# Patient Record
Sex: MALE | Race: WHITE | Employment: FULL TIME | URBAN - METROPOLITAN AREA
[De-identification: names, ages, dates, MRNs, and addresses within clinical notes are randomized per-mention and may not be internally consistent; named-entity substitution may affect disease eponyms.]

---

## 2021-01-01 ENCOUNTER — HOSPITAL ENCOUNTER (EMERGENCY)
Age: 66
Discharge: HOME | End: 2021-10-10
Payer: COMMERCIAL

## 2021-01-01 ENCOUNTER — APPOINTMENT (OUTPATIENT)
Dept: GENERAL RADIOLOGY | Age: 66
DRG: 207 | End: 2021-01-01
Payer: COMMERCIAL

## 2021-01-01 ENCOUNTER — OFFICE VISIT (OUTPATIENT)
Dept: FAMILY MEDICINE CLINIC | Age: 66
End: 2021-01-01
Payer: COMMERCIAL

## 2021-01-01 ENCOUNTER — APPOINTMENT (OUTPATIENT)
Dept: CT IMAGING | Age: 66
DRG: 207 | End: 2021-01-01
Payer: COMMERCIAL

## 2021-01-01 ENCOUNTER — CARE COORDINATION (OUTPATIENT)
Dept: CARE COORDINATION | Age: 66
End: 2021-01-01

## 2021-01-01 ENCOUNTER — APPOINTMENT (OUTPATIENT)
Dept: INTERVENTIONAL RADIOLOGY/VASCULAR | Age: 66
DRG: 207 | End: 2021-01-01
Payer: COMMERCIAL

## 2021-01-01 ENCOUNTER — HOSPITAL ENCOUNTER (INPATIENT)
Age: 66
LOS: 13 days | DRG: 207 | End: 2021-10-27
Attending: EMERGENCY MEDICINE
Payer: COMMERCIAL

## 2021-01-01 ENCOUNTER — APPOINTMENT (OUTPATIENT)
Dept: ULTRASOUND IMAGING | Age: 66
DRG: 207 | End: 2021-01-01
Payer: COMMERCIAL

## 2021-01-01 ENCOUNTER — APPOINTMENT (OUTPATIENT)
Dept: GENERAL RADIOLOGY | Age: 66
End: 2021-01-01
Payer: COMMERCIAL

## 2021-01-01 VITALS
BODY MASS INDEX: 33.5 KG/M2 | HEART RATE: 91 BPM | TEMPERATURE: 96.9 F | DIASTOLIC BLOOD PRESSURE: 80 MMHG | WEIGHT: 252.8 LBS | SYSTOLIC BLOOD PRESSURE: 126 MMHG | OXYGEN SATURATION: 94 % | HEIGHT: 73 IN

## 2021-01-01 VITALS
RESPIRATION RATE: 18 BRPM | TEMPERATURE: 99.3 F | HEIGHT: 75 IN | SYSTOLIC BLOOD PRESSURE: 145 MMHG | DIASTOLIC BLOOD PRESSURE: 77 MMHG | HEART RATE: 82 BPM | BODY MASS INDEX: 32.33 KG/M2 | OXYGEN SATURATION: 97 % | WEIGHT: 260 LBS

## 2021-01-01 VITALS
HEIGHT: 75 IN | OXYGEN SATURATION: 92 % | BODY MASS INDEX: 28.72 KG/M2 | WEIGHT: 231 LBS | TEMPERATURE: 97.7 F | SYSTOLIC BLOOD PRESSURE: 42 MMHG | DIASTOLIC BLOOD PRESSURE: 30 MMHG

## 2021-01-01 DIAGNOSIS — U07.1 COVID-19: Primary | ICD-10-CM

## 2021-01-01 DIAGNOSIS — J12.82 PNEUMONIA DUE TO COVID-19 VIRUS: Primary | ICD-10-CM

## 2021-01-01 DIAGNOSIS — M10.9 GOUT OF RIGHT FOOT, UNSPECIFIED CAUSE, UNSPECIFIED CHRONICITY: Primary | ICD-10-CM

## 2021-01-01 DIAGNOSIS — U07.1 PNEUMONIA DUE TO COVID-19 VIRUS: Primary | ICD-10-CM

## 2021-01-01 DIAGNOSIS — J18.9 MULTIFOCAL PNEUMONIA: ICD-10-CM

## 2021-01-01 DIAGNOSIS — J96.01 ACUTE RESPIRATORY FAILURE WITH HYPOXEMIA (HCC): ICD-10-CM

## 2021-01-01 LAB
ALBUMIN SERPL-MCNC: 2.9 GM/DL (ref 3.4–5)
ALBUMIN SERPL-MCNC: 2.9 GM/DL (ref 3.4–5)
ALBUMIN SERPL-MCNC: 3 GM/DL (ref 3.4–5)
ALBUMIN SERPL-MCNC: 3 GM/DL (ref 3.4–5)
ALBUMIN SERPL-MCNC: 3.1 GM/DL (ref 3.4–5)
ALBUMIN SERPL-MCNC: 3.2 GM/DL (ref 3.4–5)
ALBUMIN SERPL-MCNC: 3.4 GM/DL (ref 3.4–5)
ALBUMIN SERPL-MCNC: 3.7 GM/DL (ref 3.4–5)
ALBUMIN SERPL-MCNC: 3.8 GM/DL (ref 3.4–5)
ALBUMIN SERPL-MCNC: 3.9 GM/DL (ref 3.4–5)
ALBUMIN SERPL-MCNC: 4 GM/DL (ref 3.4–5)
ALBUMIN SERPL-MCNC: 4.2 GM/DL (ref 3.4–5)
ALP BLD-CCNC: 103 IU/L (ref 40–129)
ALP BLD-CCNC: 105 IU/L (ref 40–129)
ALP BLD-CCNC: 50 IU/L (ref 40–129)
ALP BLD-CCNC: 58 IU/L (ref 40–129)
ALP BLD-CCNC: 61 IU/L (ref 40–129)
ALP BLD-CCNC: 64 IU/L (ref 40–129)
ALP BLD-CCNC: 65 IU/L (ref 40–129)
ALP BLD-CCNC: 67 IU/L (ref 40–128)
ALP BLD-CCNC: 67 IU/L (ref 40–129)
ALP BLD-CCNC: 76 IU/L (ref 40–128)
ALP BLD-CCNC: 80 IU/L (ref 40–128)
ALP BLD-CCNC: 88 IU/L (ref 40–129)
ALP BLD-CCNC: 89 IU/L (ref 40–129)
ALP BLD-CCNC: 94 IU/L (ref 40–129)
ALP BLD-CCNC: 98 IU/L (ref 40–128)
ALP BLD-CCNC: 99 IU/L (ref 40–129)
ALT SERPL-CCNC: 102 U/L (ref 10–40)
ALT SERPL-CCNC: 119 U/L (ref 10–40)
ALT SERPL-CCNC: 131 U/L (ref 10–40)
ALT SERPL-CCNC: 132 U/L (ref 10–40)
ALT SERPL-CCNC: 143 U/L (ref 10–40)
ALT SERPL-CCNC: 342 U/L (ref 10–40)
ALT SERPL-CCNC: 53 U/L (ref 10–40)
ALT SERPL-CCNC: 59 U/L (ref 10–40)
ALT SERPL-CCNC: 59 U/L (ref 10–40)
ALT SERPL-CCNC: 61 U/L (ref 10–40)
ALT SERPL-CCNC: 62 U/L (ref 10–40)
ALT SERPL-CCNC: 63 U/L (ref 10–40)
ALT SERPL-CCNC: 67 U/L (ref 10–40)
ALT SERPL-CCNC: 76 U/L (ref 10–40)
ALT SERPL-CCNC: 85 U/L (ref 10–40)
ALT SERPL-CCNC: 95 U/L (ref 10–40)
AMMONIA: 10 UMOL/L (ref 16–60)
ANION GAP SERPL CALCULATED.3IONS-SCNC: 12 MMOL/L (ref 4–16)
ANION GAP SERPL CALCULATED.3IONS-SCNC: 13 MMOL/L (ref 4–16)
ANION GAP SERPL CALCULATED.3IONS-SCNC: 13 MMOL/L (ref 4–16)
ANION GAP SERPL CALCULATED.3IONS-SCNC: 14 MMOL/L (ref 4–16)
ANION GAP SERPL CALCULATED.3IONS-SCNC: 15 MMOL/L (ref 4–16)
ANION GAP SERPL CALCULATED.3IONS-SCNC: 16 MMOL/L (ref 4–16)
ANION GAP SERPL CALCULATED.3IONS-SCNC: 16 MMOL/L (ref 4–16)
ANION GAP SERPL CALCULATED.3IONS-SCNC: 17 MMOL/L (ref 4–16)
ANION GAP SERPL CALCULATED.3IONS-SCNC: 19 MMOL/L (ref 4–16)
ANION GAP SERPL CALCULATED.3IONS-SCNC: 21 MMOL/L (ref 4–16)
ANION GAP SERPL CALCULATED.3IONS-SCNC: 21 MMOL/L (ref 4–16)
ANION GAP SERPL CALCULATED.3IONS-SCNC: 22 MMOL/L (ref 4–16)
AST SERPL-CCNC: 335 IU/L (ref 15–37)
AST SERPL-CCNC: 40 IU/L (ref 15–37)
AST SERPL-CCNC: 43 IU/L (ref 15–37)
AST SERPL-CCNC: 44 IU/L (ref 15–37)
AST SERPL-CCNC: 45 IU/L (ref 15–37)
AST SERPL-CCNC: 51 IU/L (ref 15–37)
AST SERPL-CCNC: 52 IU/L (ref 15–37)
AST SERPL-CCNC: 54 IU/L (ref 15–37)
AST SERPL-CCNC: 55 IU/L (ref 15–37)
AST SERPL-CCNC: 56 IU/L (ref 15–37)
AST SERPL-CCNC: 60 IU/L (ref 15–37)
AST SERPL-CCNC: 61 IU/L (ref 15–37)
AST SERPL-CCNC: 72 IU/L (ref 15–37)
AST SERPL-CCNC: 72 IU/L (ref 15–37)
AST SERPL-CCNC: 79 IU/L (ref 15–37)
AST SERPL-CCNC: 85 IU/L (ref 15–37)
BACTERIA: ABNORMAL /HPF
BACTERIA: NEGATIVE /HPF
BANDED NEUTROPHILS ABSOLUTE COUNT: 1.48 K/CU MM
BANDED NEUTROPHILS RELATIVE PERCENT: 4 % (ref 5–11)
BASE EXCESS MIXED: 0 (ref 0–1.2)
BASE EXCESS: 0 (ref 0–3.3)
BASE EXCESS: 10 (ref 0–3.3)
BASE EXCESS: 2 (ref 0–3.3)
BASE EXCESS: 2 (ref 0–3.3)
BASE EXCESS: 4 (ref 0–3.3)
BASE EXCESS: 4 (ref 0–3.3)
BASE EXCESS: 6 (ref 0–3.3)
BASE EXCESS: 7 (ref 0–3.3)
BASE EXCESS: 8 (ref 0–3.3)
BASOPHILS ABSOLUTE: 0 K/CU MM
BASOPHILS ABSOLUTE: 0.1 K/CU MM
BASOPHILS ABSOLUTE: 0.1 K/CU MM
BASOPHILS RELATIVE PERCENT: 0.1 % (ref 0–1)
BASOPHILS RELATIVE PERCENT: 0.2 % (ref 0–1)
BASOPHILS RELATIVE PERCENT: 0.3 % (ref 0–1)
BILIRUB SERPL-MCNC: 0.8 MG/DL (ref 0–1)
BILIRUB SERPL-MCNC: 0.9 MG/DL (ref 0–1)
BILIRUB SERPL-MCNC: 0.9 MG/DL (ref 0–1)
BILIRUB SERPL-MCNC: 1 MG/DL (ref 0–1)
BILIRUB SERPL-MCNC: 1 MG/DL (ref 0–1)
BILIRUB SERPL-MCNC: 1.1 MG/DL (ref 0–1)
BILIRUB SERPL-MCNC: 1.2 MG/DL (ref 0–1)
BILIRUB SERPL-MCNC: 1.9 MG/DL (ref 0–1)
BILIRUB SERPL-MCNC: 3.7 MG/DL (ref 0–1)
BILIRUB SERPL-MCNC: 4 MG/DL (ref 0–1)
BILIRUBIN DIRECT: 0.3 MG/DL (ref 0–0.3)
BILIRUBIN DIRECT: 0.4 MG/DL (ref 0–0.3)
BILIRUBIN DIRECT: 0.4 MG/DL (ref 0–0.3)
BILIRUBIN DIRECT: 0.5 MG/DL (ref 0–0.3)
BILIRUBIN DIRECT: 0.5 MG/DL (ref 0–0.3)
BILIRUBIN DIRECT: 0.6 MG/DL (ref 0–0.3)
BILIRUBIN DIRECT: 1.6 MG/DL (ref 0–0.3)
BILIRUBIN DIRECT: 3.6 MG/DL (ref 0–0.3)
BILIRUBIN DIRECT: 3.6 MG/DL (ref 0–0.3)
BILIRUBIN URINE: NEGATIVE MG/DL
BILIRUBIN URINE: NEGATIVE MG/DL
BILIRUBIN, INDIRECT: 0.1 MG/DL (ref 0–0.7)
BILIRUBIN, INDIRECT: 0.3 MG/DL (ref 0–0.7)
BILIRUBIN, INDIRECT: 0.4 MG/DL (ref 0–0.7)
BILIRUBIN, INDIRECT: 0.5 MG/DL (ref 0–0.7)
BILIRUBIN, INDIRECT: 0.5 MG/DL (ref 0–0.7)
BILIRUBIN, INDIRECT: 0.6 MG/DL (ref 0–0.7)
BILIRUBIN, INDIRECT: 0.7 MG/DL (ref 0–0.7)
BLOOD, URINE: ABNORMAL
BLOOD, URINE: ABNORMAL
BUN BLDV-MCNC: 101 MG/DL (ref 6–23)
BUN BLDV-MCNC: 112 MG/DL (ref 6–23)
BUN BLDV-MCNC: 115 MG/DL (ref 6–23)
BUN BLDV-MCNC: 16 MG/DL (ref 6–23)
BUN BLDV-MCNC: 17 MG/DL (ref 6–23)
BUN BLDV-MCNC: 18 MG/DL (ref 6–23)
BUN BLDV-MCNC: 18 MG/DL (ref 6–23)
BUN BLDV-MCNC: 23 MG/DL (ref 6–23)
BUN BLDV-MCNC: 25 MG/DL (ref 6–23)
BUN BLDV-MCNC: 30 MG/DL (ref 6–23)
BUN BLDV-MCNC: 44 MG/DL (ref 6–23)
BUN BLDV-MCNC: 56 MG/DL (ref 6–23)
BUN BLDV-MCNC: 64 MG/DL (ref 6–23)
BUN BLDV-MCNC: 64 MG/DL (ref 6–23)
BUN BLDV-MCNC: 65 MG/DL (ref 6–23)
BUN BLDV-MCNC: 71 MG/DL (ref 6–23)
BUN BLDV-MCNC: 72 MG/DL (ref 6–23)
BUN BLDV-MCNC: 78 MG/DL (ref 6–23)
CALCIUM IONIZED: 3.24 MG/DL (ref 4.48–5.28)
CALCIUM IONIZED: 3.6 MG/DL (ref 4.48–5.28)
CALCIUM IONIZED: 3.88 MG/DL (ref 4.48–5.28)
CALCIUM IONIZED: 4 MG/DL (ref 4.48–5.28)
CALCIUM IONIZED: 4.04 MG/DL (ref 4.48–5.28)
CALCIUM IONIZED: 4.2 MG/DL (ref 4.48–5.28)
CALCIUM SERPL-MCNC: 6.1 MG/DL (ref 8.3–10.6)
CALCIUM SERPL-MCNC: 6.1 MG/DL (ref 8.3–10.6)
CALCIUM SERPL-MCNC: 6.3 MG/DL (ref 8.3–10.6)
CALCIUM SERPL-MCNC: 7.1 MG/DL (ref 8.3–10.6)
CALCIUM SERPL-MCNC: 7.2 MG/DL (ref 8.3–10.6)
CALCIUM SERPL-MCNC: 7.2 MG/DL (ref 8.3–10.6)
CALCIUM SERPL-MCNC: 7.3 MG/DL (ref 8.3–10.6)
CALCIUM SERPL-MCNC: 7.7 MG/DL (ref 8.3–10.6)
CALCIUM SERPL-MCNC: 8 MG/DL (ref 8.3–10.6)
CALCIUM SERPL-MCNC: 8 MG/DL (ref 8.3–10.6)
CALCIUM SERPL-MCNC: 8.6 MG/DL (ref 8.3–10.6)
CALCIUM SERPL-MCNC: 8.6 MG/DL (ref 8.3–10.6)
CALCIUM SERPL-MCNC: 8.8 MG/DL (ref 8.3–10.6)
CALCIUM SERPL-MCNC: 8.9 MG/DL (ref 8.3–10.6)
CALCIUM SERPL-MCNC: 8.9 MG/DL (ref 8.3–10.6)
CALCIUM SERPL-MCNC: 9 MG/DL (ref 8.3–10.6)
CALCIUM SERPL-MCNC: 9 MG/DL (ref 8.3–10.6)
CALCIUM SERPL-MCNC: 9.1 MG/DL (ref 8.3–10.6)
CARBON MONOXIDE, BLOOD: 1.6 % (ref 0–5)
CARBON MONOXIDE, BLOOD: 1.6 % (ref 0–5)
CARBON MONOXIDE, BLOOD: 1.7 % (ref 0–5)
CARBON MONOXIDE, BLOOD: 1.7 % (ref 0–5)
CARBON MONOXIDE, BLOOD: 1.8 % (ref 0–5)
CARBON MONOXIDE, BLOOD: 2 % (ref 0–5)
CARBON MONOXIDE, BLOOD: 2.1 % (ref 0–5)
CARBON MONOXIDE, BLOOD: 2.1 % (ref 0–5)
CARBON MONOXIDE, BLOOD: 2.3 % (ref 0–5)
CHLORIDE BLD-SCNC: 100 MMOL/L (ref 99–110)
CHLORIDE BLD-SCNC: 102 MMOL/L (ref 99–110)
CHLORIDE BLD-SCNC: 104 MMOL/L (ref 99–110)
CHLORIDE BLD-SCNC: 106 MMOL/L (ref 99–110)
CHLORIDE BLD-SCNC: 107 MMOL/L (ref 99–110)
CHLORIDE BLD-SCNC: 110 MMOL/L (ref 99–110)
CHLORIDE BLD-SCNC: 111 MMOL/L (ref 99–110)
CHLORIDE BLD-SCNC: 115 MMOL/L (ref 99–110)
CHLORIDE BLD-SCNC: 116 MMOL/L (ref 99–110)
CHLORIDE BLD-SCNC: 94 MMOL/L (ref 99–110)
CHLORIDE BLD-SCNC: 95 MMOL/L (ref 99–110)
CHLORIDE BLD-SCNC: 95 MMOL/L (ref 99–110)
CHLORIDE BLD-SCNC: 98 MMOL/L (ref 99–110)
CHLORIDE BLD-SCNC: 98 MMOL/L (ref 99–110)
CHLORIDE BLD-SCNC: 99 MMOL/L (ref 99–110)
CHLORIDE BLD-SCNC: 99 MMOL/L (ref 99–110)
CHLORIDE URINE RANDOM: 39 MMOL/L (ref 43–210)
CLARITY: ABNORMAL
CLARITY: CLEAR
CO2 CONTENT: 17 MMOL/L (ref 19–24)
CO2 CONTENT: 18.6 MMOL/L (ref 19–24)
CO2 CONTENT: 19.6 MMOL/L (ref 19–24)
CO2 CONTENT: 21.1 MMOL/L (ref 19–24)
CO2 CONTENT: 24.5 MMOL/L (ref 19–24)
CO2 CONTENT: 26.2 MMOL/L (ref 19–24)
CO2 CONTENT: 28 MMOL/L (ref 19–24)
CO2 CONTENT: 28.9 MMOL/L (ref 19–24)
CO2 CONTENT: 29.7 MMOL/L (ref 19–24)
CO2: 16 MMOL/L (ref 21–32)
CO2: 18 MMOL/L (ref 21–32)
CO2: 18 MMOL/L (ref 21–32)
CO2: 19 MMOL/L (ref 21–32)
CO2: 19 MMOL/L (ref 21–32)
CO2: 20 MMOL/L (ref 21–32)
CO2: 20 MMOL/L (ref 21–32)
CO2: 22 MMOL/L (ref 21–32)
CO2: 23 MMOL/L (ref 21–32)
CO2: 23 MMOL/L (ref 21–32)
CO2: 24 MMOL/L (ref 21–32)
CO2: 25 MMOL/L (ref 21–32)
CO2: 25 MMOL/L (ref 21–32)
CO2: 26 MMOL/L (ref 21–32)
CO2: 29 MMOL/L (ref 21–32)
COLOR: ABNORMAL
COLOR: YELLOW
COMMENT: ABNORMAL
COMMENT: NORMAL
CREAT SERPL-MCNC: 0.6 MG/DL (ref 0.9–1.3)
CREAT SERPL-MCNC: 0.6 MG/DL (ref 0.9–1.3)
CREAT SERPL-MCNC: 0.7 MG/DL (ref 0.9–1.3)
CREAT SERPL-MCNC: 0.8 MG/DL (ref 0.9–1.3)
CREAT SERPL-MCNC: 0.9 MG/DL (ref 0.9–1.3)
CREAT SERPL-MCNC: 2.9 MG/DL (ref 0.9–1.3)
CREAT SERPL-MCNC: 2.9 MG/DL (ref 0.9–1.3)
CREAT SERPL-MCNC: 3.2 MG/DL (ref 0.9–1.3)
CREAT SERPL-MCNC: 3.3 MG/DL (ref 0.9–1.3)
CREAT SERPL-MCNC: 3.4 MG/DL (ref 0.9–1.3)
CREAT SERPL-MCNC: 3.5 MG/DL (ref 0.9–1.3)
CREAT SERPL-MCNC: 4.1 MG/DL (ref 0.9–1.3)
CREAT SERPL-MCNC: 4.1 MG/DL (ref 0.9–1.3)
CREAT SERPL-MCNC: 5.2 MG/DL (ref 0.9–1.3)
CREAT SERPL-MCNC: 5.4 MG/DL (ref 0.9–1.3)
CREAT SERPL-MCNC: 5.7 MG/DL (ref 0.9–1.3)
CREATININE URINE: 102.4 MG/DL (ref 39–259)
CULTURE: NORMAL
D DIMER: 570 NG/ML(DDU)
DIFFERENTIAL TYPE: ABNORMAL
DOSE AMOUNT: NORMAL
DOSE TIME: NORMAL
EKG ATRIAL RATE: 108 BPM
EKG ATRIAL RATE: 89 BPM
EKG DIAGNOSIS: NORMAL
EKG DIAGNOSIS: NORMAL
EKG P AXIS: 28 DEGREES
EKG P AXIS: 67 DEGREES
EKG P-R INTERVAL: 162 MS
EKG P-R INTERVAL: 162 MS
EKG Q-T INTERVAL: 354 MS
EKG Q-T INTERVAL: 378 MS
EKG QRS DURATION: 94 MS
EKG QRS DURATION: 94 MS
EKG QTC CALCULATION (BAZETT): 459 MS
EKG QTC CALCULATION (BAZETT): 474 MS
EKG R AXIS: -19 DEGREES
EKG R AXIS: -22 DEGREES
EKG T AXIS: 52 DEGREES
EKG T AXIS: 94 DEGREES
EKG VENTRICULAR RATE: 108 BPM
EKG VENTRICULAR RATE: 89 BPM
EOSINOPHILS ABSOLUTE: 0 K/CU MM
EOSINOPHILS RELATIVE PERCENT: 0 % (ref 0–3)
EOSINOPHILS RELATIVE PERCENT: 0.1 % (ref 0–3)
EOSINOPHILS RELATIVE PERCENT: 0.1 % (ref 0–3)
ESTIMATED AVERAGE GLUCOSE: 157 MG/DL
FERRITIN: 1746 NG/ML (ref 30–400)
GFR AFRICAN AMERICAN: 12 ML/MIN/1.73M2
GFR AFRICAN AMERICAN: 13 ML/MIN/1.73M2
GFR AFRICAN AMERICAN: 13 ML/MIN/1.73M2
GFR AFRICAN AMERICAN: 18 ML/MIN/1.73M2
GFR AFRICAN AMERICAN: 18 ML/MIN/1.73M2
GFR AFRICAN AMERICAN: 21 ML/MIN/1.73M2
GFR AFRICAN AMERICAN: 22 ML/MIN/1.73M2
GFR AFRICAN AMERICAN: 23 ML/MIN/1.73M2
GFR AFRICAN AMERICAN: 24 ML/MIN/1.73M2
GFR AFRICAN AMERICAN: 26 ML/MIN/1.73M2
GFR AFRICAN AMERICAN: 26 ML/MIN/1.73M2
GFR AFRICAN AMERICAN: >60 ML/MIN/1.73M2
GFR NON-AFRICAN AMERICAN: 10 ML/MIN/1.73M2
GFR NON-AFRICAN AMERICAN: 11 ML/MIN/1.73M2
GFR NON-AFRICAN AMERICAN: 11 ML/MIN/1.73M2
GFR NON-AFRICAN AMERICAN: 15 ML/MIN/1.73M2
GFR NON-AFRICAN AMERICAN: 15 ML/MIN/1.73M2
GFR NON-AFRICAN AMERICAN: 18 ML/MIN/1.73M2
GFR NON-AFRICAN AMERICAN: 18 ML/MIN/1.73M2
GFR NON-AFRICAN AMERICAN: 19 ML/MIN/1.73M2
GFR NON-AFRICAN AMERICAN: 20 ML/MIN/1.73M2
GFR NON-AFRICAN AMERICAN: 22 ML/MIN/1.73M2
GFR NON-AFRICAN AMERICAN: 22 ML/MIN/1.73M2
GFR NON-AFRICAN AMERICAN: >60 ML/MIN/1.73M2
GLUCOSE BLD-MCNC: 124 MG/DL (ref 70–99)
GLUCOSE BLD-MCNC: 129 MG/DL (ref 70–99)
GLUCOSE BLD-MCNC: 141 MG/DL (ref 70–99)
GLUCOSE BLD-MCNC: 145 MG/DL (ref 70–99)
GLUCOSE BLD-MCNC: 148 MG/DL (ref 70–99)
GLUCOSE BLD-MCNC: 148 MG/DL (ref 70–99)
GLUCOSE BLD-MCNC: 151 MG/DL (ref 70–99)
GLUCOSE BLD-MCNC: 155 MG/DL (ref 70–99)
GLUCOSE BLD-MCNC: 161 MG/DL (ref 70–99)
GLUCOSE BLD-MCNC: 162 MG/DL (ref 70–99)
GLUCOSE BLD-MCNC: 164 MG/DL (ref 70–99)
GLUCOSE BLD-MCNC: 167 MG/DL (ref 70–99)
GLUCOSE BLD-MCNC: 172 MG/DL (ref 70–99)
GLUCOSE BLD-MCNC: 174 MG/DL (ref 70–99)
GLUCOSE BLD-MCNC: 174 MG/DL (ref 70–99)
GLUCOSE BLD-MCNC: 175 MG/DL (ref 70–99)
GLUCOSE BLD-MCNC: 176 MG/DL (ref 70–99)
GLUCOSE BLD-MCNC: 176 MG/DL (ref 70–99)
GLUCOSE BLD-MCNC: 178 MG/DL (ref 70–99)
GLUCOSE BLD-MCNC: 179 MG/DL (ref 70–99)
GLUCOSE BLD-MCNC: 182 MG/DL (ref 70–99)
GLUCOSE BLD-MCNC: 184 MG/DL (ref 70–99)
GLUCOSE BLD-MCNC: 185 MG/DL (ref 70–99)
GLUCOSE BLD-MCNC: 187 MG/DL (ref 70–99)
GLUCOSE BLD-MCNC: 189 MG/DL (ref 70–99)
GLUCOSE BLD-MCNC: 190 MG/DL (ref 70–99)
GLUCOSE BLD-MCNC: 191 MG/DL (ref 70–99)
GLUCOSE BLD-MCNC: 192 MG/DL (ref 70–99)
GLUCOSE BLD-MCNC: 198 MG/DL (ref 70–99)
GLUCOSE BLD-MCNC: 199 MG/DL (ref 70–99)
GLUCOSE BLD-MCNC: 203 MG/DL (ref 70–99)
GLUCOSE BLD-MCNC: 203 MG/DL (ref 70–99)
GLUCOSE BLD-MCNC: 204 MG/DL (ref 70–99)
GLUCOSE BLD-MCNC: 204 MG/DL (ref 70–99)
GLUCOSE BLD-MCNC: 206 MG/DL (ref 70–99)
GLUCOSE BLD-MCNC: 209 MG/DL (ref 70–99)
GLUCOSE BLD-MCNC: 210 MG/DL (ref 70–99)
GLUCOSE BLD-MCNC: 211 MG/DL (ref 70–99)
GLUCOSE BLD-MCNC: 213 MG/DL (ref 70–99)
GLUCOSE BLD-MCNC: 218 MG/DL (ref 70–99)
GLUCOSE BLD-MCNC: 224 MG/DL (ref 70–99)
GLUCOSE BLD-MCNC: 233 MG/DL (ref 70–99)
GLUCOSE BLD-MCNC: 237 MG/DL (ref 70–99)
GLUCOSE BLD-MCNC: 242 MG/DL (ref 70–99)
GLUCOSE BLD-MCNC: 248 MG/DL (ref 70–99)
GLUCOSE BLD-MCNC: 257 MG/DL (ref 70–99)
GLUCOSE BLD-MCNC: 263 MG/DL (ref 70–99)
GLUCOSE BLD-MCNC: 264 MG/DL (ref 70–99)
GLUCOSE BLD-MCNC: 264 MG/DL (ref 70–99)
GLUCOSE BLD-MCNC: 268 MG/DL (ref 70–99)
GLUCOSE BLD-MCNC: 281 MG/DL (ref 70–99)
GLUCOSE BLD-MCNC: 286 MG/DL (ref 70–99)
GLUCOSE BLD-MCNC: 289 MG/DL (ref 70–99)
GLUCOSE BLD-MCNC: 302 MG/DL (ref 70–99)
GLUCOSE BLD-MCNC: 327 MG/DL (ref 70–99)
GLUCOSE BLD-MCNC: 360 MG/DL (ref 70–99)
GLUCOSE BLD-MCNC: 364 MG/DL (ref 70–99)
GLUCOSE BLD-MCNC: 371 MG/DL (ref 70–99)
GLUCOSE BLD-MCNC: 371 MG/DL (ref 70–99)
GLUCOSE BLD-MCNC: 373 MG/DL (ref 70–99)
GLUCOSE BLD-MCNC: 376 MG/DL (ref 70–99)
GLUCOSE BLD-MCNC: 387 MG/DL (ref 70–99)
GLUCOSE BLD-MCNC: 388 MG/DL (ref 70–99)
GLUCOSE BLD-MCNC: 396 MG/DL (ref 70–99)
GLUCOSE BLD-MCNC: 443 MG/DL (ref 70–99)
GLUCOSE, URINE: 50 MG/DL
GLUCOSE, URINE: 50 MG/DL
GRAM SMEAR: NORMAL
HBA1C MFR BLD: 7.1 % (ref 4.2–6.3)
HBV SURFACE AB TITR SER: <3.5 {TITER}
HCO3 ARTERIAL: 16.3 MMOL/L (ref 18–23)
HCO3 ARTERIAL: 17.8 MMOL/L (ref 18–23)
HCO3 ARTERIAL: 18.8 MMOL/L (ref 18–23)
HCO3 ARTERIAL: 19.4 MMOL/L (ref 18–23)
HCO3 ARTERIAL: 23.2 MMOL/L (ref 18–23)
HCO3 ARTERIAL: 23.7 MMOL/L (ref 18–23)
HCO3 ARTERIAL: 25.2 MMOL/L (ref 18–23)
HCO3 ARTERIAL: 27.2 MMOL/L (ref 18–23)
HCO3 ARTERIAL: 27.6 MMOL/L (ref 18–23)
HCO3 VENOUS: 24.8 MMOL/L (ref 19–25)
HCT VFR BLD CALC: 32 % (ref 42–52)
HCT VFR BLD CALC: 33.3 % (ref 42–52)
HCT VFR BLD CALC: 34.6 % (ref 42–52)
HCT VFR BLD CALC: 44.7 % (ref 42–52)
HCT VFR BLD CALC: 48.1 % (ref 42–52)
HCT VFR BLD CALC: 49.3 % (ref 42–52)
HCT VFR BLD CALC: 49.8 % (ref 42–52)
HCT VFR BLD CALC: 49.9 % (ref 42–52)
HCT VFR BLD CALC: 50.9 % (ref 42–52)
HCT VFR BLD CALC: 54 % (ref 42–52)
HCT VFR BLD CALC: 56.2 % (ref 42–52)
HCT VFR BLD CALC: 56.4 % (ref 42–52)
HCT VFR BLD CALC: 62.6 % (ref 42–52)
HEMOGLOBIN: 10.6 GM/DL (ref 13.5–18)
HEMOGLOBIN: 10.8 GM/DL (ref 13.5–18)
HEMOGLOBIN: 11.4 GM/DL (ref 13.5–18)
HEMOGLOBIN: 13.9 GM/DL (ref 13.5–18)
HEMOGLOBIN: 16.7 GM/DL (ref 13.5–18)
HEMOGLOBIN: 16.7 GM/DL (ref 13.5–18)
HEMOGLOBIN: 16.9 GM/DL (ref 13.5–18)
HEMOGLOBIN: 17 GM/DL (ref 13.5–18)
HEMOGLOBIN: 17.6 GM/DL (ref 13.5–18)
HEMOGLOBIN: 17.6 GM/DL (ref 13.5–18)
HEMOGLOBIN: 18 GM/DL (ref 13.5–18)
HEMOGLOBIN: 19.1 GM/DL (ref 13.5–18)
HEMOGLOBIN: 19.5 GM/DL (ref 13.5–18)
HEPATITIS B SURFACE ANTIGEN: NON REACTIVE
HIGH SENSITIVE C-REACTIVE PROTEIN: 282.7 MG/L
HIGH SENSITIVE C-REACTIVE PROTEIN: 37.7 MG/L
HIGH SENSITIVE C-REACTIVE PROTEIN: 43.1 MG/L
IMMATURE NEUTROPHIL %: 0.7 % (ref 0–0.43)
IMMATURE NEUTROPHIL %: 1 % (ref 0–0.43)
IMMATURE NEUTROPHIL %: 1.4 % (ref 0–0.43)
IMMATURE NEUTROPHIL %: 1.7 % (ref 0–0.43)
IMMATURE NEUTROPHIL %: 1.8 % (ref 0–0.43)
IMMATURE NEUTROPHIL %: 2 % (ref 0–0.43)
IMMATURE NEUTROPHIL %: 2.6 % (ref 0–0.43)
IONIZED CA: 0.81 MMOL/L (ref 1.12–1.32)
IONIZED CA: 0.9 MMOL/L (ref 1.12–1.32)
IONIZED CA: 0.97 MMOL/L (ref 1.12–1.32)
IONIZED CA: 1 MMOL/L (ref 1.12–1.32)
IONIZED CA: 1.01 MMOL/L (ref 1.12–1.32)
IONIZED CA: 1.05 MMOL/L (ref 1.12–1.32)
KETONES, URINE: ABNORMAL MG/DL
KETONES, URINE: NEGATIVE MG/DL
LACTATE DEHYDROGENASE: 435 IU/L (ref 120–246)
LACTATE: 1.6 MMOL/L (ref 0.4–2)
LACTATE: 3.1 MMOL/L (ref 0.4–2)
LEGIONELLA URINARY AG: NEGATIVE
LEUKOCYTE ESTERASE, URINE: ABNORMAL
LEUKOCYTE ESTERASE, URINE: NEGATIVE
LYMPHOCYTES ABSOLUTE: 0.4 K/CU MM
LYMPHOCYTES ABSOLUTE: 0.4 K/CU MM
LYMPHOCYTES ABSOLUTE: 0.5 K/CU MM
LYMPHOCYTES ABSOLUTE: 0.5 K/CU MM
LYMPHOCYTES ABSOLUTE: 0.6 K/CU MM
LYMPHOCYTES ABSOLUTE: 0.7 K/CU MM
LYMPHOCYTES ABSOLUTE: 0.7 K/CU MM
LYMPHOCYTES ABSOLUTE: 0.8 K/CU MM
LYMPHOCYTES ABSOLUTE: 0.9 K/CU MM
LYMPHOCYTES ABSOLUTE: 0.9 K/CU MM
LYMPHOCYTES ABSOLUTE: 2.9 K/CU MM
LYMPHOCYTES RELATIVE PERCENT: 1 % (ref 24–44)
LYMPHOCYTES RELATIVE PERCENT: 2 % (ref 24–44)
LYMPHOCYTES RELATIVE PERCENT: 3.4 % (ref 24–44)
LYMPHOCYTES RELATIVE PERCENT: 3.4 % (ref 24–44)
LYMPHOCYTES RELATIVE PERCENT: 3.5 % (ref 24–44)
LYMPHOCYTES RELATIVE PERCENT: 4 % (ref 24–44)
LYMPHOCYTES RELATIVE PERCENT: 4.3 % (ref 24–44)
LYMPHOCYTES RELATIVE PERCENT: 6.6 % (ref 24–44)
LYMPHOCYTES RELATIVE PERCENT: 7.9 % (ref 24–44)
LYMPHOCYTES RELATIVE PERCENT: 8.5 % (ref 24–44)
LYMPHOCYTES RELATIVE PERCENT: 9 % (ref 24–44)
Lab: NORMAL
MAGNESIUM: 2.5 MG/DL (ref 1.8–2.4)
MAGNESIUM: 2.5 MG/DL (ref 1.8–2.4)
MAGNESIUM: 2.6 MG/DL (ref 1.8–2.4)
MAGNESIUM: 2.6 MG/DL (ref 1.8–2.4)
MAGNESIUM: 2.7 MG/DL (ref 1.8–2.4)
MCH RBC QN AUTO: 30.9 PG (ref 27–31)
MCH RBC QN AUTO: 31 PG (ref 27–31)
MCH RBC QN AUTO: 31.1 PG (ref 27–31)
MCH RBC QN AUTO: 31.2 PG (ref 27–31)
MCH RBC QN AUTO: 31.3 PG (ref 27–31)
MCH RBC QN AUTO: 31.4 PG (ref 27–31)
MCH RBC QN AUTO: 31.6 PG (ref 27–31)
MCH RBC QN AUTO: 31.8 PG (ref 27–31)
MCHC RBC AUTO-ENTMCNC: 31.1 % (ref 32–36)
MCHC RBC AUTO-ENTMCNC: 31.2 % (ref 32–36)
MCHC RBC AUTO-ENTMCNC: 31.2 % (ref 32–36)
MCHC RBC AUTO-ENTMCNC: 32.4 % (ref 32–36)
MCHC RBC AUTO-ENTMCNC: 33.1 % (ref 32–36)
MCHC RBC AUTO-ENTMCNC: 33.3 % (ref 32–36)
MCHC RBC AUTO-ENTMCNC: 33.9 % (ref 32–36)
MCHC RBC AUTO-ENTMCNC: 33.9 % (ref 32–36)
MCHC RBC AUTO-ENTMCNC: 34 % (ref 32–36)
MCHC RBC AUTO-ENTMCNC: 34.1 % (ref 32–36)
MCHC RBC AUTO-ENTMCNC: 34.6 % (ref 32–36)
MCHC RBC AUTO-ENTMCNC: 34.7 % (ref 32–36)
MCV RBC AUTO: 100 FL (ref 78–100)
MCV RBC AUTO: 100 FL (ref 78–100)
MCV RBC AUTO: 100.4 FL (ref 78–100)
MCV RBC AUTO: 90.7 FL (ref 78–100)
MCV RBC AUTO: 90.9 FL (ref 78–100)
MCV RBC AUTO: 91.7 FL (ref 78–100)
MCV RBC AUTO: 92.1 FL (ref 78–100)
MCV RBC AUTO: 92.3 FL (ref 78–100)
MCV RBC AUTO: 92.3 FL (ref 78–100)
MCV RBC AUTO: 92.6 FL (ref 78–100)
MCV RBC AUTO: 95.7 FL (ref 78–100)
MCV RBC AUTO: 96.1 FL (ref 78–100)
METAMYELOCYTES ABSOLUTE COUNT: 0.37 K/CU MM
METAMYELOCYTES PERCENT: 1 %
METHEMOGLOBIN ARTERIAL: 1.1 %
METHEMOGLOBIN ARTERIAL: 1.1 %
METHEMOGLOBIN ARTERIAL: 1.2 %
METHEMOGLOBIN ARTERIAL: 1.2 %
METHEMOGLOBIN ARTERIAL: 1.3 %
METHEMOGLOBIN ARTERIAL: 1.4 %
METHEMOGLOBIN ARTERIAL: 1.5 %
METHEMOGLOBIN ARTERIAL: 1.5 %
METHEMOGLOBIN ARTERIAL: 1.6 %
MONOCYTES ABSOLUTE: 0.4 K/CU MM
MONOCYTES ABSOLUTE: 0.6 K/CU MM
MONOCYTES ABSOLUTE: 0.6 K/CU MM
MONOCYTES ABSOLUTE: 0.7 K/CU MM
MONOCYTES ABSOLUTE: 0.8 K/CU MM
MONOCYTES ABSOLUTE: 0.9 K/CU MM
MONOCYTES ABSOLUTE: 1.1 K/CU MM
MONOCYTES ABSOLUTE: 1.1 K/CU MM
MONOCYTES ABSOLUTE: 1.2 K/CU MM
MONOCYTES ABSOLUTE: 1.3 K/CU MM
MONOCYTES ABSOLUTE: 1.3 K/CU MM
MONOCYTES RELATIVE PERCENT: 3 % (ref 0–4)
MONOCYTES RELATIVE PERCENT: 4 % (ref 0–4)
MONOCYTES RELATIVE PERCENT: 4.4 % (ref 0–4)
MONOCYTES RELATIVE PERCENT: 4.5 % (ref 0–4)
MONOCYTES RELATIVE PERCENT: 5.1 % (ref 0–4)
MONOCYTES RELATIVE PERCENT: 5.4 % (ref 0–4)
MONOCYTES RELATIVE PERCENT: 5.4 % (ref 0–4)
MONOCYTES RELATIVE PERCENT: 5.6 % (ref 0–4)
MONOCYTES RELATIVE PERCENT: 5.8 % (ref 0–4)
MONOCYTES RELATIVE PERCENT: 6.2 % (ref 0–4)
MONOCYTES RELATIVE PERCENT: 6.3 % (ref 0–4)
MUCUS: ABNORMAL HPF
MUCUS: ABNORMAL HPF
NITRITE URINE, QUANTITATIVE: NEGATIVE
NITRITE URINE, QUANTITATIVE: NEGATIVE
NUCLEATED RBC %: 0 %
NUCLEATED RBC %: 0.1 %
O2 SAT, VEN: 57.5 % (ref 50–70)
O2 SATURATION: 87 % (ref 96–97)
O2 SATURATION: 88.2 % (ref 96–97)
O2 SATURATION: 88.7 % (ref 96–97)
O2 SATURATION: 88.7 % (ref 96–97)
O2 SATURATION: 90.5 % (ref 96–97)
O2 SATURATION: 93 % (ref 96–97)
O2 SATURATION: 93.1 % (ref 96–97)
O2 SATURATION: 94.6 % (ref 96–97)
O2 SATURATION: 97.1 % (ref 96–97)
PCO2 ARTERIAL: 24 MMHG (ref 32–45)
PCO2 ARTERIAL: 25 MMHG (ref 32–45)
PCO2 ARTERIAL: 27 MMHG (ref 32–45)
PCO2 ARTERIAL: 42 MMHG (ref 32–45)
PCO2 ARTERIAL: 54 MMHG (ref 32–45)
PCO2 ARTERIAL: 57 MMHG (ref 32–45)
PCO2 ARTERIAL: 69 MMHG (ref 32–45)
PCO2 ARTERIAL: 80 MMHG (ref 32–45)
PCO2 ARTERIAL: 91 MMHG (ref 32–45)
PCO2, VEN: 41 MMHG (ref 38–52)
PDW BLD-RTO: 11.9 % (ref 11.7–14.9)
PDW BLD-RTO: 12.1 % (ref 11.7–14.9)
PDW BLD-RTO: 12.1 % (ref 11.7–14.9)
PDW BLD-RTO: 12.3 % (ref 11.7–14.9)
PH BLOOD: 7.05 (ref 7.34–7.45)
PH BLOOD: 7.08 (ref 7.34–7.45)
PH BLOOD: 7.14 (ref 7.34–7.45)
PH BLOOD: 7.21 (ref 7.34–7.45)
PH BLOOD: 7.31 (ref 7.34–7.45)
PH BLOOD: 7.35 (ref 7.34–7.45)
PH BLOOD: 7.44 (ref 7.34–7.45)
PH BLOOD: 7.45 (ref 7.34–7.45)
PH BLOOD: 7.46 (ref 7.34–7.45)
PH VENOUS: 7.39 (ref 7.32–7.42)
PH, URINE: 5 (ref 5–8)
PH, URINE: 6 (ref 5–8)
PHOSPHORUS: 11.2 MG/DL (ref 2.5–4.9)
PHOSPHORUS: 13.3 MG/DL (ref 2.5–4.9)
PHOSPHORUS: 3 MG/DL (ref 2.5–4.9)
PHOSPHORUS: 3 MG/DL (ref 2.5–4.9)
PHOSPHORUS: 3.5 MG/DL (ref 2.5–4.9)
PHOSPHORUS: 3.7 MG/DL (ref 2.5–4.9)
PHOSPHORUS: 3.8 MG/DL (ref 2.5–4.9)
PHOSPHORUS: 4.9 MG/DL (ref 2.5–4.9)
PHOSPHORUS: 5.2 MG/DL (ref 2.5–4.9)
PHOSPHORUS: 6.4 MG/DL (ref 2.5–4.9)
PHOSPHORUS: 6.5 MG/DL (ref 2.5–4.9)
PHOSPHORUS: 8.1 MG/DL (ref 2.5–4.9)
PHOSPHORUS: 8.2 MG/DL (ref 2.5–4.9)
PHOSPHORUS: 9.3 MG/DL (ref 2.5–4.9)
PLATELET # BLD: 106 K/CU MM (ref 140–440)
PLATELET # BLD: 147 K/CU MM (ref 140–440)
PLATELET # BLD: 147 K/CU MM (ref 140–440)
PLATELET # BLD: 184 K/CU MM (ref 140–440)
PLATELET # BLD: 206 K/CU MM (ref 140–440)
PLATELET # BLD: 243 K/CU MM (ref 140–440)
PLATELET # BLD: 250 K/CU MM (ref 140–440)
PLATELET # BLD: 251 K/CU MM (ref 140–440)
PLATELET # BLD: 306 K/CU MM (ref 140–440)
PLATELET # BLD: 323 K/CU MM (ref 140–440)
PLATELET # BLD: 346 K/CU MM (ref 140–440)
PLATELET # BLD: 353 K/CU MM (ref 140–440)
PLT MORPHOLOGY: ABNORMAL
PMV BLD AUTO: 10.1 FL (ref 7.5–11.1)
PMV BLD AUTO: 10.2 FL (ref 7.5–11.1)
PMV BLD AUTO: 10.5 FL (ref 7.5–11.1)
PMV BLD AUTO: 10.8 FL (ref 7.5–11.1)
PMV BLD AUTO: 11.1 FL (ref 7.5–11.1)
PMV BLD AUTO: 11.3 FL (ref 7.5–11.1)
PMV BLD AUTO: 11.5 FL (ref 7.5–11.1)
PMV BLD AUTO: 11.9 FL (ref 7.5–11.1)
PMV BLD AUTO: 9.6 FL (ref 7.5–11.1)
PMV BLD AUTO: 9.7 FL (ref 7.5–11.1)
PMV BLD AUTO: 9.7 FL (ref 7.5–11.1)
PMV BLD AUTO: 9.9 FL (ref 7.5–11.1)
PO2 ARTERIAL: 169 MMHG (ref 75–100)
PO2 ARTERIAL: 53 MMHG (ref 75–100)
PO2 ARTERIAL: 64 MMHG (ref 75–100)
PO2 ARTERIAL: 68 MMHG (ref 75–100)
PO2 ARTERIAL: 71 MMHG (ref 75–100)
PO2 ARTERIAL: 76 MMHG (ref 75–100)
PO2 ARTERIAL: 83 MMHG (ref 75–100)
PO2, VEN: 32 MMHG (ref 28–48)
POTASSIUM SERPL-SCNC: 3.4 MMOL/L (ref 3.5–5.1)
POTASSIUM SERPL-SCNC: 3.5 MMOL/L (ref 3.5–5.1)
POTASSIUM SERPL-SCNC: 3.6 MMOL/L (ref 3.5–5.1)
POTASSIUM SERPL-SCNC: 3.7 MMOL/L (ref 3.5–5.1)
POTASSIUM SERPL-SCNC: 3.7 MMOL/L (ref 3.5–5.1)
POTASSIUM SERPL-SCNC: 4.1 MMOL/L (ref 3.5–5.1)
POTASSIUM SERPL-SCNC: 4.6 MMOL/L (ref 3.5–5.1)
POTASSIUM SERPL-SCNC: 4.6 MMOL/L (ref 3.5–5.1)
POTASSIUM SERPL-SCNC: 4.7 MMOL/L (ref 3.5–5.1)
POTASSIUM SERPL-SCNC: 4.8 MMOL/L (ref 3.5–5.1)
POTASSIUM SERPL-SCNC: 4.9 MMOL/L (ref 3.5–5.1)
POTASSIUM SERPL-SCNC: 5 MMOL/L (ref 3.5–5.1)
POTASSIUM SERPL-SCNC: 5.2 MMOL/L (ref 3.5–5.1)
POTASSIUM SERPL-SCNC: 5.6 MMOL/L (ref 3.5–5.1)
POTASSIUM SERPL-SCNC: 5.7 MMOL/L (ref 3.5–5.1)
POTASSIUM SERPL-SCNC: 6.1 MMOL/L (ref 3.5–5.1)
POTASSIUM SERPL-SCNC: 6.6 MMOL/L (ref 3.5–5.1)
POTASSIUM SERPL-SCNC: 7 MMOL/L (ref 3.5–5.1)
POTASSIUM, UR: 54 MMOL/L (ref 22–119)
PRO-BNP: 58.25 PG/ML
PROCALCITONIN: 0.1
PROCALCITONIN: 0.1
PROCALCITONIN: 0.16
PROCALCITONIN: 0.19
PROCALCITONIN: 0.2
PROT/CREAT RATIO, UR: 1.9
PROTEIN UA: 100 MG/DL
PROTEIN UA: 30 MG/DL
RBC # BLD: 3.33 M/CU MM (ref 4.6–6.2)
RBC # BLD: 3.48 M/CU MM (ref 4.6–6.2)
RBC # BLD: 4.45 M/CU MM (ref 4.6–6.2)
RBC # BLD: 5.29 M/CU MM (ref 4.6–6.2)
RBC # BLD: 5.34 M/CU MM (ref 4.6–6.2)
RBC # BLD: 5.42 M/CU MM (ref 4.6–6.2)
RBC # BLD: 5.43 M/CU MM (ref 4.6–6.2)
RBC # BLD: 5.61 M/CU MM (ref 4.6–6.2)
RBC # BLD: 5.64 M/CU MM (ref 4.6–6.2)
RBC # BLD: 5.83 M/CU MM (ref 4.6–6.2)
RBC # BLD: 6.09 M/CU MM (ref 4.6–6.2)
RBC # BLD: 6.26 M/CU MM (ref 4.6–6.2)
RBC URINE: 251 /HPF (ref 0–3)
RBC URINE: 427 /HPF (ref 0–3)
SARS-COV-2: DETECTED
SEGMENTED NEUTROPHILS ABSOLUTE COUNT: 10.4 K/CU MM
SEGMENTED NEUTROPHILS ABSOLUTE COUNT: 13.8 K/CU MM
SEGMENTED NEUTROPHILS ABSOLUTE COUNT: 16.3 K/CU MM
SEGMENTED NEUTROPHILS ABSOLUTE COUNT: 18.6 K/CU MM
SEGMENTED NEUTROPHILS ABSOLUTE COUNT: 18.7 K/CU MM
SEGMENTED NEUTROPHILS ABSOLUTE COUNT: 22.2 K/CU MM
SEGMENTED NEUTROPHILS ABSOLUTE COUNT: 27.8 K/CU MM
SEGMENTED NEUTROPHILS ABSOLUTE COUNT: 33.6 K/CU MM
SEGMENTED NEUTROPHILS ABSOLUTE COUNT: 8.4 K/CU MM
SEGMENTED NEUTROPHILS ABSOLUTE COUNT: 8.7 K/CU MM
SEGMENTED NEUTROPHILS ABSOLUTE COUNT: 9.1 K/CU MM
SEGMENTED NEUTROPHILS RELATIVE PERCENT: 83.2 % (ref 36–66)
SEGMENTED NEUTROPHILS RELATIVE PERCENT: 84.7 % (ref 36–66)
SEGMENTED NEUTROPHILS RELATIVE PERCENT: 87 % (ref 36–66)
SEGMENTED NEUTROPHILS RELATIVE PERCENT: 87.4 % (ref 36–66)
SEGMENTED NEUTROPHILS RELATIVE PERCENT: 87.9 % (ref 36–66)
SEGMENTED NEUTROPHILS RELATIVE PERCENT: 88.6 % (ref 36–66)
SEGMENTED NEUTROPHILS RELATIVE PERCENT: 89.1 % (ref 36–66)
SEGMENTED NEUTROPHILS RELATIVE PERCENT: 89.3 % (ref 36–66)
SEGMENTED NEUTROPHILS RELATIVE PERCENT: 89.4 % (ref 36–66)
SEGMENTED NEUTROPHILS RELATIVE PERCENT: 91 % (ref 36–66)
SEGMENTED NEUTROPHILS RELATIVE PERCENT: 91.9 % (ref 36–66)
SODIUM BLD-SCNC: 133 MMOL/L (ref 135–145)
SODIUM BLD-SCNC: 134 MMOL/L (ref 135–145)
SODIUM BLD-SCNC: 134 MMOL/L (ref 135–145)
SODIUM BLD-SCNC: 137 MMOL/L (ref 135–145)
SODIUM BLD-SCNC: 139 MMOL/L (ref 135–145)
SODIUM BLD-SCNC: 141 MMOL/L (ref 135–145)
SODIUM BLD-SCNC: 141 MMOL/L (ref 135–145)
SODIUM BLD-SCNC: 144 MMOL/L (ref 135–145)
SODIUM BLD-SCNC: 144 MMOL/L (ref 135–145)
SODIUM BLD-SCNC: 145 MMOL/L (ref 135–145)
SODIUM BLD-SCNC: 146 MMOL/L (ref 135–145)
SODIUM BLD-SCNC: 147 MMOL/L (ref 135–145)
SODIUM BLD-SCNC: 148 MMOL/L (ref 135–145)
SODIUM BLD-SCNC: 148 MMOL/L (ref 135–145)
SODIUM BLD-SCNC: 153 MMOL/L (ref 135–145)
SODIUM URINE: 67 MMOL/L (ref 35–167)
SPECIFIC GRAVITY UA: 1.02 (ref 1–1.03)
SPECIFIC GRAVITY UA: 1.03 (ref 1–1.03)
SPECIMEN: NORMAL
SQUAMOUS EPITHELIAL: <1 /HPF
STREP PNEUMONIAE ANTIGEN: NORMAL
TOTAL IMMATURE NEUTOROPHIL: 0.07 K/CU MM
TOTAL IMMATURE NEUTOROPHIL: 0.1 K/CU MM
TOTAL IMMATURE NEUTOROPHIL: 0.17 K/CU MM
TOTAL IMMATURE NEUTOROPHIL: 0.18 K/CU MM
TOTAL IMMATURE NEUTOROPHIL: 0.21 K/CU MM
TOTAL IMMATURE NEUTOROPHIL: 0.29 K/CU MM
TOTAL IMMATURE NEUTOROPHIL: 0.38 K/CU MM
TOTAL IMMATURE NEUTOROPHIL: 0.48 K/CU MM
TOTAL IMMATURE NEUTOROPHIL: 0.5 K/CU MM
TOTAL NUCLEATED RBC: 0 K/CU MM
TOTAL PROTEIN: 4.5 GM/DL (ref 6.4–8.2)
TOTAL PROTEIN: 4.9 GM/DL (ref 6.4–8.2)
TOTAL PROTEIN: 5 GM/DL (ref 6.4–8.2)
TOTAL PROTEIN: 5.1 GM/DL (ref 6.4–8.2)
TOTAL PROTEIN: 5.3 GM/DL (ref 6.4–8.2)
TOTAL PROTEIN: 5.4 GM/DL (ref 6.4–8.2)
TOTAL PROTEIN: 5.4 GM/DL (ref 6.4–8.2)
TOTAL PROTEIN: 6.2 GM/DL (ref 6.4–8.2)
TOTAL PROTEIN: 6.5 GM/DL (ref 6.4–8.2)
TOTAL PROTEIN: 6.7 GM/DL (ref 6.4–8.2)
TOTAL PROTEIN: 6.8 GM/DL (ref 6.4–8.2)
TOTAL PROTEIN: 6.9 GM/DL (ref 6.4–8.2)
TOTAL PROTEIN: 6.9 GM/DL (ref 6.4–8.2)
TOTAL PROTEIN: 7 GM/DL (ref 6.4–8.2)
TOXIC GRANULATION: PRESENT
TRICHOMONAS: ABNORMAL /HPF
TRICHOMONAS: ABNORMAL /HPF
TROPONIN T: <0.01 NG/ML
URINE TOTAL PROTEIN: 198 MG/DL
UROBILINOGEN, URINE: NEGATIVE MG/DL (ref 0.2–1)
UROBILINOGEN, URINE: NEGATIVE MG/DL (ref 0.2–1)
VANCOMYCIN RANDOM: 13.5 UG/ML
VANCOMYCIN RANDOM: 18.3 UG/ML
VANCOMYCIN RANDOM: 22.1 UG/ML
WBC # BLD: 10.3 K/CU MM (ref 4–10.5)
WBC # BLD: 10.9 K/CU MM (ref 4–10.5)
WBC # BLD: 11.8 K/CU MM (ref 4–10.5)
WBC # BLD: 13.7 K/CU MM (ref 4–10.5)
WBC # BLD: 15.4 K/CU MM (ref 4–10.5)
WBC # BLD: 18.6 K/CU MM (ref 4–10.5)
WBC # BLD: 20.4 K/CU MM (ref 4–10.5)
WBC # BLD: 20.9 K/CU MM (ref 4–10.5)
WBC # BLD: 24.9 K/CU MM (ref 4–10.5)
WBC # BLD: 32 K/CU MM (ref 4–10.5)
WBC # BLD: 36.9 K/CU MM (ref 4–10.5)
WBC # BLD: 9.5 K/CU MM (ref 4–10.5)
WBC UA: 6 /HPF (ref 0–2)
WBC UA: 6 /HPF (ref 0–2)

## 2021-01-01 PROCEDURE — 6360000002 HC RX W HCPCS: Performed by: INTERNAL MEDICINE

## 2021-01-01 PROCEDURE — 82962 GLUCOSE BLOOD TEST: CPT

## 2021-01-01 PROCEDURE — 94640 AIRWAY INHALATION TREATMENT: CPT

## 2021-01-01 PROCEDURE — 6360000002 HC RX W HCPCS: Performed by: NURSE PRACTITIONER

## 2021-01-01 PROCEDURE — 85014 HEMATOCRIT: CPT

## 2021-01-01 PROCEDURE — 93010 ELECTROCARDIOGRAM REPORT: CPT | Performed by: INTERNAL MEDICINE

## 2021-01-01 PROCEDURE — 6370000000 HC RX 637 (ALT 250 FOR IP): Performed by: NURSE PRACTITIONER

## 2021-01-01 PROCEDURE — 85025 COMPLETE CBC W/AUTO DIFF WBC: CPT

## 2021-01-01 PROCEDURE — 71045 X-RAY EXAM CHEST 1 VIEW: CPT

## 2021-01-01 PROCEDURE — 94761 N-INVAS EAR/PLS OXIMETRY MLT: CPT

## 2021-01-01 PROCEDURE — 36592 COLLECT BLOOD FROM PICC: CPT

## 2021-01-01 PROCEDURE — 70450 CT HEAD/BRAIN W/O DYE: CPT

## 2021-01-01 PROCEDURE — 2500000003 HC RX 250 WO HCPCS

## 2021-01-01 PROCEDURE — 2580000003 HC RX 258: Performed by: NURSE PRACTITIONER

## 2021-01-01 PROCEDURE — 2060000000 HC ICU INTERMEDIATE R&B

## 2021-01-01 PROCEDURE — 0W9B30Z DRAINAGE OF LEFT PLEURAL CAVITY WITH DRAINAGE DEVICE, PERCUTANEOUS APPROACH: ICD-10-PCS | Performed by: RADIOLOGY

## 2021-01-01 PROCEDURE — 80048 BASIC METABOLIC PNL TOTAL CA: CPT

## 2021-01-01 PROCEDURE — 93005 ELECTROCARDIOGRAM TRACING: CPT | Performed by: PHYSICIAN ASSISTANT

## 2021-01-01 PROCEDURE — 85379 FIBRIN DEGRADATION QUANT: CPT

## 2021-01-01 PROCEDURE — C1729 CATH, DRAINAGE: HCPCS

## 2021-01-01 PROCEDURE — 6370000000 HC RX 637 (ALT 250 FOR IP): Performed by: INTERNAL MEDICINE

## 2021-01-01 PROCEDURE — 36415 COLL VENOUS BLD VENIPUNCTURE: CPT

## 2021-01-01 PROCEDURE — 36600 WITHDRAWAL OF ARTERIAL BLOOD: CPT

## 2021-01-01 PROCEDURE — 6360000002 HC RX W HCPCS: Performed by: PHYSICIAN ASSISTANT

## 2021-01-01 PROCEDURE — 87449 NOS EACH ORGANISM AG IA: CPT

## 2021-01-01 PROCEDURE — 2500000003 HC RX 250 WO HCPCS: Performed by: NURSE PRACTITIONER

## 2021-01-01 PROCEDURE — 0BH17EZ INSERTION OF ENDOTRACHEAL AIRWAY INTO TRACHEA, VIA NATURAL OR ARTIFICIAL OPENING: ICD-10-PCS | Performed by: NURSE PRACTITIONER

## 2021-01-01 PROCEDURE — 2580000003 HC RX 258: Performed by: INTERNAL MEDICINE

## 2021-01-01 PROCEDURE — 82803 BLOOD GASES ANY COMBINATION: CPT

## 2021-01-01 PROCEDURE — P9045 ALBUMIN (HUMAN), 5%, 250 ML: HCPCS

## 2021-01-01 PROCEDURE — 82248 BILIRUBIN DIRECT: CPT

## 2021-01-01 PROCEDURE — 99255 IP/OBS CONSLTJ NEW/EST HI 80: CPT | Performed by: STUDENT IN AN ORGANIZED HEALTH CARE EDUCATION/TRAINING PROGRAM

## 2021-01-01 PROCEDURE — P9047 ALBUMIN (HUMAN), 25%, 50ML: HCPCS | Performed by: INTERNAL MEDICINE

## 2021-01-01 PROCEDURE — 94003 VENT MGMT INPAT SUBQ DAY: CPT

## 2021-01-01 PROCEDURE — C9113 INJ PANTOPRAZOLE SODIUM, VIA: HCPCS | Performed by: NURSE PRACTITIONER

## 2021-01-01 PROCEDURE — 02HV33Z INSERTION OF INFUSION DEVICE INTO SUPERIOR VENA CAVA, PERCUTANEOUS APPROACH: ICD-10-PCS | Performed by: RADIOLOGY

## 2021-01-01 PROCEDURE — 6360000002 HC RX W HCPCS: Performed by: EMERGENCY MEDICINE

## 2021-01-01 PROCEDURE — 2500000003 HC RX 250 WO HCPCS: Performed by: INTERNAL MEDICINE

## 2021-01-01 PROCEDURE — 82728 ASSAY OF FERRITIN: CPT

## 2021-01-01 PROCEDURE — 80053 COMPREHEN METABOLIC PANEL: CPT

## 2021-01-01 PROCEDURE — 83735 ASSAY OF MAGNESIUM: CPT

## 2021-01-01 PROCEDURE — 84100 ASSAY OF PHOSPHORUS: CPT

## 2021-01-01 PROCEDURE — 6370000000 HC RX 637 (ALT 250 FOR IP): Performed by: EMERGENCY MEDICINE

## 2021-01-01 PROCEDURE — 94660 CPAP INITIATION&MGMT: CPT

## 2021-01-01 PROCEDURE — 82330 ASSAY OF CALCIUM: CPT

## 2021-01-01 PROCEDURE — 89220 SPUTUM SPECIMEN COLLECTION: CPT

## 2021-01-01 PROCEDURE — 2000000000 HC ICU R&B

## 2021-01-01 PROCEDURE — 87081 CULTURE SCREEN ONLY: CPT

## 2021-01-01 PROCEDURE — 84133 ASSAY OF URINE POTASSIUM: CPT

## 2021-01-01 PROCEDURE — 81001 URINALYSIS AUTO W/SCOPE: CPT

## 2021-01-01 PROCEDURE — 2700000000 HC OXYGEN THERAPY PER DAY

## 2021-01-01 PROCEDURE — 85018 HEMOGLOBIN: CPT

## 2021-01-01 PROCEDURE — 1200000000 HC SEMI PRIVATE

## 2021-01-01 PROCEDURE — 93306 TTE W/DOPPLER COMPLETE: CPT

## 2021-01-01 PROCEDURE — 90945 DIALYSIS ONE EVALUATION: CPT

## 2021-01-01 PROCEDURE — C1894 INTRO/SHEATH, NON-LASER: HCPCS

## 2021-01-01 PROCEDURE — 96372 THER/PROPH/DIAG INJ SC/IM: CPT | Performed by: PHYSICIAN ASSISTANT

## 2021-01-01 PROCEDURE — 84145 PROCALCITONIN (PCT): CPT

## 2021-01-01 PROCEDURE — 94644 CONT INHLJ TX 1ST HOUR: CPT

## 2021-01-01 PROCEDURE — 82140 ASSAY OF AMMONIA: CPT

## 2021-01-01 PROCEDURE — 96365 THER/PROPH/DIAG IV INF INIT: CPT

## 2021-01-01 PROCEDURE — 83880 ASSAY OF NATRIURETIC PEPTIDE: CPT

## 2021-01-01 PROCEDURE — C1752 CATH,HEMODIALYSIS,SHORT-TERM: HCPCS

## 2021-01-01 PROCEDURE — 2580000003 HC RX 258: Performed by: EMERGENCY MEDICINE

## 2021-01-01 PROCEDURE — 86141 C-REACTIVE PROTEIN HS: CPT

## 2021-01-01 PROCEDURE — 87040 BLOOD CULTURE FOR BACTERIA: CPT

## 2021-01-01 PROCEDURE — 6360000002 HC RX W HCPCS

## 2021-01-01 PROCEDURE — 80202 ASSAY OF VANCOMYCIN: CPT

## 2021-01-01 PROCEDURE — 85007 BL SMEAR W/DIFF WBC COUNT: CPT

## 2021-01-01 PROCEDURE — 84156 ASSAY OF PROTEIN URINE: CPT

## 2021-01-01 PROCEDURE — 87899 AGENT NOS ASSAY W/OPTIC: CPT

## 2021-01-01 PROCEDURE — 84300 ASSAY OF URINE SODIUM: CPT

## 2021-01-01 PROCEDURE — 5A1D70Z PERFORMANCE OF URINARY FILTRATION, INTERMITTENT, LESS THAN 6 HOURS PER DAY: ICD-10-PCS | Performed by: INTERNAL MEDICINE

## 2021-01-01 PROCEDURE — C1769 GUIDE WIRE: HCPCS

## 2021-01-01 PROCEDURE — 76937 US GUIDE VASCULAR ACCESS: CPT

## 2021-01-01 PROCEDURE — 87205 SMEAR GRAM STAIN: CPT

## 2021-01-01 PROCEDURE — 83605 ASSAY OF LACTIC ACID: CPT

## 2021-01-01 PROCEDURE — 5A1955Z RESPIRATORY VENTILATION, GREATER THAN 96 CONSECUTIVE HOURS: ICD-10-PCS | Performed by: NURSE PRACTITIONER

## 2021-01-01 PROCEDURE — 31500 INSERT EMERGENCY AIRWAY: CPT

## 2021-01-01 PROCEDURE — 94002 VENT MGMT INPAT INIT DAY: CPT

## 2021-01-01 PROCEDURE — 85027 COMPLETE CBC AUTOMATED: CPT

## 2021-01-01 PROCEDURE — 94664 DEMO&/EVAL PT USE INHALER: CPT

## 2021-01-01 PROCEDURE — 90935 HEMODIALYSIS ONE EVALUATION: CPT

## 2021-01-01 PROCEDURE — 2709999900 HC NON-CHARGEABLE SUPPLY

## 2021-01-01 PROCEDURE — C1751 CATH, INF, PER/CENT/MIDLINE: HCPCS

## 2021-01-01 PROCEDURE — 82570 ASSAY OF URINE CREATININE: CPT

## 2021-01-01 PROCEDURE — 96375 TX/PRO/DX INJ NEW DRUG ADDON: CPT

## 2021-01-01 PROCEDURE — 93005 ELECTROCARDIOGRAM TRACING: CPT | Performed by: INTERNAL MEDICINE

## 2021-01-01 PROCEDURE — 06HY33Z INSERTION OF INFUSION DEVICE INTO LOWER VEIN, PERCUTANEOUS APPROACH: ICD-10-PCS | Performed by: RADIOLOGY

## 2021-01-01 PROCEDURE — 87340 HEPATITIS B SURFACE AG IA: CPT

## 2021-01-01 PROCEDURE — 84484 ASSAY OF TROPONIN QUANT: CPT

## 2021-01-01 PROCEDURE — 74018 RADEX ABDOMEN 1 VIEW: CPT

## 2021-01-01 PROCEDURE — 83615 LACTATE (LD) (LDH) ENZYME: CPT

## 2021-01-01 PROCEDURE — 82436 ASSAY OF URINE CHLORIDE: CPT

## 2021-01-01 PROCEDURE — 36569 INSJ PICC 5 YR+ W/O IMAGING: CPT

## 2021-01-01 PROCEDURE — 93880 EXTRACRANIAL BILAT STUDY: CPT

## 2021-01-01 PROCEDURE — 99284 EMERGENCY DEPT VISIT MOD MDM: CPT

## 2021-01-01 PROCEDURE — 99285 EMERGENCY DEPT VISIT HI MDM: CPT

## 2021-01-01 PROCEDURE — 86706 HEP B SURFACE ANTIBODY: CPT

## 2021-01-01 PROCEDURE — 36556 INSERT NON-TUNNEL CV CATH: CPT

## 2021-01-01 PROCEDURE — 82805 BLOOD GASES W/O2 SATURATION: CPT

## 2021-01-01 PROCEDURE — 32557 INSERT CATH PLEURA W/ IMAGE: CPT

## 2021-01-01 PROCEDURE — 80069 RENAL FUNCTION PANEL: CPT

## 2021-01-01 PROCEDURE — 99213 OFFICE O/P EST LOW 20 MIN: CPT | Performed by: PHYSICIAN ASSISTANT

## 2021-01-01 PROCEDURE — 83036 HEMOGLOBIN GLYCOSYLATED A1C: CPT

## 2021-01-01 PROCEDURE — 87070 CULTURE OTHR SPECIMN AEROBIC: CPT

## 2021-01-01 RX ORDER — VANCOMYCIN 2 G/400ML
2000 INJECTION, SOLUTION INTRAVENOUS ONCE
Status: COMPLETED | OUTPATIENT
Start: 2021-01-01 | End: 2021-01-01

## 2021-01-01 RX ORDER — ASPIRIN 81 MG/1
81 TABLET ORAL DAILY
Status: DISCONTINUED | OUTPATIENT
Start: 2021-01-01 | End: 2021-01-01 | Stop reason: SDUPTHER

## 2021-01-01 RX ORDER — LORAZEPAM 2 MG/ML
INJECTION INTRAMUSCULAR EVERY 6 HOURS PRN
Status: CANCELLED | OUTPATIENT
Start: 2021-01-01

## 2021-01-01 RX ORDER — LORAZEPAM 2 MG/ML
3 INJECTION INTRAMUSCULAR
Status: DISCONTINUED | OUTPATIENT
Start: 2021-01-01 | End: 2021-01-01

## 2021-01-01 RX ORDER — METOPROLOL TARTRATE 5 MG/5ML
5 INJECTION INTRAVENOUS ONCE
Status: COMPLETED | OUTPATIENT
Start: 2021-01-01 | End: 2021-01-01

## 2021-01-01 RX ORDER — ZINC SULFATE 50(220)MG
50 CAPSULE ORAL DAILY
Status: DISCONTINUED | OUTPATIENT
Start: 2021-01-01 | End: 2021-01-01 | Stop reason: HOSPADM

## 2021-01-01 RX ORDER — IBUPROFEN 600 MG/1
600 TABLET ORAL EVERY 8 HOURS PRN
Status: DISPENSED | OUTPATIENT
Start: 2021-01-01 | End: 2021-01-01

## 2021-01-01 RX ORDER — ALBUTEROL SULFATE 90 UG/1
2 AEROSOL, METERED RESPIRATORY (INHALATION) 4 TIMES DAILY
Status: DISCONTINUED | OUTPATIENT
Start: 2021-01-01 | End: 2021-01-01

## 2021-01-01 RX ORDER — INSULIN GLARGINE 100 [IU]/ML
10 INJECTION, SOLUTION SUBCUTANEOUS NIGHTLY
Status: DISCONTINUED | OUTPATIENT
Start: 2021-01-01 | End: 2021-01-01 | Stop reason: HOSPADM

## 2021-01-01 RX ORDER — MIDAZOLAM HYDROCHLORIDE 2 MG/2ML
5 INJECTION, SOLUTION INTRAMUSCULAR; INTRAVENOUS ONCE
Status: COMPLETED | OUTPATIENT
Start: 2021-01-01 | End: 2021-01-01

## 2021-01-01 RX ORDER — ACETAMINOPHEN 650 MG/1
650 SUPPOSITORY RECTAL EVERY 6 HOURS PRN
Status: DISCONTINUED | OUTPATIENT
Start: 2021-01-01 | End: 2021-01-01 | Stop reason: HOSPADM

## 2021-01-01 RX ORDER — DEXAMETHASONE SODIUM PHOSPHATE 10 MG/ML
10 INJECTION INTRAMUSCULAR; INTRAVENOUS EVERY 24 HOURS
Status: COMPLETED | OUTPATIENT
Start: 2021-01-01 | End: 2021-01-01

## 2021-01-01 RX ORDER — SODIUM CHLORIDE 0.9 % (FLUSH) 0.9 %
5-40 SYRINGE (ML) INJECTION PRN
Status: DISCONTINUED | OUTPATIENT
Start: 2021-01-01 | End: 2021-01-01 | Stop reason: HOSPADM

## 2021-01-01 RX ORDER — LANOLIN ALCOHOL/MO/W.PET/CERES
3 CREAM (GRAM) TOPICAL NIGHTLY
Status: DISCONTINUED | OUTPATIENT
Start: 2021-01-01 | End: 2021-01-01

## 2021-01-01 RX ORDER — ONDANSETRON 2 MG/ML
4 INJECTION INTRAMUSCULAR; INTRAVENOUS EVERY 6 HOURS PRN
Status: DISCONTINUED | OUTPATIENT
Start: 2021-01-01 | End: 2021-01-01 | Stop reason: HOSPADM

## 2021-01-01 RX ORDER — CALCIUM GLUCONATE 20 MG/ML
1000 INJECTION, SOLUTION INTRAVENOUS PRN
Status: DISCONTINUED | OUTPATIENT
Start: 2021-01-01 | End: 2021-01-01 | Stop reason: HOSPADM

## 2021-01-01 RX ORDER — POTASSIUM CHLORIDE 20 MEQ/1
40 TABLET, EXTENDED RELEASE ORAL PRN
Status: DISCONTINUED | OUTPATIENT
Start: 2021-01-01 | End: 2021-01-01

## 2021-01-01 RX ORDER — BENZONATATE 100 MG/1
100 CAPSULE ORAL 3 TIMES DAILY PRN
Qty: 20 CAPSULE | Refills: 0 | Status: SHIPPED | OUTPATIENT
Start: 2021-01-01

## 2021-01-01 RX ORDER — ALBUMIN, HUMAN INJ 5% 5 %
SOLUTION INTRAVENOUS
Status: COMPLETED
Start: 2021-01-01 | End: 2021-01-01

## 2021-01-01 RX ORDER — ASPIRIN 300 MG/1
300 SUPPOSITORY RECTAL DAILY
Status: DISCONTINUED | OUTPATIENT
Start: 2021-01-01 | End: 2021-01-01 | Stop reason: SDUPTHER

## 2021-01-01 RX ORDER — SODIUM CHLORIDE 9 MG/ML
25 INJECTION, SOLUTION INTRAVENOUS PRN
Status: DISCONTINUED | OUTPATIENT
Start: 2021-01-01 | End: 2021-01-01 | Stop reason: HOSPADM

## 2021-01-01 RX ORDER — DILTIAZEM HYDROCHLORIDE 5 MG/ML
10 INJECTION INTRAVENOUS ONCE
Status: COMPLETED | OUTPATIENT
Start: 2021-01-01 | End: 2021-01-01

## 2021-01-01 RX ORDER — LORAZEPAM 2 MG/ML
1 INJECTION INTRAMUSCULAR
Status: DISCONTINUED | OUTPATIENT
Start: 2021-01-01 | End: 2021-01-01

## 2021-01-01 RX ORDER — CALCIUM GLUCONATE 20 MG/ML
2000 INJECTION, SOLUTION INTRAVENOUS ONCE
Status: COMPLETED | OUTPATIENT
Start: 2021-01-01 | End: 2021-01-01

## 2021-01-01 RX ORDER — ALBUTEROL SULFATE 2.5 MG/3ML
SOLUTION RESPIRATORY (INHALATION)
Status: DISPENSED
Start: 2021-01-01 | End: 2021-01-01

## 2021-01-01 RX ORDER — ASPIRIN 300 MG/1
300 SUPPOSITORY RECTAL ONCE
Status: DISCONTINUED | OUTPATIENT
Start: 2021-01-01 | End: 2021-01-01

## 2021-01-01 RX ORDER — LIDOCAINE HYDROCHLORIDE 10 MG/ML
5 INJECTION, SOLUTION EPIDURAL; INFILTRATION; INTRACAUDAL; PERINEURAL ONCE
Status: DISCONTINUED | OUTPATIENT
Start: 2021-01-01 | End: 2021-01-01 | Stop reason: HOSPADM

## 2021-01-01 RX ORDER — MULTIVITAMIN WITH IRON
1 TABLET ORAL DAILY
Status: DISCONTINUED | OUTPATIENT
Start: 2021-01-01 | End: 2021-01-01 | Stop reason: HOSPADM

## 2021-01-01 RX ORDER — MINERAL OIL AND WHITE PETROLATUM 150; 830 MG/G; MG/G
OINTMENT OPHTHALMIC
Status: DISCONTINUED | OUTPATIENT
Start: 2021-01-01 | End: 2021-01-01 | Stop reason: HOSPADM

## 2021-01-01 RX ORDER — DEXTROSE MONOHYDRATE 50 MG/ML
100 INJECTION, SOLUTION INTRAVENOUS PRN
Status: DISCONTINUED | OUTPATIENT
Start: 2021-01-01 | End: 2021-01-01 | Stop reason: HOSPADM

## 2021-01-01 RX ORDER — KETOROLAC TROMETHAMINE 30 MG/ML
15 INJECTION, SOLUTION INTRAMUSCULAR; INTRAVENOUS ONCE
Status: COMPLETED | OUTPATIENT
Start: 2021-01-01 | End: 2021-01-01

## 2021-01-01 RX ORDER — ONDANSETRON 4 MG/1
4 TABLET, ORALLY DISINTEGRATING ORAL EVERY 8 HOURS PRN
Status: DISCONTINUED | OUTPATIENT
Start: 2021-01-01 | End: 2021-01-01 | Stop reason: HOSPADM

## 2021-01-01 RX ORDER — ALBUTEROL SULFATE 90 UG/1
2 AEROSOL, METERED RESPIRATORY (INHALATION) 2 TIMES DAILY
Status: DISCONTINUED | OUTPATIENT
Start: 2021-01-01 | End: 2021-01-01

## 2021-01-01 RX ORDER — NICOTINE POLACRILEX 4 MG
15 LOZENGE BUCCAL PRN
Status: DISCONTINUED | OUTPATIENT
Start: 2021-01-01 | End: 2021-01-01 | Stop reason: HOSPADM

## 2021-01-01 RX ORDER — ONDANSETRON 4 MG/1
4 TABLET, ORALLY DISINTEGRATING ORAL EVERY 8 HOURS PRN
Status: DISCONTINUED | OUTPATIENT
Start: 2021-01-01 | End: 2021-01-01 | Stop reason: SDUPTHER

## 2021-01-01 RX ORDER — COLCHICINE 0.6 MG/1
TABLET ORAL
Qty: 3 TABLET | Refills: 0 | Status: ON HOLD | OUTPATIENT
Start: 2021-01-01 | End: 2021-01-01

## 2021-01-01 RX ORDER — METHYLPREDNISOLONE ACETATE 40 MG/ML
80 INJECTION, SUSPENSION INTRA-ARTICULAR; INTRALESIONAL; INTRAMUSCULAR; SOFT TISSUE ONCE
Status: COMPLETED | OUTPATIENT
Start: 2021-01-01 | End: 2021-01-01

## 2021-01-01 RX ORDER — LORAZEPAM 2 MG/ML
2 INJECTION INTRAMUSCULAR ONCE
Status: COMPLETED | OUTPATIENT
Start: 2021-01-01 | End: 2021-01-01

## 2021-01-01 RX ORDER — FENTANYL CITRATE 50 UG/ML
100 INJECTION, SOLUTION INTRAMUSCULAR; INTRAVENOUS ONCE
Status: COMPLETED | OUTPATIENT
Start: 2021-01-01 | End: 2021-01-01

## 2021-01-01 RX ORDER — DIPHENHYDRAMINE HCL 25 MG
25 TABLET ORAL ONCE
Status: COMPLETED | OUTPATIENT
Start: 2021-01-01 | End: 2021-01-01

## 2021-01-01 RX ORDER — HEPARIN SODIUM 1000 [USP'U]/ML
2800 INJECTION, SOLUTION INTRAVENOUS; SUBCUTANEOUS PRN
Status: DISCONTINUED | OUTPATIENT
Start: 2021-01-01 | End: 2021-01-01 | Stop reason: HOSPADM

## 2021-01-01 RX ORDER — LORAZEPAM 1 MG/1
3 TABLET ORAL
Status: DISCONTINUED | OUTPATIENT
Start: 2021-01-01 | End: 2021-01-01

## 2021-01-01 RX ORDER — ATORVASTATIN CALCIUM 80 MG/1
80 TABLET, FILM COATED ORAL NIGHTLY
Status: DISCONTINUED | OUTPATIENT
Start: 2021-01-01 | End: 2021-01-01 | Stop reason: HOSPADM

## 2021-01-01 RX ORDER — LORAZEPAM 1 MG/1
2 TABLET ORAL
Status: DISCONTINUED | OUTPATIENT
Start: 2021-01-01 | End: 2021-01-01

## 2021-01-01 RX ORDER — DEXAMETHASONE SODIUM PHOSPHATE 10 MG/ML
8 INJECTION, SOLUTION INTRAMUSCULAR; INTRAVENOUS EVERY 6 HOURS
Status: DISCONTINUED | OUTPATIENT
Start: 2021-01-01 | End: 2021-01-01

## 2021-01-01 RX ORDER — DEXAMETHASONE SODIUM PHOSPHATE 10 MG/ML
10 INJECTION, SOLUTION INTRAMUSCULAR; INTRAVENOUS EVERY 24 HOURS
Status: DISCONTINUED | OUTPATIENT
Start: 2021-01-01 | End: 2021-01-01

## 2021-01-01 RX ORDER — VANCOMYCIN 1.75 G/350ML
1250 INJECTION, SOLUTION INTRAVENOUS ONCE
Status: COMPLETED | OUTPATIENT
Start: 2021-01-01 | End: 2021-01-01

## 2021-01-01 RX ORDER — ALBUMIN, HUMAN INJ 5% 5 %
25 SOLUTION INTRAVENOUS ONCE
Status: COMPLETED | OUTPATIENT
Start: 2021-01-01 | End: 2021-01-01

## 2021-01-01 RX ORDER — LANOLIN ALCOHOL/MO/W.PET/CERES
100 CREAM (GRAM) TOPICAL DAILY
Status: DISCONTINUED | OUTPATIENT
Start: 2021-01-01 | End: 2021-01-01 | Stop reason: HOSPADM

## 2021-01-01 RX ORDER — ALBUMIN (HUMAN) 12.5 G/50ML
25 SOLUTION INTRAVENOUS ONCE
Status: COMPLETED | OUTPATIENT
Start: 2021-01-01 | End: 2021-01-01

## 2021-01-01 RX ORDER — ASCORBIC ACID 500 MG
1000 TABLET ORAL DAILY
Status: DISCONTINUED | OUTPATIENT
Start: 2021-01-01 | End: 2021-01-01 | Stop reason: HOSPADM

## 2021-01-01 RX ORDER — MIDAZOLAM HYDROCHLORIDE 2 MG/2ML
2 INJECTION, SOLUTION INTRAMUSCULAR; INTRAVENOUS ONCE
Status: COMPLETED | OUTPATIENT
Start: 2021-01-01 | End: 2021-01-01

## 2021-01-01 RX ORDER — FAMOTIDINE 20 MG/1
20 TABLET, FILM COATED ORAL 2 TIMES DAILY
Status: DISCONTINUED | OUTPATIENT
Start: 2021-01-01 | End: 2021-01-01

## 2021-01-01 RX ORDER — ALBUMIN, HUMAN INJ 5% 5 %
SOLUTION INTRAVENOUS
Status: DISCONTINUED
Start: 2021-01-01 | End: 2021-01-01 | Stop reason: HOSPADM

## 2021-01-01 RX ORDER — POTASSIUM CHLORIDE 7.45 MG/ML
10 INJECTION INTRAVENOUS PRN
Status: DISCONTINUED | OUTPATIENT
Start: 2021-01-01 | End: 2021-01-01 | Stop reason: HOSPADM

## 2021-01-01 RX ORDER — MAGNESIUM SULFATE 1 G/100ML
1000 INJECTION INTRAVENOUS PRN
Status: DISCONTINUED | OUTPATIENT
Start: 2021-01-01 | End: 2021-01-01 | Stop reason: HOSPADM

## 2021-01-01 RX ORDER — ACETAMINOPHEN 325 MG/1
650 TABLET ORAL EVERY 6 HOURS PRN
Status: DISCONTINUED | OUTPATIENT
Start: 2021-01-01 | End: 2021-01-01 | Stop reason: HOSPADM

## 2021-01-01 RX ORDER — DOXYCYCLINE HYCLATE 100 MG
100 TABLET ORAL EVERY 12 HOURS SCHEDULED
Status: DISCONTINUED | OUTPATIENT
Start: 2021-01-01 | End: 2021-01-01

## 2021-01-01 RX ORDER — GUAIFENESIN/DEXTROMETHORPHAN 100-10MG/5
5 SYRUP ORAL EVERY 4 HOURS PRN
Status: DISCONTINUED | OUTPATIENT
Start: 2021-01-01 | End: 2021-01-01 | Stop reason: HOSPADM

## 2021-01-01 RX ORDER — LORAZEPAM 2 MG/ML
1 INJECTION INTRAMUSCULAR ONCE
Status: DISCONTINUED | OUTPATIENT
Start: 2021-01-01 | End: 2021-01-01

## 2021-01-01 RX ORDER — LORAZEPAM 2 MG/ML
INJECTION INTRAMUSCULAR
Status: COMPLETED
Start: 2021-01-01 | End: 2021-01-01

## 2021-01-01 RX ORDER — LORAZEPAM 2 MG/ML
2 INJECTION INTRAMUSCULAR
Status: DISCONTINUED | OUTPATIENT
Start: 2021-01-01 | End: 2021-01-01

## 2021-01-01 RX ORDER — DOXYCYCLINE HYCLATE 100 MG
100 TABLET ORAL 2 TIMES DAILY
Qty: 14 TABLET | Refills: 0 | Status: SHIPPED | OUTPATIENT
Start: 2021-01-01 | End: 2021-01-01

## 2021-01-01 RX ORDER — HEPARIN SODIUM 1000 [USP'U]/ML
2800 INJECTION, SOLUTION INTRAVENOUS; SUBCUTANEOUS ONCE
Status: COMPLETED | OUTPATIENT
Start: 2021-01-01 | End: 2021-01-01

## 2021-01-01 RX ORDER — LORAZEPAM 1 MG/1
4 TABLET ORAL
Status: DISCONTINUED | OUTPATIENT
Start: 2021-01-01 | End: 2021-01-01

## 2021-01-01 RX ORDER — VANCOMYCIN 1.5 G/300ML
1500 INJECTION, SOLUTION INTRAVENOUS ONCE
Status: COMPLETED | OUTPATIENT
Start: 2021-01-01 | End: 2021-01-01

## 2021-01-01 RX ORDER — 0.9 % SODIUM CHLORIDE 0.9 %
1000 INTRAVENOUS SOLUTION INTRAVENOUS ONCE
Status: COMPLETED | OUTPATIENT
Start: 2021-01-01 | End: 2021-01-01

## 2021-01-01 RX ORDER — ASPIRIN 81 MG/1
81 TABLET, CHEWABLE ORAL DAILY
Status: DISCONTINUED | OUTPATIENT
Start: 2021-01-01 | End: 2021-01-01

## 2021-01-01 RX ORDER — SODIUM CHLORIDE 0.9 % (FLUSH) 0.9 %
5-40 SYRINGE (ML) INJECTION EVERY 12 HOURS SCHEDULED
Status: DISCONTINUED | OUTPATIENT
Start: 2021-01-01 | End: 2021-01-01 | Stop reason: HOSPADM

## 2021-01-01 RX ORDER — DEXTROSE MONOHYDRATE 25 G/50ML
12.5 INJECTION, SOLUTION INTRAVENOUS PRN
Status: DISCONTINUED | OUTPATIENT
Start: 2021-01-01 | End: 2021-01-01

## 2021-01-01 RX ORDER — CALCIUM GLUCONATE 20 MG/ML
1000 INJECTION, SOLUTION INTRAVENOUS ONCE
Status: COMPLETED | OUTPATIENT
Start: 2021-01-01 | End: 2021-01-01

## 2021-01-01 RX ORDER — ALBUTEROL SULFATE 90 UG/1
2 AEROSOL, METERED RESPIRATORY (INHALATION) ONCE
Status: COMPLETED | OUTPATIENT
Start: 2021-01-01 | End: 2021-01-01

## 2021-01-01 RX ORDER — ASPIRIN 300 MG/1
300 SUPPOSITORY RECTAL DAILY
Status: DISCONTINUED | OUTPATIENT
Start: 2021-01-01 | End: 2021-01-01 | Stop reason: HOSPADM

## 2021-01-01 RX ORDER — DEXTROSE MONOHYDRATE 25 G/50ML
25 INJECTION, SOLUTION INTRAVENOUS ONCE
Status: COMPLETED | OUTPATIENT
Start: 2021-01-01 | End: 2021-01-01

## 2021-01-01 RX ORDER — FENTANYL CITRATE 50 UG/ML
50 INJECTION, SOLUTION INTRAMUSCULAR; INTRAVENOUS ONCE
Status: COMPLETED | OUTPATIENT
Start: 2021-01-01 | End: 2021-01-01

## 2021-01-01 RX ORDER — NICOTINE POLACRILEX 4 MG
15 LOZENGE BUCCAL PRN
Status: DISCONTINUED | OUTPATIENT
Start: 2021-01-01 | End: 2021-01-01

## 2021-01-01 RX ORDER — PANTOPRAZOLE SODIUM 40 MG/10ML
40 INJECTION, POWDER, LYOPHILIZED, FOR SOLUTION INTRAVENOUS 2 TIMES DAILY
Status: DISCONTINUED | OUTPATIENT
Start: 2021-01-01 | End: 2021-01-01 | Stop reason: HOSPADM

## 2021-01-01 RX ORDER — ALBUMIN (HUMAN) 12.5 G/50ML
25 SOLUTION INTRAVENOUS ONCE
Status: DISCONTINUED | OUTPATIENT
Start: 2021-01-01 | End: 2021-01-01

## 2021-01-01 RX ORDER — ALBUTEROL SULFATE 90 UG/1
2 AEROSOL, METERED RESPIRATORY (INHALATION) EVERY 4 HOURS PRN
Status: DISCONTINUED | OUTPATIENT
Start: 2021-01-01 | End: 2021-01-01

## 2021-01-01 RX ORDER — PROPOFOL 10 MG/ML
5-50 INJECTION, EMULSION INTRAVENOUS
Status: DISCONTINUED | OUTPATIENT
Start: 2021-01-01 | End: 2021-01-01 | Stop reason: HOSPADM

## 2021-01-01 RX ORDER — CALCIUM GLUCONATE 20 MG/ML
2000 INJECTION, SOLUTION INTRAVENOUS PRN
Status: DISCONTINUED | OUTPATIENT
Start: 2021-01-01 | End: 2021-01-01 | Stop reason: HOSPADM

## 2021-01-01 RX ORDER — DEXAMETHASONE SODIUM PHOSPHATE 4 MG/ML
6 INJECTION, SOLUTION INTRA-ARTICULAR; INTRALESIONAL; INTRAMUSCULAR; INTRAVENOUS; SOFT TISSUE EVERY 24 HOURS
Status: DISCONTINUED | OUTPATIENT
Start: 2021-01-01 | End: 2021-01-01

## 2021-01-01 RX ORDER — LORAZEPAM 1 MG/1
1 TABLET ORAL
Status: DISCONTINUED | OUTPATIENT
Start: 2021-01-01 | End: 2021-01-01

## 2021-01-01 RX ORDER — VANCOMYCIN 1.75 G/350ML
1250 INJECTION, SOLUTION INTRAVENOUS EVERY 12 HOURS
Status: DISCONTINUED | OUTPATIENT
Start: 2021-01-01 | End: 2021-01-01

## 2021-01-01 RX ORDER — POTASSIUM CHLORIDE 29.8 MG/ML
20 INJECTION INTRAVENOUS PRN
Status: DISCONTINUED | OUTPATIENT
Start: 2021-01-01 | End: 2021-01-01 | Stop reason: HOSPADM

## 2021-01-01 RX ORDER — ETOMIDATE 2 MG/ML
20 INJECTION INTRAVENOUS ONCE
Status: COMPLETED | OUTPATIENT
Start: 2021-01-01 | End: 2021-01-01

## 2021-01-01 RX ORDER — CALCIUM GLUCONATE 94 MG/ML
2000 INJECTION, SOLUTION INTRAVENOUS ONCE
Status: DISCONTINUED | OUTPATIENT
Start: 2021-01-01 | End: 2021-01-01 | Stop reason: CLARIF

## 2021-01-01 RX ORDER — POLYETHYLENE GLYCOL 3350 17 G/17G
17 POWDER, FOR SOLUTION ORAL DAILY PRN
Status: DISCONTINUED | OUTPATIENT
Start: 2021-01-01 | End: 2021-01-01 | Stop reason: HOSPADM

## 2021-01-01 RX ORDER — GUAIFENESIN AND CODEINE PHOSPHATE 100; 10 MG/5ML; MG/5ML
10 SOLUTION ORAL ONCE
Status: COMPLETED | OUTPATIENT
Start: 2021-01-01 | End: 2021-01-01

## 2021-01-01 RX ORDER — VANCOMYCIN 750 MG/150ML
750 INJECTION, SOLUTION INTRAVENOUS EVERY 12 HOURS
Status: DISCONTINUED | OUTPATIENT
Start: 2021-01-01 | End: 2021-01-01

## 2021-01-01 RX ORDER — SODIUM CHLORIDE 9 MG/ML
INJECTION, SOLUTION INTRAVENOUS CONTINUOUS
Status: DISCONTINUED | OUTPATIENT
Start: 2021-01-01 | End: 2021-01-01

## 2021-01-01 RX ORDER — DEXTROSE MONOHYDRATE 25 G/50ML
12.5 INJECTION, SOLUTION INTRAVENOUS PRN
Status: DISCONTINUED | OUTPATIENT
Start: 2021-01-01 | End: 2021-01-01 | Stop reason: HOSPADM

## 2021-01-01 RX ORDER — ONDANSETRON 2 MG/ML
4 INJECTION INTRAMUSCULAR; INTRAVENOUS EVERY 6 HOURS PRN
Status: DISCONTINUED | OUTPATIENT
Start: 2021-01-01 | End: 2021-01-01

## 2021-01-01 RX ORDER — METHYLPREDNISOLONE ACETATE 80 MG/ML
80 INJECTION, SUSPENSION INTRA-ARTICULAR; INTRALESIONAL; INTRAMUSCULAR; SOFT TISSUE ONCE
Status: DISCONTINUED | OUTPATIENT
Start: 2021-01-01 | End: 2021-01-01

## 2021-01-01 RX ORDER — ALBUMIN (HUMAN) 12.5 G/50ML
50 SOLUTION INTRAVENOUS ONCE
Status: DISCONTINUED | OUTPATIENT
Start: 2021-01-01 | End: 2021-01-01

## 2021-01-01 RX ORDER — ALBUTEROL SULFATE 90 UG/1
4 AEROSOL, METERED RESPIRATORY (INHALATION) EVERY 4 HOURS
Status: DISCONTINUED | OUTPATIENT
Start: 2021-01-01 | End: 2021-01-01

## 2021-01-01 RX ORDER — ALBUTEROL SULFATE 90 UG/1
4 AEROSOL, METERED RESPIRATORY (INHALATION) EVERY 4 HOURS
Status: DISCONTINUED | OUTPATIENT
Start: 2021-01-01 | End: 2021-01-01 | Stop reason: HOSPADM

## 2021-01-01 RX ORDER — LORAZEPAM 2 MG/ML
4 INJECTION INTRAMUSCULAR
Status: DISCONTINUED | OUTPATIENT
Start: 2021-01-01 | End: 2021-01-01

## 2021-01-01 RX ORDER — DEXTROSE MONOHYDRATE 50 MG/ML
100 INJECTION, SOLUTION INTRAVENOUS PRN
Status: DISCONTINUED | OUTPATIENT
Start: 2021-01-01 | End: 2021-01-01

## 2021-01-01 RX ORDER — POLYETHYLENE GLYCOL 3350 17 G/17G
17 POWDER, FOR SOLUTION ORAL DAILY PRN
Status: DISCONTINUED | OUTPATIENT
Start: 2021-01-01 | End: 2021-01-01 | Stop reason: SDUPTHER

## 2021-01-01 RX ORDER — ONDANSETRON 2 MG/ML
4 INJECTION INTRAMUSCULAR; INTRAVENOUS EVERY 6 HOURS PRN
Status: DISCONTINUED | OUTPATIENT
Start: 2021-01-01 | End: 2021-01-01 | Stop reason: SDUPTHER

## 2021-01-01 RX ORDER — ASPIRIN 300 MG/1
300 SUPPOSITORY RECTAL DAILY
Status: DISCONTINUED | OUTPATIENT
Start: 2021-01-01 | End: 2021-01-01

## 2021-01-01 RX ORDER — IPRATROPIUM BROMIDE AND ALBUTEROL SULFATE 2.5; .5 MG/3ML; MG/3ML
SOLUTION RESPIRATORY (INHALATION)
Status: DISPENSED
Start: 2021-01-01 | End: 2021-01-01

## 2021-01-01 RX ORDER — HEPARIN SODIUM 5000 [USP'U]/ML
5000 INJECTION, SOLUTION INTRAVENOUS; SUBCUTANEOUS 2 TIMES DAILY
Status: DISCONTINUED | OUTPATIENT
Start: 2021-01-01 | End: 2021-01-01 | Stop reason: HOSPADM

## 2021-01-01 RX ORDER — LORAZEPAM 2 MG/ML
1 INJECTION INTRAMUSCULAR EVERY 4 HOURS PRN
Status: DISCONTINUED | OUTPATIENT
Start: 2021-01-01 | End: 2021-01-01

## 2021-01-01 RX ADMIN — SODIUM CHLORIDE, PRESERVATIVE FREE 10 ML: 5 INJECTION INTRAVENOUS at 21:06

## 2021-01-01 RX ADMIN — ALBUTEROL SULFATE 4 PUFF: 90 AEROSOL, METERED RESPIRATORY (INHALATION) at 12:28

## 2021-01-01 RX ADMIN — SODIUM BICARBONATE: 84 INJECTION, SOLUTION INTRAVENOUS at 17:39

## 2021-01-01 RX ADMIN — PROPOFOL 20 MCG/KG/MIN: 10 INJECTION, EMULSION INTRAVENOUS at 17:55

## 2021-01-01 RX ADMIN — Medication: at 22:31

## 2021-01-01 RX ADMIN — ENOXAPARIN SODIUM 30 MG: 30 INJECTION SUBCUTANEOUS at 09:21

## 2021-01-01 RX ADMIN — INSULIN GLARGINE 10 UNITS: 100 INJECTION, SOLUTION SUBCUTANEOUS at 21:06

## 2021-01-01 RX ADMIN — SODIUM CHLORIDE: 9 INJECTION, SOLUTION INTRAVENOUS at 08:03

## 2021-01-01 RX ADMIN — PIPERACILLIN AND TAZOBACTAM 3375 MG: 3; .375 INJECTION, POWDER, LYOPHILIZED, FOR SOLUTION INTRAVENOUS at 01:04

## 2021-01-01 RX ADMIN — CALCIUM GLUCONATE 1000 MG: 20 INJECTION, SOLUTION INTRAVENOUS at 08:57

## 2021-01-01 RX ADMIN — ENOXAPARIN SODIUM 30 MG: 30 INJECTION SUBCUTANEOUS at 20:18

## 2021-01-01 RX ADMIN — OXYCODONE HYDROCHLORIDE AND ACETAMINOPHEN 1000 MG: 500 TABLET ORAL at 09:48

## 2021-01-01 RX ADMIN — PIPERACILLIN AND TAZOBACTAM 3375 MG: 3; .375 INJECTION, POWDER, LYOPHILIZED, FOR SOLUTION INTRAVENOUS at 14:31

## 2021-01-01 RX ADMIN — CALCIUM GLUCONATE 2000 MG: 20 INJECTION, SOLUTION INTRAVENOUS at 08:49

## 2021-01-01 RX ADMIN — SODIUM CHLORIDE, PRESERVATIVE FREE 10 ML: 5 INJECTION INTRAVENOUS at 08:06

## 2021-01-01 RX ADMIN — HEPARIN SODIUM 2800 UNITS: 1000 INJECTION INTRAVENOUS; SUBCUTANEOUS at 03:01

## 2021-01-01 RX ADMIN — PIPERACILLIN AND TAZOBACTAM 3375 MG: 3; .375 INJECTION, POWDER, FOR SOLUTION INTRAVENOUS at 22:09

## 2021-01-01 RX ADMIN — SODIUM CHLORIDE, PRESERVATIVE FREE 10 ML: 5 INJECTION INTRAVENOUS at 08:30

## 2021-01-01 RX ADMIN — PHENYLEPHRINE HYDROCHLORIDE 200 MCG/MIN: 10 INJECTION INTRAVENOUS at 20:30

## 2021-01-01 RX ADMIN — ASPIRIN 300 MG: 300 SUPPOSITORY RECTAL at 09:32

## 2021-01-01 RX ADMIN — INSULIN GLARGINE 10 UNITS: 100 INJECTION, SOLUTION SUBCUTANEOUS at 22:00

## 2021-01-01 RX ADMIN — SODIUM CHLORIDE, PRESERVATIVE FREE 10 ML: 5 INJECTION INTRAVENOUS at 08:45

## 2021-01-01 RX ADMIN — SODIUM CHLORIDE, PRESERVATIVE FREE 10 ML: 5 INJECTION INTRAVENOUS at 21:07

## 2021-01-01 RX ADMIN — ALBUTEROL SULFATE 2 PUFF: 90 AEROSOL, METERED RESPIRATORY (INHALATION) at 19:22

## 2021-01-01 RX ADMIN — DEXTROSE MONOHYDRATE 25 G: 25 INJECTION, SOLUTION INTRAVENOUS at 14:28

## 2021-01-01 RX ADMIN — Medication: at 01:31

## 2021-01-01 RX ADMIN — DEXAMETHASONE SODIUM PHOSPHATE 10 MG: 10 INJECTION INTRAMUSCULAR; INTRAVENOUS at 09:03

## 2021-01-01 RX ADMIN — SODIUM CHLORIDE, PRESERVATIVE FREE 10 ML: 5 INJECTION INTRAVENOUS at 10:10

## 2021-01-01 RX ADMIN — LORAZEPAM 1 MG: 2 INJECTION INTRAMUSCULAR; INTRAVENOUS at 21:54

## 2021-01-01 RX ADMIN — FAMOTIDINE 20 MG: 10 INJECTION, SOLUTION INTRAVENOUS at 11:06

## 2021-01-01 RX ADMIN — PIPERACILLIN AND TAZOBACTAM 3375 MG: 3; .375 INJECTION, POWDER, FOR SOLUTION INTRAVENOUS at 06:15

## 2021-01-01 RX ADMIN — IPRATROPIUM BROMIDE 2 PUFF: 17 AEROSOL, METERED RESPIRATORY (INHALATION) at 15:40

## 2021-01-01 RX ADMIN — LORAZEPAM 1 MG: 2 INJECTION INTRAMUSCULAR; INTRAVENOUS at 23:02

## 2021-01-01 RX ADMIN — IPRATROPIUM BROMIDE 4 PUFF: 17 AEROSOL, METERED RESPIRATORY (INHALATION) at 00:34

## 2021-01-01 RX ADMIN — ALBUTEROL SULFATE 4 PUFF: 90 AEROSOL, METERED RESPIRATORY (INHALATION) at 12:13

## 2021-01-01 RX ADMIN — CALCIUM GLUCONATE 1000 MG: 20 INJECTION, SOLUTION INTRAVENOUS at 15:19

## 2021-01-01 RX ADMIN — SODIUM BICARBONATE: 84 INJECTION, SOLUTION INTRAVENOUS at 15:29

## 2021-01-01 RX ADMIN — PROPOFOL 20 MCG/KG/MIN: 10 INJECTION, EMULSION INTRAVENOUS at 06:40

## 2021-01-01 RX ADMIN — PROPOFOL 25 MCG/KG/MIN: 10 INJECTION, EMULSION INTRAVENOUS at 04:15

## 2021-01-01 RX ADMIN — ZINC SULFATE 220 MG (50 MG) CAPSULE 50 MG: CAPSULE at 08:55

## 2021-01-01 RX ADMIN — PIPERACILLIN AND TAZOBACTAM 3375 MG: 3; .375 INJECTION, POWDER, FOR SOLUTION INTRAVENOUS at 06:52

## 2021-01-01 RX ADMIN — Medication: at 19:22

## 2021-01-01 RX ADMIN — OXYCODONE HYDROCHLORIDE AND ACETAMINOPHEN 1000 MG: 500 TABLET ORAL at 09:21

## 2021-01-01 RX ADMIN — IPRATROPIUM BROMIDE 4 PUFF: 17 AEROSOL, METERED RESPIRATORY (INHALATION) at 15:45

## 2021-01-01 RX ADMIN — FAMOTIDINE 20 MG: 10 INJECTION, SOLUTION INTRAVENOUS at 09:21

## 2021-01-01 RX ADMIN — SODIUM CHLORIDE, PRESERVATIVE FREE 10 ML: 5 INJECTION INTRAVENOUS at 21:41

## 2021-01-01 RX ADMIN — ALBUTEROL SULFATE 2 PUFF: 90 AEROSOL, METERED RESPIRATORY (INHALATION) at 22:22

## 2021-01-01 RX ADMIN — ATORVASTATIN CALCIUM 80 MG: 80 TABLET, FILM COATED ORAL at 21:41

## 2021-01-01 RX ADMIN — PIPERACILLIN AND TAZOBACTAM 3375 MG: 3; .375 INJECTION, POWDER, FOR SOLUTION INTRAVENOUS at 15:29

## 2021-01-01 RX ADMIN — IPRATROPIUM BROMIDE 4 PUFF: 17 AEROSOL, METERED RESPIRATORY (INHALATION) at 23:46

## 2021-01-01 RX ADMIN — Medication: at 19:19

## 2021-01-01 RX ADMIN — INSULIN GLARGINE 10 UNITS: 100 INJECTION, SOLUTION SUBCUTANEOUS at 21:12

## 2021-01-01 RX ADMIN — PIPERACILLIN AND TAZOBACTAM 3375 MG: 3; .375 INJECTION, POWDER, LYOPHILIZED, FOR SOLUTION INTRAVENOUS at 02:48

## 2021-01-01 RX ADMIN — ASPIRIN 300 MG: 300 SUPPOSITORY RECTAL at 02:11

## 2021-01-01 RX ADMIN — ALBUTEROL SULFATE 2 PUFF: 90 AEROSOL, METERED RESPIRATORY (INHALATION) at 19:09

## 2021-01-01 RX ADMIN — SODIUM CHLORIDE, PRESERVATIVE FREE 10 ML: 5 INJECTION INTRAVENOUS at 21:56

## 2021-01-01 RX ADMIN — ZINC SULFATE 220 MG (50 MG) CAPSULE 50 MG: CAPSULE at 09:21

## 2021-01-01 RX ADMIN — FAMOTIDINE 20 MG: 20 TABLET, FILM COATED ORAL at 10:00

## 2021-01-01 RX ADMIN — ENOXAPARIN SODIUM 30 MG: 30 INJECTION SUBCUTANEOUS at 08:40

## 2021-01-01 RX ADMIN — OXYCODONE HYDROCHLORIDE AND ACETAMINOPHEN 1000 MG: 500 TABLET ORAL at 08:29

## 2021-01-01 RX ADMIN — Medication: at 16:28

## 2021-01-01 RX ADMIN — ACETAMINOPHEN 650 MG: 650 SUPPOSITORY RECTAL at 03:56

## 2021-01-01 RX ADMIN — ALBUTEROL SULFATE 4 PUFF: 90 AEROSOL, METERED RESPIRATORY (INHALATION) at 23:45

## 2021-01-01 RX ADMIN — BARICITINIB 4 MG: 2 TABLET, FILM COATED ORAL at 11:05

## 2021-01-01 RX ADMIN — OXYCODONE HYDROCHLORIDE AND ACETAMINOPHEN 1000 MG: 500 TABLET ORAL at 08:44

## 2021-01-01 RX ADMIN — Medication 50 MCG/HR: at 20:11

## 2021-01-01 RX ADMIN — DEXAMETHASONE SODIUM PHOSPHATE 10 MG: 10 INJECTION INTRAMUSCULAR; INTRAVENOUS at 10:00

## 2021-01-01 RX ADMIN — ENOXAPARIN SODIUM 30 MG: 30 INJECTION SUBCUTANEOUS at 21:53

## 2021-01-01 RX ADMIN — ATORVASTATIN CALCIUM 80 MG: 80 TABLET, FILM COATED ORAL at 20:25

## 2021-01-01 RX ADMIN — IPRATROPIUM BROMIDE 4 PUFF: 17 AEROSOL, METERED RESPIRATORY (INHALATION) at 04:13

## 2021-01-01 RX ADMIN — PIPERACILLIN AND TAZOBACTAM 3375 MG: 3; .375 INJECTION, POWDER, LYOPHILIZED, FOR SOLUTION INTRAVENOUS at 01:17

## 2021-01-01 RX ADMIN — PIPERACILLIN AND TAZOBACTAM 3375 MG: 3; .375 INJECTION, POWDER, FOR SOLUTION INTRAVENOUS at 05:56

## 2021-01-01 RX ADMIN — PROPOFOL 20 MCG/KG/MIN: 10 INJECTION, EMULSION INTRAVENOUS at 10:28

## 2021-01-01 RX ADMIN — SODIUM CHLORIDE, PRESERVATIVE FREE 10 ML: 5 INJECTION INTRAVENOUS at 09:22

## 2021-01-01 RX ADMIN — SODIUM CHLORIDE 1000 ML: 9 INJECTION, SOLUTION INTRAVENOUS at 05:51

## 2021-01-01 RX ADMIN — PROPOFOL 25 MCG/KG/MIN: 10 INJECTION, EMULSION INTRAVENOUS at 02:42

## 2021-01-01 RX ADMIN — ENOXAPARIN SODIUM 30 MG: 30 INJECTION SUBCUTANEOUS at 21:45

## 2021-01-01 RX ADMIN — CEFTRIAXONE SODIUM 1000 MG: 1 INJECTION, POWDER, FOR SOLUTION INTRAMUSCULAR; INTRAVENOUS at 08:49

## 2021-01-01 RX ADMIN — ALBUTEROL SULFATE 4 PUFF: 90 AEROSOL, METERED RESPIRATORY (INHALATION) at 15:44

## 2021-01-01 RX ADMIN — IPRATROPIUM BROMIDE 4 PUFF: 17 AEROSOL, METERED RESPIRATORY (INHALATION) at 11:21

## 2021-01-01 RX ADMIN — ENOXAPARIN SODIUM 30 MG: 30 INJECTION SUBCUTANEOUS at 08:46

## 2021-01-01 RX ADMIN — CEFTRIAXONE SODIUM 1000 MG: 1 INJECTION, POWDER, FOR SOLUTION INTRAMUSCULAR; INTRAVENOUS at 09:50

## 2021-01-01 RX ADMIN — DILTIAZEM HYDROCHLORIDE 5 MG/HR: 5 INJECTION INTRAVENOUS at 11:30

## 2021-01-01 RX ADMIN — ENOXAPARIN SODIUM 30 MG: 30 INJECTION SUBCUTANEOUS at 09:48

## 2021-01-01 RX ADMIN — VANCOMYCIN 1250 MG: 1.75 INJECTION, SOLUTION INTRAVENOUS at 21:46

## 2021-01-01 RX ADMIN — SODIUM CHLORIDE, PRESERVATIVE FREE 10 ML: 5 INJECTION INTRAVENOUS at 21:59

## 2021-01-01 RX ADMIN — Medication 3 MG: at 20:17

## 2021-01-01 RX ADMIN — ALBUMIN, HUMAN INJ 5% 25 G: 5 SOLUTION at 20:22

## 2021-01-01 RX ADMIN — ALBUTEROL SULFATE 4 PUFF: 90 AEROSOL, METERED RESPIRATORY (INHALATION) at 15:39

## 2021-01-01 RX ADMIN — LORAZEPAM 1 MG: 2 INJECTION INTRAMUSCULAR; INTRAVENOUS at 16:58

## 2021-01-01 RX ADMIN — ALBUTEROL SULFATE 2 PUFF: 90 AEROSOL, METERED RESPIRATORY (INHALATION) at 08:47

## 2021-01-01 RX ADMIN — ALBUTEROL SULFATE 4 PUFF: 90 AEROSOL, METERED RESPIRATORY (INHALATION) at 07:17

## 2021-01-01 RX ADMIN — LORAZEPAM 2 MG: 2 INJECTION INTRAMUSCULAR; INTRAVENOUS at 10:36

## 2021-01-01 RX ADMIN — PROPOFOL 15 MCG/KG/MIN: 10 INJECTION, EMULSION INTRAVENOUS at 14:54

## 2021-01-01 RX ADMIN — Medication 3 MG: at 21:59

## 2021-01-01 RX ADMIN — CALCIUM GLUCONATE 1000 MG: 20 INJECTION, SOLUTION INTRAVENOUS at 01:26

## 2021-01-01 RX ADMIN — ACETAMINOPHEN 650 MG: 325 TABLET ORAL at 21:25

## 2021-01-01 RX ADMIN — Medication 50 MCG/HR: at 15:03

## 2021-01-01 RX ADMIN — ALBUTEROL SULFATE 4 PUFF: 90 AEROSOL, METERED RESPIRATORY (INHALATION) at 08:00

## 2021-01-01 RX ADMIN — SODIUM CHLORIDE, PRESERVATIVE FREE 10 ML: 5 INJECTION INTRAVENOUS at 09:49

## 2021-01-01 RX ADMIN — LORAZEPAM 1 MG: 2 INJECTION INTRAMUSCULAR; INTRAVENOUS at 09:51

## 2021-01-01 RX ADMIN — LORAZEPAM 2 MG: 2 INJECTION INTRAMUSCULAR; INTRAVENOUS at 01:50

## 2021-01-01 RX ADMIN — BARICITINIB 4 MG: 2 TABLET, FILM COATED ORAL at 08:39

## 2021-01-01 RX ADMIN — DEXAMETHASONE SODIUM PHOSPHATE 10 MG: 10 INJECTION INTRAMUSCULAR; INTRAVENOUS at 09:57

## 2021-01-01 RX ADMIN — PIPERACILLIN AND TAZOBACTAM 3375 MG: 3; .375 INJECTION, POWDER, FOR SOLUTION INTRAVENOUS at 05:28

## 2021-01-01 RX ADMIN — PROPOFOL 25 MCG/KG/MIN: 10 INJECTION, EMULSION INTRAVENOUS at 09:44

## 2021-01-01 RX ADMIN — ALBUTEROL SULFATE 4 PUFF: 90 AEROSOL, METERED RESPIRATORY (INHALATION) at 04:25

## 2021-01-01 RX ADMIN — ALBUTEROL SULFATE 4 PUFF: 90 AEROSOL, METERED RESPIRATORY (INHALATION) at 19:57

## 2021-01-01 RX ADMIN — ALBUTEROL SULFATE 4 PUFF: 90 AEROSOL, METERED RESPIRATORY (INHALATION) at 19:40

## 2021-01-01 RX ADMIN — VANCOMYCIN 1500 MG: 1.5 INJECTION, SOLUTION INTRAVENOUS at 15:23

## 2021-01-01 RX ADMIN — INSULIN GLARGINE 10 UNITS: 100 INJECTION, SOLUTION SUBCUTANEOUS at 21:08

## 2021-01-01 RX ADMIN — LORAZEPAM 2 MG: 2 INJECTION INTRAMUSCULAR; INTRAVENOUS at 14:10

## 2021-01-01 RX ADMIN — SODIUM CHLORIDE, PRESERVATIVE FREE 10 ML: 5 INJECTION INTRAVENOUS at 09:07

## 2021-01-01 RX ADMIN — DEXAMETHASONE SODIUM PHOSPHATE 10 MG: 10 INJECTION INTRAMUSCULAR; INTRAVENOUS at 08:40

## 2021-01-01 RX ADMIN — IPRATROPIUM BROMIDE 2 PUFF: 17 AEROSOL, METERED RESPIRATORY (INHALATION) at 07:46

## 2021-01-01 RX ADMIN — IPRATROPIUM BROMIDE 4 PUFF: 17 AEROSOL, METERED RESPIRATORY (INHALATION) at 12:28

## 2021-01-01 RX ADMIN — FAMOTIDINE 20 MG: 10 INJECTION, SOLUTION INTRAVENOUS at 09:57

## 2021-01-01 RX ADMIN — SODIUM CHLORIDE, PRESERVATIVE FREE 10 ML: 5 INJECTION INTRAVENOUS at 22:13

## 2021-01-01 RX ADMIN — ETOMIDATE 20 MG: 2 INJECTION, SOLUTION INTRAVENOUS at 02:54

## 2021-01-01 RX ADMIN — MIDAZOLAM 5 MG: 1 INJECTION INTRAMUSCULAR; INTRAVENOUS at 02:53

## 2021-01-01 RX ADMIN — ALBUTEROL SULFATE 2 PUFF: 90 AEROSOL, METERED RESPIRATORY (INHALATION) at 08:22

## 2021-01-01 RX ADMIN — ENOXAPARIN SODIUM 30 MG: 30 INJECTION SUBCUTANEOUS at 08:05

## 2021-01-01 RX ADMIN — DEXTROSE MONOHYDRATE 25 G: 25 INJECTION, SOLUTION INTRAVENOUS at 08:35

## 2021-01-01 RX ADMIN — ENOXAPARIN SODIUM 30 MG: 30 INJECTION SUBCUTANEOUS at 21:59

## 2021-01-01 RX ADMIN — ALBUTEROL SULFATE 4 PUFF: 90 AEROSOL, METERED RESPIRATORY (INHALATION) at 00:34

## 2021-01-01 RX ADMIN — ALBUTEROL SULFATE 10 MG: 2.5 SOLUTION RESPIRATORY (INHALATION) at 07:19

## 2021-01-01 RX ADMIN — FAMOTIDINE 20 MG: 20 TABLET, FILM COATED ORAL at 20:18

## 2021-01-01 RX ADMIN — DEXAMETHASONE SODIUM PHOSPHATE 10 MG: 10 INJECTION INTRAMUSCULAR; INTRAVENOUS at 10:12

## 2021-01-01 RX ADMIN — SODIUM ZIRCONIUM CYCLOSILICATE 10 G: 10 POWDER, FOR SUSPENSION ORAL at 16:59

## 2021-01-01 RX ADMIN — ENOXAPARIN SODIUM 30 MG: 30 INJECTION SUBCUTANEOUS at 21:09

## 2021-01-01 RX ADMIN — ALBUTEROL SULFATE 4 PUFF: 90 AEROSOL, METERED RESPIRATORY (INHALATION) at 20:15

## 2021-01-01 RX ADMIN — ALBUTEROL SULFATE 2 PUFF: 90 AEROSOL, METERED RESPIRATORY (INHALATION) at 20:05

## 2021-01-01 RX ADMIN — CALCIUM GLUCONATE 2000 MG: 20 INJECTION, SOLUTION INTRAVENOUS at 09:33

## 2021-01-01 RX ADMIN — ENOXAPARIN SODIUM 30 MG: 30 INJECTION SUBCUTANEOUS at 11:42

## 2021-01-01 RX ADMIN — VASOPRESSIN 0.04 UNITS/MIN: 20 INJECTION INTRAVENOUS at 23:17

## 2021-01-01 RX ADMIN — ENOXAPARIN SODIUM 30 MG: 30 INJECTION SUBCUTANEOUS at 09:32

## 2021-01-01 RX ADMIN — DOXYCYCLINE HYCLATE 100 MG: 100 TABLET, COATED ORAL at 21:45

## 2021-01-01 RX ADMIN — Medication 7 MCG/MIN: at 02:44

## 2021-01-01 RX ADMIN — ACETAMINOPHEN 650 MG: 650 SUPPOSITORY RECTAL at 21:56

## 2021-01-01 RX ADMIN — SODIUM CHLORIDE, PRESERVATIVE FREE 10 ML: 5 INJECTION INTRAVENOUS at 10:12

## 2021-01-01 RX ADMIN — IPRATROPIUM BROMIDE 2 PUFF: 17 AEROSOL, METERED RESPIRATORY (INHALATION) at 11:38

## 2021-01-01 RX ADMIN — SODIUM CHLORIDE, PRESERVATIVE FREE 10 ML: 5 INJECTION INTRAVENOUS at 08:29

## 2021-01-01 RX ADMIN — DEXAMETHASONE SODIUM PHOSPHATE 10 MG: 10 INJECTION INTRAMUSCULAR; INTRAVENOUS at 09:32

## 2021-01-01 RX ADMIN — FENTANYL CITRATE 50 MCG: 50 INJECTION, SOLUTION INTRAMUSCULAR; INTRAVENOUS at 03:41

## 2021-01-01 RX ADMIN — Medication: at 07:55

## 2021-01-01 RX ADMIN — SODIUM CHLORIDE 25 ML: 9 INJECTION, SOLUTION INTRAVENOUS at 09:57

## 2021-01-01 RX ADMIN — ALBUTEROL SULFATE 2 PUFF: 90 AEROSOL, METERED RESPIRATORY (INHALATION) at 08:08

## 2021-01-01 RX ADMIN — IPRATROPIUM BROMIDE 4 PUFF: 17 AEROSOL, METERED RESPIRATORY (INHALATION) at 04:10

## 2021-01-01 RX ADMIN — IPRATROPIUM BROMIDE 4 PUFF: 17 AEROSOL, METERED RESPIRATORY (INHALATION) at 15:39

## 2021-01-01 RX ADMIN — IPRATROPIUM BROMIDE 4 PUFF: 17 AEROSOL, METERED RESPIRATORY (INHALATION) at 11:46

## 2021-01-01 RX ADMIN — FAMOTIDINE 20 MG: 10 INJECTION, SOLUTION INTRAVENOUS at 09:32

## 2021-01-01 RX ADMIN — ALBUTEROL SULFATE 2 PUFF: 90 AEROSOL, METERED RESPIRATORY (INHALATION) at 12:34

## 2021-01-01 RX ADMIN — CALCIUM GLUCONATE 1000 MG: 20 INJECTION, SOLUTION INTRAVENOUS at 14:32

## 2021-01-01 RX ADMIN — PIPERACILLIN AND TAZOBACTAM 3375 MG: 3; .375 INJECTION, POWDER, FOR SOLUTION INTRAVENOUS at 22:27

## 2021-01-01 RX ADMIN — ALBUTEROL SULFATE 4 PUFF: 90 AEROSOL, METERED RESPIRATORY (INHALATION) at 11:45

## 2021-01-01 RX ADMIN — ALBUMIN (HUMAN) 25 G: 0.25 INJECTION, SOLUTION INTRAVENOUS at 15:17

## 2021-01-01 RX ADMIN — SODIUM BICARBONATE: 84 INJECTION, SOLUTION INTRAVENOUS at 16:09

## 2021-01-01 RX ADMIN — IPRATROPIUM BROMIDE 4 PUFF: 17 AEROSOL, METERED RESPIRATORY (INHALATION) at 08:02

## 2021-01-01 RX ADMIN — SODIUM CHLORIDE, PRESERVATIVE FREE 10 ML: 5 INJECTION INTRAVENOUS at 21:18

## 2021-01-01 RX ADMIN — LORAZEPAM 1 MG: 2 INJECTION INTRAMUSCULAR; INTRAVENOUS at 06:08

## 2021-01-01 RX ADMIN — FAMOTIDINE 20 MG: 20 TABLET, FILM COATED ORAL at 21:59

## 2021-01-01 RX ADMIN — INSULIN HUMAN 10 UNITS: 100 INJECTION, SOLUTION PARENTERAL at 14:33

## 2021-01-01 RX ADMIN — FAMOTIDINE 20 MG: 10 INJECTION, SOLUTION INTRAVENOUS at 22:53

## 2021-01-01 RX ADMIN — ALBUTEROL SULFATE 4 PUFF: 90 AEROSOL, METERED RESPIRATORY (INHALATION) at 11:44

## 2021-01-01 RX ADMIN — INSULIN GLARGINE 10 UNITS: 100 INJECTION, SOLUTION SUBCUTANEOUS at 21:54

## 2021-01-01 RX ADMIN — PANTOPRAZOLE SODIUM 40 MG: 40 INJECTION, POWDER, FOR SOLUTION INTRAVENOUS at 21:35

## 2021-01-01 RX ADMIN — Medication: at 16:03

## 2021-01-01 RX ADMIN — ENOXAPARIN SODIUM 30 MG: 30 INJECTION SUBCUTANEOUS at 10:00

## 2021-01-01 RX ADMIN — Medication: at 12:47

## 2021-01-01 RX ADMIN — Medication 50 MCG/HR: at 09:15

## 2021-01-01 RX ADMIN — ALBUTEROL SULFATE 4 PUFF: 90 AEROSOL, METERED RESPIRATORY (INHALATION) at 07:43

## 2021-01-01 RX ADMIN — ATORVASTATIN CALCIUM 80 MG: 80 TABLET, FILM COATED ORAL at 21:09

## 2021-01-01 RX ADMIN — DEXAMETHASONE SODIUM PHOSPHATE 10 MG: 10 INJECTION INTRAMUSCULAR; INTRAVENOUS at 08:45

## 2021-01-01 RX ADMIN — SODIUM BICARBONATE: 84 INJECTION, SOLUTION INTRAVENOUS at 03:04

## 2021-01-01 RX ADMIN — ALBUTEROL SULFATE 2 PUFF: 90 AEROSOL, METERED RESPIRATORY (INHALATION) at 11:39

## 2021-01-01 RX ADMIN — ALBUTEROL SULFATE 2 PUFF: 90 AEROSOL, METERED RESPIRATORY (INHALATION) at 07:47

## 2021-01-01 RX ADMIN — THERA TABS 1 TABLET: TAB at 09:30

## 2021-01-01 RX ADMIN — PIPERACILLIN AND TAZOBACTAM 3375 MG: 3; .375 INJECTION, POWDER, FOR SOLUTION INTRAVENOUS at 12:58

## 2021-01-01 RX ADMIN — BARICITINIB 1 MG: 2 TABLET, FILM COATED ORAL at 09:32

## 2021-01-01 RX ADMIN — HEPARIN SODIUM 2800 UNITS: 1000 INJECTION INTRAVENOUS; SUBCUTANEOUS at 21:59

## 2021-01-01 RX ADMIN — SODIUM CHLORIDE, PRESERVATIVE FREE 10 ML: 5 INJECTION INTRAVENOUS at 21:45

## 2021-01-01 RX ADMIN — KETOROLAC TROMETHAMINE 15 MG: 30 INJECTION, SOLUTION INTRAMUSCULAR; INTRAVENOUS at 07:11

## 2021-01-01 RX ADMIN — DEXAMETHASONE SODIUM PHOSPHATE 8 MG: 10 INJECTION, SOLUTION INTRAMUSCULAR; INTRAVENOUS at 07:10

## 2021-01-01 RX ADMIN — PROPOFOL 25 MCG/KG/MIN: 10 INJECTION, EMULSION INTRAVENOUS at 07:57

## 2021-01-01 RX ADMIN — SODIUM BICARBONATE: 84 INJECTION, SOLUTION INTRAVENOUS at 04:57

## 2021-01-01 RX ADMIN — SODIUM CHLORIDE 25 ML: 9 INJECTION, SOLUTION INTRAVENOUS at 22:11

## 2021-01-01 RX ADMIN — GUAIFENESIN AND DEXTROMETHORPHAN 5 ML: 100; 10 SYRUP ORAL at 10:00

## 2021-01-01 RX ADMIN — LORAZEPAM 2 MG: 2 INJECTION INTRAMUSCULAR; INTRAVENOUS at 10:41

## 2021-01-01 RX ADMIN — ALBUTEROL SULFATE 4 PUFF: 90 AEROSOL, METERED RESPIRATORY (INHALATION) at 23:06

## 2021-01-01 RX ADMIN — IPRATROPIUM BROMIDE 4 PUFF: 17 AEROSOL, METERED RESPIRATORY (INHALATION) at 16:17

## 2021-01-01 RX ADMIN — FAMOTIDINE 20 MG: 20 TABLET, FILM COATED ORAL at 21:45

## 2021-01-01 RX ADMIN — GUAIFENESIN AND CODEINE PHOSPHATE 10 ML: 100; 10 SOLUTION ORAL at 07:10

## 2021-01-01 RX ADMIN — VANCOMYCIN 2000 MG: 2 INJECTION, SOLUTION INTRAVENOUS at 11:32

## 2021-01-01 RX ADMIN — ZINC SULFATE 220 MG (50 MG) CAPSULE 50 MG: CAPSULE at 09:48

## 2021-01-01 RX ADMIN — ALBUTEROL SULFATE 4 PUFF: 90 AEROSOL, METERED RESPIRATORY (INHALATION) at 07:27

## 2021-01-01 RX ADMIN — PROPOFOL 15 MCG/KG/MIN: 10 INJECTION, EMULSION INTRAVENOUS at 18:42

## 2021-01-01 RX ADMIN — Medication 2 MCG/MIN: at 11:35

## 2021-01-01 RX ADMIN — IPRATROPIUM BROMIDE 4 PUFF: 17 AEROSOL, METERED RESPIRATORY (INHALATION) at 15:41

## 2021-01-01 RX ADMIN — LORAZEPAM 2 MG: 2 INJECTION INTRAMUSCULAR at 10:36

## 2021-01-01 RX ADMIN — PANTOPRAZOLE SODIUM 40 MG: 40 INJECTION, POWDER, FOR SOLUTION INTRAVENOUS at 22:06

## 2021-01-01 RX ADMIN — LORAZEPAM 2 MG: 2 INJECTION INTRAMUSCULAR; INTRAVENOUS at 04:36

## 2021-01-01 RX ADMIN — ALBUTEROL SULFATE 2 PUFF: 90 AEROSOL, METERED RESPIRATORY (INHALATION) at 07:45

## 2021-01-01 RX ADMIN — ZINC SULFATE 220 MG (50 MG) CAPSULE 50 MG: CAPSULE at 10:00

## 2021-01-01 RX ADMIN — FAMOTIDINE 20 MG: 10 INJECTION, SOLUTION INTRAVENOUS at 21:41

## 2021-01-01 RX ADMIN — ENOXAPARIN SODIUM 30 MG: 30 INJECTION SUBCUTANEOUS at 09:05

## 2021-01-01 RX ADMIN — CEFTRIAXONE SODIUM 1000 MG: 1 INJECTION, POWDER, FOR SOLUTION INTRAMUSCULAR; INTRAVENOUS at 09:59

## 2021-01-01 RX ADMIN — ASPIRIN 300 MG: 300 SUPPOSITORY RECTAL at 09:21

## 2021-01-01 RX ADMIN — IPRATROPIUM BROMIDE 4 PUFF: 17 AEROSOL, METERED RESPIRATORY (INHALATION) at 20:14

## 2021-01-01 RX ADMIN — ENOXAPARIN SODIUM 30 MG: 30 INJECTION SUBCUTANEOUS at 22:12

## 2021-01-01 RX ADMIN — Medication 2 MCG/MIN: at 04:29

## 2021-01-01 RX ADMIN — MINERAL OIL AND WHITE PETROLATUM: 150; 830 OINTMENT OPHTHALMIC at 16:48

## 2021-01-01 RX ADMIN — ENOXAPARIN SODIUM 30 MG: 30 INJECTION SUBCUTANEOUS at 21:06

## 2021-01-01 RX ADMIN — IPRATROPIUM BROMIDE 4 PUFF: 17 AEROSOL, METERED RESPIRATORY (INHALATION) at 12:12

## 2021-01-01 RX ADMIN — CALCIUM GLUCONATE 1000 MG: 20 INJECTION, SOLUTION INTRAVENOUS at 17:43

## 2021-01-01 RX ADMIN — FAMOTIDINE 20 MG: 10 INJECTION, SOLUTION INTRAVENOUS at 10:12

## 2021-01-01 RX ADMIN — IPRATROPIUM BROMIDE 4 PUFF: 17 AEROSOL, METERED RESPIRATORY (INHALATION) at 04:26

## 2021-01-01 RX ADMIN — ALBUTEROL SULFATE 2 PUFF: 90 AEROSOL, METERED RESPIRATORY (INHALATION) at 20:34

## 2021-01-01 RX ADMIN — PIPERACILLIN AND TAZOBACTAM 4500 MG: 4; .5 INJECTION, POWDER, LYOPHILIZED, FOR SOLUTION INTRAVENOUS at 22:12

## 2021-01-01 RX ADMIN — SODIUM CHLORIDE, PRESERVATIVE FREE 10 ML: 5 INJECTION INTRAVENOUS at 11:42

## 2021-01-01 RX ADMIN — SODIUM BICARBONATE: 84 INJECTION, SOLUTION INTRAVENOUS at 16:54

## 2021-01-01 RX ADMIN — GUAIFENESIN AND DEXTROMETHORPHAN 5 ML: 100; 10 SYRUP ORAL at 03:39

## 2021-01-01 RX ADMIN — FAMOTIDINE 20 MG: 10 INJECTION, SOLUTION INTRAVENOUS at 22:12

## 2021-01-01 RX ADMIN — FAMOTIDINE 20 MG: 10 INJECTION, SOLUTION INTRAVENOUS at 21:55

## 2021-01-01 RX ADMIN — Medication: at 16:27

## 2021-01-01 RX ADMIN — FENTANYL CITRATE 100 MCG: 0.05 INJECTION, SOLUTION INTRAMUSCULAR; INTRAVENOUS at 02:54

## 2021-01-01 RX ADMIN — ALBUTEROL SULFATE 4 PUFF: 90 AEROSOL, METERED RESPIRATORY (INHALATION) at 04:09

## 2021-01-01 RX ADMIN — PIPERACILLIN AND TAZOBACTAM 3375 MG: 3; .375 INJECTION, POWDER, FOR SOLUTION INTRAVENOUS at 14:06

## 2021-01-01 RX ADMIN — SODIUM CHLORIDE 1000 ML: 9 INJECTION, SOLUTION INTRAVENOUS at 07:11

## 2021-01-01 RX ADMIN — PROPOFOL 25 MCG/KG/MIN: 10 INJECTION, EMULSION INTRAVENOUS at 20:45

## 2021-01-01 RX ADMIN — THERA TABS 1 TABLET: TAB at 09:32

## 2021-01-01 RX ADMIN — ENOXAPARIN SODIUM 30 MG: 30 INJECTION SUBCUTANEOUS at 11:05

## 2021-01-01 RX ADMIN — SODIUM ZIRCONIUM CYCLOSILICATE 10 G: 10 POWDER, FOR SUSPENSION ORAL at 16:02

## 2021-01-01 RX ADMIN — SODIUM CHLORIDE: 9 INJECTION, SOLUTION INTRAVENOUS at 06:09

## 2021-01-01 RX ADMIN — PIPERACILLIN AND TAZOBACTAM 3375 MG: 3; .375 INJECTION, POWDER, FOR SOLUTION INTRAVENOUS at 22:13

## 2021-01-01 RX ADMIN — PROPOFOL 25 MCG/KG/MIN: 10 INJECTION, EMULSION INTRAVENOUS at 15:12

## 2021-01-01 RX ADMIN — Medication: at 12:48

## 2021-01-01 RX ADMIN — Medication 50 MEQ: at 08:36

## 2021-01-01 RX ADMIN — PIPERACILLIN AND TAZOBACTAM 3375 MG: 3; .375 INJECTION, POWDER, LYOPHILIZED, FOR SOLUTION INTRAVENOUS at 14:19

## 2021-01-01 RX ADMIN — ALBUMIN (HUMAN) 25 G: 12.5 INJECTION, SOLUTION INTRAVENOUS at 20:22

## 2021-01-01 RX ADMIN — IPRATROPIUM BROMIDE 4 PUFF: 17 AEROSOL, METERED RESPIRATORY (INHALATION) at 19:40

## 2021-01-01 RX ADMIN — FAMOTIDINE 20 MG: 10 INJECTION, SOLUTION INTRAVENOUS at 09:03

## 2021-01-01 RX ADMIN — OXYCODONE HYDROCHLORIDE AND ACETAMINOPHEN 1000 MG: 500 TABLET ORAL at 11:05

## 2021-01-01 RX ADMIN — PIPERACILLIN AND TAZOBACTAM 3375 MG: 3; .375 INJECTION, POWDER, FOR SOLUTION INTRAVENOUS at 16:09

## 2021-01-01 RX ADMIN — SODIUM ZIRCONIUM CYCLOSILICATE 10 G: 10 POWDER, FOR SUSPENSION ORAL at 16:37

## 2021-01-01 RX ADMIN — BARICITINIB 4 MG: 2 TABLET, FILM COATED ORAL at 10:00

## 2021-01-01 RX ADMIN — DIPHENHYDRAMINE HYDROCHLORIDE 25 MG: 25 TABLET ORAL at 16:48

## 2021-01-01 RX ADMIN — SODIUM CHLORIDE, PRESERVATIVE FREE 10 ML: 5 INJECTION INTRAVENOUS at 10:00

## 2021-01-01 RX ADMIN — FAMOTIDINE 20 MG: 20 TABLET, FILM COATED ORAL at 08:45

## 2021-01-01 RX ADMIN — ALBUTEROL SULFATE 4 PUFF: 90 AEROSOL, METERED RESPIRATORY (INHALATION) at 16:17

## 2021-01-01 RX ADMIN — FAMOTIDINE 20 MG: 20 TABLET, FILM COATED ORAL at 09:48

## 2021-01-01 RX ADMIN — FAMOTIDINE 20 MG: 10 INJECTION, SOLUTION INTRAVENOUS at 21:18

## 2021-01-01 RX ADMIN — THERA TABS 1 TABLET: TAB at 08:29

## 2021-01-01 RX ADMIN — ALBUTEROL SULFATE 2 PUFF: 90 AEROSOL, METERED RESPIRATORY (INHALATION) at 16:05

## 2021-01-01 RX ADMIN — FAMOTIDINE 20 MG: 20 TABLET, FILM COATED ORAL at 08:39

## 2021-01-01 RX ADMIN — SODIUM CHLORIDE, PRESERVATIVE FREE 10 ML: 5 INJECTION INTRAVENOUS at 20:15

## 2021-01-01 RX ADMIN — PROPOFOL 30 MCG/KG/MIN: 10 INJECTION, EMULSION INTRAVENOUS at 01:13

## 2021-01-01 RX ADMIN — ALBUTEROL SULFATE 4 PUFF: 90 AEROSOL, METERED RESPIRATORY (INHALATION) at 15:40

## 2021-01-01 RX ADMIN — IPRATROPIUM BROMIDE 4 PUFF: 17 AEROSOL, METERED RESPIRATORY (INHALATION) at 07:17

## 2021-01-01 RX ADMIN — CEFTRIAXONE SODIUM 1000 MG: 1 INJECTION, POWDER, FOR SOLUTION INTRAMUSCULAR; INTRAVENOUS at 07:11

## 2021-01-01 RX ADMIN — SODIUM BICARBONATE: 84 INJECTION, SOLUTION INTRAVENOUS at 04:10

## 2021-01-01 RX ADMIN — ZINC SULFATE 220 MG (50 MG) CAPSULE 50 MG: CAPSULE at 08:45

## 2021-01-01 RX ADMIN — BARICITINIB 4 MG: 2 TABLET, FILM COATED ORAL at 09:47

## 2021-01-01 RX ADMIN — ALBUTEROL SULFATE 4 PUFF: 90 AEROSOL, METERED RESPIRATORY (INHALATION) at 22:58

## 2021-01-01 RX ADMIN — INSULIN GLARGINE 10 UNITS: 100 INJECTION, SOLUTION SUBCUTANEOUS at 22:14

## 2021-01-01 RX ADMIN — Medication 8 MCG/MIN: at 11:07

## 2021-01-01 RX ADMIN — PIPERACILLIN AND TAZOBACTAM 3375 MG: 3; .375 INJECTION, POWDER, FOR SOLUTION INTRAVENOUS at 13:15

## 2021-01-01 RX ADMIN — IPRATROPIUM BROMIDE 4 PUFF: 17 AEROSOL, METERED RESPIRATORY (INHALATION) at 11:45

## 2021-01-01 RX ADMIN — METOPROLOL TARTRATE 5 MG: 5 INJECTION INTRAVENOUS at 03:16

## 2021-01-01 RX ADMIN — ENOXAPARIN SODIUM 30 MG: 30 INJECTION SUBCUTANEOUS at 21:55

## 2021-01-01 RX ADMIN — INSULIN GLARGINE 10 UNITS: 100 INJECTION, SOLUTION SUBCUTANEOUS at 20:53

## 2021-01-01 RX ADMIN — ENOXAPARIN SODIUM 30 MG: 30 INJECTION SUBCUTANEOUS at 21:13

## 2021-01-01 RX ADMIN — Medication 50 MCG/HR: at 17:07

## 2021-01-01 RX ADMIN — PROPOFOL 10 MCG/KG/MIN: 10 INJECTION, EMULSION INTRAVENOUS at 02:45

## 2021-01-01 RX ADMIN — IPRATROPIUM BROMIDE 4 PUFF: 17 AEROSOL, METERED RESPIRATORY (INHALATION) at 22:59

## 2021-01-01 RX ADMIN — IPRATROPIUM BROMIDE 4 PUFF: 17 AEROSOL, METERED RESPIRATORY (INHALATION) at 19:58

## 2021-01-01 RX ADMIN — ZINC SULFATE 220 MG (50 MG) CAPSULE 50 MG: CAPSULE at 11:05

## 2021-01-01 RX ADMIN — ENOXAPARIN SODIUM 30 MG: 30 INJECTION SUBCUTANEOUS at 20:14

## 2021-01-01 RX ADMIN — Medication 87 MCG/MIN: at 22:24

## 2021-01-01 RX ADMIN — GUAIFENESIN AND DEXTROMETHORPHAN 5 ML: 100; 10 SYRUP ORAL at 21:54

## 2021-01-01 RX ADMIN — SODIUM CHLORIDE, PRESERVATIVE FREE 10 ML: 5 INJECTION INTRAVENOUS at 22:23

## 2021-01-01 RX ADMIN — DEXAMETHASONE SODIUM PHOSPHATE 10 MG: 10 INJECTION INTRAMUSCULAR; INTRAVENOUS at 08:05

## 2021-01-01 RX ADMIN — SODIUM CHLORIDE: 9 INJECTION, SOLUTION INTRAVENOUS at 18:22

## 2021-01-01 RX ADMIN — FAMOTIDINE 20 MG: 20 TABLET, FILM COATED ORAL at 21:11

## 2021-01-01 RX ADMIN — ALBUMIN (HUMAN) 25 G: 0.25 INJECTION, SOLUTION INTRAVENOUS at 01:59

## 2021-01-01 RX ADMIN — Medication 100 MG: at 11:05

## 2021-01-01 RX ADMIN — DILTIAZEM HYDROCHLORIDE 10 MG: 5 INJECTION INTRAVENOUS at 11:02

## 2021-01-01 RX ADMIN — ACETAMINOPHEN 650 MG: 650 SUPPOSITORY RECTAL at 00:15

## 2021-01-01 RX ADMIN — IPRATROPIUM BROMIDE 4 PUFF: 17 AEROSOL, METERED RESPIRATORY (INHALATION) at 23:07

## 2021-01-01 RX ADMIN — INSULIN HUMAN 10 UNITS: 100 INJECTION, SOLUTION PARENTERAL at 08:35

## 2021-01-01 RX ADMIN — ALBUTEROL SULFATE 2 PUFF: 90 AEROSOL, METERED RESPIRATORY (INHALATION) at 15:41

## 2021-01-01 RX ADMIN — PROPOFOL 25 MCG/KG/MIN: 10 INJECTION, EMULSION INTRAVENOUS at 14:30

## 2021-01-01 RX ADMIN — DOXYCYCLINE HYCLATE 100 MG: 100 TABLET, COATED ORAL at 08:39

## 2021-01-01 RX ADMIN — BARICITINIB 1 MG: 2 TABLET, FILM COATED ORAL at 08:54

## 2021-01-01 RX ADMIN — Medication 100 MG: at 08:54

## 2021-01-01 RX ADMIN — DEXAMETHASONE SODIUM PHOSPHATE 10 MG: 10 INJECTION INTRAMUSCULAR; INTRAVENOUS at 11:05

## 2021-01-01 RX ADMIN — IBUPROFEN 600 MG: 600 TABLET, FILM COATED ORAL at 16:48

## 2021-01-01 RX ADMIN — Medication 25 MCG/MIN: at 15:11

## 2021-01-01 RX ADMIN — ACETAMINOPHEN 650 MG: 325 TABLET ORAL at 08:54

## 2021-01-01 RX ADMIN — BARICITINIB 1 MG: 2 TABLET, FILM COATED ORAL at 09:21

## 2021-01-01 RX ADMIN — Medication 100 MG: at 09:32

## 2021-01-01 RX ADMIN — PIPERACILLIN AND TAZOBACTAM 3375 MG: 3; .375 INJECTION, POWDER, FOR SOLUTION INTRAVENOUS at 21:14

## 2021-01-01 RX ADMIN — Medication 100 MG: at 08:29

## 2021-01-01 RX ADMIN — INSULIN GLARGINE 10 UNITS: 100 INJECTION, SOLUTION SUBCUTANEOUS at 21:53

## 2021-01-01 RX ADMIN — Medication 100 MG: at 09:21

## 2021-01-01 RX ADMIN — ALBUTEROL SULFATE: 90 AEROSOL, METERED RESPIRATORY (INHALATION) at 08:21

## 2021-01-01 RX ADMIN — IPRATROPIUM BROMIDE 4 PUFF: 17 AEROSOL, METERED RESPIRATORY (INHALATION) at 15:43

## 2021-01-01 RX ADMIN — AZITHROMYCIN MONOHYDRATE 500 MG: 500 INJECTION, POWDER, LYOPHILIZED, FOR SOLUTION INTRAVENOUS at 08:30

## 2021-01-01 RX ADMIN — DOXYCYCLINE HYCLATE 100 MG: 100 TABLET, COATED ORAL at 21:12

## 2021-01-01 RX ADMIN — ZINC SULFATE 220 MG (50 MG) CAPSULE 50 MG: CAPSULE at 08:29

## 2021-01-01 RX ADMIN — ALBUTEROL SULFATE 4 PUFF: 90 AEROSOL, METERED RESPIRATORY (INHALATION) at 20:13

## 2021-01-01 RX ADMIN — PROPOFOL 25 MCG/KG/MIN: 10 INJECTION, EMULSION INTRAVENOUS at 20:52

## 2021-01-01 RX ADMIN — IPRATROPIUM BROMIDE 4 PUFF: 17 AEROSOL, METERED RESPIRATORY (INHALATION) at 07:27

## 2021-01-01 RX ADMIN — Medication: at 04:41

## 2021-01-01 RX ADMIN — OXYCODONE HYDROCHLORIDE AND ACETAMINOPHEN 1000 MG: 500 TABLET ORAL at 10:00

## 2021-01-01 RX ADMIN — Medication 50 MCG/HR: at 11:04

## 2021-01-01 RX ADMIN — ENOXAPARIN SODIUM 30 MG: 30 INJECTION SUBCUTANEOUS at 21:41

## 2021-01-01 RX ADMIN — SODIUM CHLORIDE, PRESERVATIVE FREE 10 ML: 5 INJECTION INTRAVENOUS at 22:14

## 2021-01-01 RX ADMIN — IPRATROPIUM BROMIDE 2 PUFF: 17 AEROSOL, METERED RESPIRATORY (INHALATION) at 11:39

## 2021-01-01 RX ADMIN — PROPOFOL 25 MCG/KG/MIN: 10 INJECTION, EMULSION INTRAVENOUS at 21:30

## 2021-01-01 RX ADMIN — OXYCODONE HYDROCHLORIDE AND ACETAMINOPHEN 1000 MG: 500 TABLET ORAL at 08:40

## 2021-01-01 RX ADMIN — ALBUTEROL SULFATE 2 PUFF: 90 AEROSOL, METERED RESPIRATORY (INHALATION) at 20:44

## 2021-01-01 RX ADMIN — PANTOPRAZOLE SODIUM 40 MG: 40 INJECTION, POWDER, FOR SOLUTION INTRAVENOUS at 08:29

## 2021-01-01 RX ADMIN — LORAZEPAM 1 MG: 2 INJECTION INTRAMUSCULAR; INTRAVENOUS at 12:46

## 2021-01-01 RX ADMIN — DEXAMETHASONE SODIUM PHOSPHATE 6 MG: 4 INJECTION, SOLUTION INTRAMUSCULAR; INTRAVENOUS at 09:47

## 2021-01-01 RX ADMIN — DOXYCYCLINE HYCLATE 100 MG: 100 TABLET, COATED ORAL at 20:18

## 2021-01-01 RX ADMIN — IPRATROPIUM BROMIDE 4 PUFF: 17 AEROSOL, METERED RESPIRATORY (INHALATION) at 07:43

## 2021-01-01 RX ADMIN — Medication 25 MCG/HR: at 02:51

## 2021-01-01 RX ADMIN — ALBUTEROL SULFATE 4 PUFF: 90 AEROSOL, METERED RESPIRATORY (INHALATION) at 04:12

## 2021-01-01 RX ADMIN — PIPERACILLIN AND TAZOBACTAM 4500 MG: 4; .5 INJECTION, POWDER, LYOPHILIZED, FOR SOLUTION INTRAVENOUS at 06:16

## 2021-01-01 RX ADMIN — SODIUM CHLORIDE, PRESERVATIVE FREE 10 ML: 5 INJECTION INTRAVENOUS at 21:25

## 2021-01-01 RX ADMIN — Medication: at 19:41

## 2021-01-01 RX ADMIN — SODIUM BICARBONATE: 84 INJECTION, SOLUTION INTRAVENOUS at 16:58

## 2021-01-01 RX ADMIN — ZINC SULFATE 220 MG (50 MG) CAPSULE 50 MG: CAPSULE at 08:39

## 2021-01-01 RX ADMIN — FAMOTIDINE 20 MG: 10 INJECTION, SOLUTION INTRAVENOUS at 20:14

## 2021-01-01 RX ADMIN — OXYCODONE HYDROCHLORIDE AND ACETAMINOPHEN 1000 MG: 500 TABLET ORAL at 09:32

## 2021-01-01 RX ADMIN — SODIUM CHLORIDE, PRESERVATIVE FREE 10 ML: 5 INJECTION INTRAVENOUS at 09:06

## 2021-01-01 RX ADMIN — SODIUM CHLORIDE, PRESERVATIVE FREE 10 ML: 5 INJECTION INTRAVENOUS at 00:08

## 2021-01-01 RX ADMIN — ALBUTEROL SULFATE 2 PUFF: 90 AEROSOL, METERED RESPIRATORY (INHALATION) at 07:00

## 2021-01-01 RX ADMIN — DILTIAZEM HYDROCHLORIDE 15 MG/HR: 5 INJECTION INTRAVENOUS at 19:34

## 2021-01-01 RX ADMIN — ZINC SULFATE 220 MG (50 MG) CAPSULE 50 MG: CAPSULE at 09:32

## 2021-01-01 RX ADMIN — SODIUM BICARBONATE: 84 INJECTION, SOLUTION INTRAVENOUS at 04:38

## 2021-01-01 RX ADMIN — FAMOTIDINE 20 MG: 10 INJECTION, SOLUTION INTRAVENOUS at 21:09

## 2021-01-01 RX ADMIN — PIPERACILLIN AND TAZOBACTAM 3375 MG: 3; .375 INJECTION, POWDER, LYOPHILIZED, FOR SOLUTION INTRAVENOUS at 15:10

## 2021-01-01 RX ADMIN — ALBUTEROL SULFATE 4 PUFF: 90 AEROSOL, METERED RESPIRATORY (INHALATION) at 11:20

## 2021-01-01 RX ADMIN — METHYLPREDNISOLONE ACETATE 80 MG: 40 INJECTION, SUSPENSION INTRA-ARTICULAR; INTRALESIONAL; INTRAMUSCULAR; SOFT TISSUE at 18:45

## 2021-01-01 RX ADMIN — ENOXAPARIN SODIUM 30 MG: 30 INJECTION SUBCUTANEOUS at 09:57

## 2021-01-01 RX ADMIN — OXYCODONE HYDROCHLORIDE AND ACETAMINOPHEN 1000 MG: 500 TABLET ORAL at 08:55

## 2021-01-01 RX ADMIN — HEPARIN SODIUM 5000 UNITS: 5000 INJECTION, SOLUTION INTRAVENOUS; SUBCUTANEOUS at 21:35

## 2021-01-01 RX ADMIN — CEFTRIAXONE SODIUM 1000 MG: 1 INJECTION, POWDER, FOR SOLUTION INTRAMUSCULAR; INTRAVENOUS at 08:45

## 2021-01-01 RX ADMIN — DOXYCYCLINE HYCLATE 100 MG: 100 TABLET, COATED ORAL at 09:48

## 2021-01-01 RX ADMIN — SODIUM CHLORIDE, PRESERVATIVE FREE 10 ML: 5 INJECTION INTRAVENOUS at 20:14

## 2021-01-01 RX ADMIN — MIDAZOLAM HYDROCHLORIDE 2 MG: 1 INJECTION, SOLUTION INTRAMUSCULAR; INTRAVENOUS at 03:03

## 2021-01-01 RX ADMIN — BARICITINIB 4 MG: 2 TABLET, FILM COATED ORAL at 08:45

## 2021-01-01 RX ADMIN — PROPOFOL 15 MCG/KG/MIN: 10 INJECTION, EMULSION INTRAVENOUS at 02:46

## 2021-01-01 ASSESSMENT — ENCOUNTER SYMPTOMS
SORE THROAT: 0
DIARRHEA: 1
RHINORRHEA: 1
EYES NEGATIVE: 1
CHEST TIGHTNESS: 1
ABDOMINAL PAIN: 0
COUGH: 1
NAUSEA: 1
VOMITING: 0
SHORTNESS OF BREATH: 1
WHEEZING: 1
CONSTIPATION: 0

## 2021-01-01 ASSESSMENT — PAIN SCALES - GENERAL
PAINLEVEL_OUTOF10: 5
PAINLEVEL_OUTOF10: 0
PAINLEVEL_OUTOF10: 5
PAINLEVEL_OUTOF10: 0

## 2021-01-01 ASSESSMENT — PULMONARY FUNCTION TESTS
PIF_VALUE: 37
PIF_VALUE: 31
PIF_VALUE: 27
PIF_VALUE: 31
PIF_VALUE: 27
PIF_VALUE: 35
PIF_VALUE: 26
PIF_VALUE: 27
PIF_VALUE: 27
PIF_VALUE: 36
PIF_VALUE: 35
PIF_VALUE: 36
PIF_VALUE: 35
PIF_VALUE: 36
PIF_VALUE: 36
PIF_VALUE: 25
PIF_VALUE: 36
PIF_VALUE: 24
PIF_VALUE: 37
PIF_VALUE: 36
PIF_VALUE: 34
PIF_VALUE: 36
PIF_VALUE: 37
PIF_VALUE: 27
PIF_VALUE: 35
PIF_VALUE: 30
PIF_VALUE: 36
PIF_VALUE: 25
PIF_VALUE: 36
PIF_VALUE: 34
PIF_VALUE: 35
PIF_VALUE: 29
PIF_VALUE: 36
PIF_VALUE: 33
PIF_VALUE: 28
PIF_VALUE: 36
PIF_VALUE: 26
PIF_VALUE: 27
PIF_VALUE: 36
PIF_VALUE: 37
PIF_VALUE: 34
PIF_VALUE: 35
PIF_VALUE: 28
PIF_VALUE: 36
PIF_VALUE: 33
PIF_VALUE: 27
PIF_VALUE: 26
PIF_VALUE: 29
PIF_VALUE: 24
PIF_VALUE: 36
PIF_VALUE: 34
PIF_VALUE: 36
PIF_VALUE: 35
PIF_VALUE: 35
PIF_VALUE: 36
PIF_VALUE: 34
PIF_VALUE: 35
PIF_VALUE: 28
PIF_VALUE: 34
PIF_VALUE: 28
PIF_VALUE: 37
PIF_VALUE: 36
PIF_VALUE: 37
PIF_VALUE: 36
PIF_VALUE: 25
PIF_VALUE: 36
PIF_VALUE: 24
PIF_VALUE: 36
PIF_VALUE: 27
PIF_VALUE: 27
PIF_VALUE: 36
PIF_VALUE: 28
PIF_VALUE: 36
PIF_VALUE: 36
PIF_VALUE: 29
PIF_VALUE: 36
PIF_VALUE: 29
PIF_VALUE: 36
PIF_VALUE: 37
PIF_VALUE: 36
PIF_VALUE: 28
PIF_VALUE: 26
PIF_VALUE: 36
PIF_VALUE: 27
PIF_VALUE: 36
PIF_VALUE: 37
PIF_VALUE: 36
PIF_VALUE: 36
PIF_VALUE: 25
PIF_VALUE: 36
PIF_VALUE: 27
PIF_VALUE: 27
PIF_VALUE: 29
PIF_VALUE: 35
PIF_VALUE: 37
PIF_VALUE: 36
PIF_VALUE: 36
PIF_VALUE: 35
PIF_VALUE: 27

## 2021-01-01 ASSESSMENT — LIFESTYLE VARIABLES
HOW MANY STANDARD DRINKS CONTAINING ALCOHOL DO YOU HAVE ON A TYPICAL DAY: PATIENT DECLINED
HOW OFTEN DO YOU HAVE A DRINK CONTAINING ALCOHOL: 4 OR MORE TIMES A WEEK

## 2021-01-01 ASSESSMENT — PAIN SCALES - WONG BAKER
WONGBAKER_NUMERICALRESPONSE: 0
WONGBAKER_NUMERICALRESPONSE: 0

## 2021-05-13 NOTE — PROGRESS NOTES
5/13/2021    Contrerase Son    Chief Complaint   Patient presents with    Gout     right foot       HPI  History was obtained from patient. Devi Lopez is a 72 y.o. male who presents today with concerns for gout flare-up of right foot. He states he first noticed pain in his right great toe this morning and it has progressed to the top of his foot. There is redness and swelling of the right great toe. He denies any injury to the foot. He has a history of gout and this feels similar. He denies nausea, vomiting, fever, chills. He has been eating more pork than usual and cut down on weekly alcohol intake. He would not quantify the alcohol. PAST MEDICAL HISTORY  Past Medical History:   Diagnosis Date    Hyperlipidemia     Hypertension        FAMILY HISTORY  History reviewed. No pertinent family history.     SOCIAL HISTORY  Social History     Socioeconomic History    Marital status:      Spouse name: None    Number of children: None    Years of education: None    Highest education level: None   Occupational History    None   Social Needs    Financial resource strain: None    Food insecurity     Worry: None     Inability: None    Transportation needs     Medical: None     Non-medical: None   Tobacco Use    Smoking status: Former Smoker     Packs/day: 1.00     Years: 30.00     Pack years: 30.00     Start date: 1991    Smokeless tobacco: Never Used   Substance and Sexual Activity    Alcohol use: Yes     Comment: occasional drinker    Drug use: No    Sexual activity: None   Lifestyle    Physical activity     Days per week: None     Minutes per session: None    Stress: None   Relationships    Social connections     Talks on phone: None     Gets together: None     Attends Alevism service: None     Active member of club or organization: None     Attends meetings of clubs or organizations: None     Relationship status: None    Intimate partner violence     Fear of current or ex partner: None Emotionally abused: None     Physically abused: None     Forced sexual activity: None   Other Topics Concern    None   Social History Narrative    None        SURGICAL HISTORY  Past Surgical History:   Procedure Laterality Date    APPENDECTOMY      COLONOSCOPY  3-25-13    polyp,s x 5    HERNIA REPAIR         CURRENT MEDICATIONS  Current Outpatient Medications   Medication Sig Dispense Refill    colchicine (COLCRYS) 0.6 MG tablet Take two tablets po now, take one tablet po an hour later 3 tablet 0    ibuprofen (ADVIL;MOTRIN) 600 MG tablet Take 600 mg by mouth every 6 hours as needed.  ATORVASTATIN CALCIUM PO Take 80 mg by mouth      propranolol (INDERAL) 80 MG tablet Take 80 mg by mouth 3 times daily      lisinopril (PRINIVIL;ZESTRIL) 10 MG tablet Take 10 mg by mouth daily      naproxen (NAPROSYN) 500 MG tablet Take 1 tablet by mouth 2 times daily as needed for Pain 20 tablet 0    aspirin 325 MG EC tablet Take 325 mg by mouth daily. Current Facility-Administered Medications   Medication Dose Route Frequency Provider Last Rate Last Admin    methylPREDNISolone acetate (DEPO-MEDROL) injection 80 mg  80 mg Intramuscular Once Adán Garcia PA-C           ALLERGIES  No Known Allergies    PHYSICAL EXAM    /80   Pulse 91   Temp 96.9 °F (36.1 °C) (Infrared)   Ht 6' 0.5\" (1.842 m)   Wt 252 lb 12.8 oz (114.7 kg)   SpO2 94%   BMI 33.81 kg/m²     Constitutional:  Well developed, well nourished  Cardiovascular:  Normal heart rate, normal rhythm, no murmurs, gallops or rubs  Thorax & Lungs:  Normal breath sounds, no respiratory distress, no wheezing  Skin/musculoskeletal: Exquisite tenderness to palpation right first MTP joint. There is erythema and edema overlying the joint. There is mild tenderness to palpation along the dorsal aspect of distal metatarsals. Full active and passive range of motion all foot/ankle/digits.   Neurologic:  Alert & oriented  Psychiatric:  Affect normal, mood normal ASSESSMENT & PLAN    Antoine Kerr was seen today for gout. Diagnoses and all orders for this visit:    Gout of right foot, unspecified cause, unspecified chronicity  -     colchicine (COLCRYS) 0.6 MG tablet; Take two tablets po now, take one tablet po an hour later  -     methylPREDNISolone acetate (DEPO-MEDROL) injection 80 mg    Patient was encouraged to rest the foot and ankle as much as practical. Apply intermittent application of ice to the affected area. Colchicine as directed. Patient was given a Depo-Medrol 80 mg injection in office today by Loretta Chauhan LPN. Patient tolerated the injection well. We discussed possible side effects of these medications in detail and patient voiced understanding and did wish to continue. Low purine diet discussed and encouraged. He was encouraged to follow-up with his primary care provider for routine health maintenance. Medications Discontinued During This Encounter   Medication Reason    methylPREDNISolone acetate (DEPO-MEDROL) injection 80 mg         No follow-ups on file. Plan of care reviewed with patient who verbalizes understanding and wishes to continue. Patient verbalizes understanding with the above plan and is in agreement. Patient will call with  worsening of symptoms, questions or concerns. Please note that this chart was generated using dragon dictation software. Although every effort was made to ensure the accuracy of this automated transcription, some errors in transcription may have occurred.     Electronically signed by Gayla Alamo PA-C on 5/13/2021

## 2021-05-13 NOTE — PATIENT INSTRUCTIONS
a list of the medicines you take. How can you care for yourself at home? · Plan your meals and snacks around foods that are low in purines and are safe for you to eat. These foods include:  ? Green vegetables and tomatoes. ? Fruits. ? Whole-grain breads, rice, and cereals. ? Eggs, peanut butter, and nuts. ? Low-fat milk, cheese, and other milk products. ? Popcorn. ? Gelatin desserts, chocolate, cocoa, and cakes and sweets, in small amounts. · You can eat certain foods that are medium-high in purines, but eat them only once in a while. These foods include:  ? Legumes, such as dried beans and dried peas. You can have 1 cup cooked legumes each day. ? Asparagus, cauliflower, spinach, mushrooms, and green peas. ? Fish and seafood (other than very high-purine seafood). ? Oatmeal, wheat bran, and wheat germ. · Limit very high-purine foods, including:  ? Organ meats, such as liver, kidneys, sweetbreads, and brains. ? Meats, including spaulding, beef, pork, and lamb. ? Game meats and any other meats in large amounts. ? Anchovies, sardines, herring, mackerel, and scallops. ? Gravy. ? Beer. Where can you learn more? Go to https://SpeechVive[employer]pic[employer] Bus Teeseb.H.BLOOM. org and sign in to your NaviExpert account. Enter F448 in the [facility] box to learn more about \"Purine-Restricted Diet: Care Instructions. \"     If you do not have an account, please click on the \"Sign Up Now\" link. Current as of: December 17, 2020               Content Version: 12.8  © 2006-2021 Healthwise, [employer]. Care instructions adapted under license by [facility] ([facility]). If you have questions about a medical condition or this instruction, always ask your healthcare professional. Nash Garner any warranty or liability for your use of this information.

## 2021-10-10 NOTE — ED NOTES
Discharge instructions understood as stated by patient. All questions answered.      Yves Zapata RN  10/10/21 0992

## 2021-10-11 NOTE — CARE COORDINATION
Attempted to reach patient for [facility] follow up. No answer to phone. Message left with M contact information and request for call back.

## 2021-10-12 NOTE — CARE COORDINATION
Attempted to reach patient for [facility] ER follow up. No answer to phone. Message left with M contact information and request for call back.

## 2021-10-14 PROBLEM — U07.1 COVID-19: Status: ACTIVE | Noted: 2021-01-01

## 2021-10-14 NOTE — ED NOTES
[id number] paged hospitalist     Gabby Garg  10/14/21 Shantel Meyer  0597 Beata TAYLOR with [facility] returned call     Gabby Garg  10/14/21 0094

## 2021-10-14 NOTE — ED PROVIDER NOTES
I independently examined and evaluated Blanca Hannon. In brief their history revealed patient reports that he has been sick for approximately 9 to 10 days with worsening shortness of breath of the last 3 days. Patient was recently diagnosed with COVID-19. Patient reports Bruno Kaushik did a chest x-ray the other day and I have pneumonia to\". Patient has been on oral antibiotics. Patient reports that shortness of breath is progressed to the point where he cannot complete any activities of daily living without becoming severely winded. Patient reports \"even drinking water gets me short of breath\". Patient denies any chest pain but he does describe diffuse body aches. Patient has poor appetite with limited oral intake over the last 10 days. Some associated diarrhea. No vomiting. Patient denies any known medical problems or daily medications but does not routinely seek medical care. No known cardiac or pulmonary disease. Patient is non-smoker. Their focused exam revealed the patient is afebrile but markedly hypoxemic on room air into the 60s correcting to 90 to 91% on 5 L nasal cannula. The patient appears age appropriate, appears well-hydrated, well-nourished. Appears not to be feeling well. Very pleasant. Mucous membranes are moist. Speech is clear. Breathing is with tachypnea. There is coarse breath sounds bilateral bases. Patient is speaking in short sentences with increased work of breathing. Skin is dry. Mental status is normal. The patient moves all extremities and is without facial droop. No bilateral lower extremity edema.     Results for orders placed or performed during the hospital encounter of 10/14/21   CBC Auto Differential   Result Value Ref Range    WBC 9.5 4.0 - 10.5 K/CU MM    RBC 5.29 4.6 - 6.2 M/CU MM    Hemoglobin 16.7 13.5 - 18.0 GM/DL    Hematocrit 48.1 42 - 52 %    MCV 90.9 78 - 100 FL    MCH 31.6 (H) 27 - 31 PG    MCHC 34.7 32.0 - 36.0 %    RDW 11.9 11.7 - 14.9 %    Platelets 184 140 - 440 K/CU MM    MPV 9.7 7.5 - 11.1 FL    Differential Type AUTOMATED DIFFERENTIAL     Segs Relative 87.9 (H) 36 - 66 %    Lymphocytes % 6.6 (L) 24 - 44 %    Monocytes % 4.5 (H) 0 - 4 %    Eosinophils % 0.1 0 - 3 %    Basophils % 0.2 0 - 1 %    Segs Absolute 8.4 K/CU MM    Lymphocytes Absolute 0.6 K/CU MM    Monocytes Absolute 0.4 K/CU MM    Eosinophils Absolute 0.0 K/CU MM    Basophils Absolute 0.0 K/CU MM    Nucleated RBC % 0.0 %    Total Nucleated RBC 0.0 K/CU MM    Total Immature Neutrophil 0.07 K/CU MM    Immature Neutrophil % 0.7 (H) 0 - 0.43 %   Comprehensive Metabolic Panel   Result Value Ref Range    Sodium 133 (L) 135 - 145 MMOL/L    Potassium 3.4 (L) 3.5 - 5.1 MMOL/L    Chloride 95 (L) 99 - 110 mMol/L    CO2 22 21 - 32 MMOL/L    BUN 18 6 - 23 MG/DL    CREATININE 0.9 0.9 - 1.3 MG/DL    Glucose 124 (H) 70 - 99 MG/DL    Calcium 9.0 8.3 - 10.6 MG/DL    Albumin 4.2 3.4 - 5.0 GM/DL    Total Protein 7.0 6.4 - 8.2 GM/DL    Total Bilirubin 1.1 (H) 0.0 - 1.0 MG/DL    ALT 63 (H) 10 - 40 U/L    AST 61 (H) 15 - 37 IU/L    Alkaline Phosphatase 67 40 - 128 IU/L    GFR Non-African American >60 >60 mL/min/1.73m2    GFR African American >60 >60 mL/min/1.73m2    Anion Gap 16 4 - 16   Troponin   Result Value Ref Range    Troponin T <0.010 <0.01 NG/ML   Brain Natriuretic Peptide   Result Value Ref Range    Pro-BNP 58.25 <300 PG/ML   Blood Gas, Venous   Result Value Ref Range    pH, Mahesh 7.39 7.32 - 7.42    pCO2, Mahesh 41 38 - 52 mmHG    pO2, Mahesh 32 28 - 48 mmHG    Base Excess 0 0 - 3.3    HCO3, Venous 24.8 19 - 25 MMOL/L    O2 Sat, Mahesh 57.5 50 - 70 %    Comment BG    Lactic Acid, Plasma   Result Value Ref Range    Lactate 1.6 0.4 - 2.0 mMOL/L   Procalcitonin   Result Value Ref Range    Procalcitonin 0.199    EKG 12 Lead   Result Value Ref Range    Ventricular Rate 89 BPM    Atrial Rate 89 BPM    P-R Interval 162 ms    QRS Duration 94 ms    Q-T Interval 378 ms    QTc Calculation (Bazett) 459 ms    P Axis 28 degrees    R Axis -19 degrees    T Axis 52 degrees    Diagnosis       Normal sinus rhythm  Minimal voltage criteria for LVH, may be normal variant  Borderline ECG  No previous ECGs available           12 lead EKG per my interpretation:  Normal Sinus Rhythm at 89  Thomas is   Left axis deviation  QTc is  within an acceptable range  There is no specific T wave changes appreciated. There is no specific ST wave changes appreciated. No STEMI    Prior EKG to compare with was NOT available. ED course: Pt presents as above. Emergent conditions considered. Presentation prompted initial labs, EKG and imaging. IVs established IV Decadron, IV Rocephin azithromycin, IV fluids, IV Zofran, IV Toradol and guaifenesin with codeine are given. Chest x-ray with slight worsening of opacities. CBC and CMP without clinically significant derangement other than mild transaminitis which is not unexpected with COVID-19. Troponin negative. BNP is not suggestive of volume overload. Procalcitonin is suggestive of possible sepsis. Lactic acid is not suggestive of significant tissue hypoperfusion. VBG is without acidemia. Patient is with 5 L oxygen requirement which is new from baseline and does require admission. Case to be discussed with hospitalist and patient be admitted to medicine. Questions sought and answered with the patient. They voice understanding and agree with plan. Instructed to return for any worsening or worrisome concerns. CRITICAL CARE NOTE:  There was a high probability of clinically significant life-threatening deterioration of the patient's condition requiring my urgent intervention due to respiratory failure with hypoxemia secondary to COVID-19. IV steroid administration, IV broad-spectrum antibiotic administration, supplemental oxygen administration, telemetry monitoring, frequent reassessments, chart review including most recent chest x-ray was performed to address this.    Total critical care time is AT LEAST 35 minutes. This includes vital sign monitoring, pulse oximetry monitoring, telemetry monitoring, clinical response to the IV medications, reviewing the nursing notes, consultation time, dictation/documentation time, and interpretation of the lab work. This time excludes time spent performing procedures and separately billable procedures and family discussion time. All diagnostic, treatment, and disposition decisions were made by myself in conjunction with the Advanced Practice Provider. For all further details of the patient's emergency department visit, please see the Advanced Practice Provider's documentation.        Kathie Nathan MD  10/14/21 1665

## 2021-10-14 NOTE — ED NOTES
Phone report called to Freddy Fairbanks RN for transfer to unit and assumption of care.        Leann Greer RN  10/14/21 2998

## 2021-10-14 NOTE — ED PROVIDER NOTES
Triage Chief Complaint:   Shortness of Breath (dx with covid 4 days ago )    [facility] [place]:  Today in the ED I had the pleasure of caring for Sabra Rodgers who is a 77 y.o. male that presents to the emergency department complaining of shortness of breath. Shortness breath been progressive over the last 4 days. Patient is Covid positive. Recently diagnosed. Patient is not vaccinated. States that shortness of breath has gotten significantly worse. States he cannot even make it across the room without being of her current profoundly dyspneic. EMS was called. Upon arrival.  Patient was 66% on room air. Patient does not typically require home oxygen. He is not a smoker. Does endorse a little bit of a headache as well as some mild ear pain throat pain. Denies any lightheadedness dizziness sensation of palpitations or syncope. Denies any diarrhea. Endorses decreased appetite decreased smell. Denies any history of DVT PE. Does have a history hypertension or hyperlipidemia    ROS:  REVIEW OF SYSTEMS    At least 10 systems reviewed      All other review of systems are negative  See HPI and nursing notes for additional information       Past Medical History:   Diagnosis Date    Hyperlipidemia     Hypertension      Past Surgical History:   Procedure Laterality Date    APPENDECTOMY      COLONOSCOPY  3-25-13    polyp,s x 5    HERNIA REPAIR       History reviewed. No pertinent family history.   Social History     Socioeconomic History    Marital status:      Spouse name: Not on file    Number of children: Not on file    Years of education: Not on file    Highest education level: Not on file   Occupational History    Not on file   Tobacco Use    Smoking status: Former Smoker     Packs/day: 1.00     Years: 30.00     Pack years: 30.00     Start date: 1991    Smokeless tobacco: Never Used   Substance and Sexual Activity    Alcohol use: Yes     Comment: occasional drinker    Drug use: No    Sexual activity: Not on file   Other Topics Concern    Not on file   Social History Narrative    Not on file     Social Determinants of Health     Financial Resource Strain:     Difficulty of Paying Living Expenses:    Food Insecurity:     Worried About Running Out of Food in the Last Year:     [address] in the Last Year:    Transportation Needs:     Lack of Transportation (Medical):      Lack of Transportation (Non-Medical):    Physical Activity:     Days of Exercise per Week:     Minutes of Exercise per Session:    Stress:     Feeling of Stress :    Social Connections:     Frequency of Communication with Friends and Family:     Frequency of Social Gatherings with Friends and Family:     Attends Advent Services:     Active Member of Clubs or Organizations:     Attends Club or Organization Meetings:     Marital Status:    Intimate Partner Violence:     Fear of Current or Ex-Partner:     Emotionally Abused:     Physically Abused:     Sexually Abused:      Current Facility-Administered Medications   Medication Dose Route Frequency Provider Last Rate Last Admin    dexamethasone (PF) (DECADRON) injection 8 mg  8 mg IntraVENous Q6H Trisha Williamson PA-C         Current Outpatient Medications   Medication Sig Dispense Refill    doxycycline hyclate (VIBRA-TABS) 100 MG tablet Take 1 tablet by mouth 2 times daily for 7 days 14 tablet 0    benzonatate (TESSALON PERLES) 100 MG capsule Take 1 capsule by mouth 3 times daily as needed for Cough 20 capsule 0    colchicine (COLCRYS) 0.6 MG tablet Take two tablets po now, take one tablet po an hour later 3 tablet 0    ATORVASTATIN CALCIUM PO Take 80 mg by mouth      propranolol (INDERAL) 80 MG tablet Take 80 mg by mouth 3 times daily      lisinopril (PRINIVIL;ZESTRIL) 10 MG tablet Take 10 mg by mouth daily      naproxen (NAPROSYN) 500 MG tablet Take 1 tablet by mouth 2 times daily as needed for Pain 20 tablet 0    ibuprofen (ADVIL;MOTRIN) 600 MG tablet Take 600 mg by mouth every 6 hours as needed.  aspirin 325 MG EC tablet Take 325 mg by mouth daily. No Known Allergies    Nursing Notes Reviewed    Physical Exam:  ED Triage Vitals   Enc Vitals Group      BP       Pulse       Resp       Temp       Temp src       SpO2       Weight       Height       Head Circumference       Peak Flow       Pain Score       Pain Loc       Pain Edu? Excl. in [address]? General :Patient is awake alert oriented person place and time no acute distress nontoxic appearing elderly male  HEENT: Pupils are equally round and reactive to light extraocular motors are intact conjunctivae clear sclerae white there is no injection no icterus. Nose without any rhinorrhea or epistaxis. Oral mucosa is moist no exudate buccal mucosa shows no ulcerations. Uvula is midline    Neck: Neck is supple full range of motion trachea midline thyroid nonpalpable  Cardiac: Heart regular rate rhythm no murmurs rubs clicks or gallops  Lungs: Lungs sounds are coarse. . There is no use of accessory muscles no nasal flaring identified. Chest wall: There is no tenderness to palpation over the chest wall or over ribs  Abdomen: Abdomen is soft nontender nondistended. There is no firm or pulsatile masses no rebound rigidity or guarding negative Bazan's negative McBurney, no peritoneal signs  Suprapubic:  there is no tenderness to palpation over the external bladder   Musculoskeletal: 5 out of 5 strength in all 4 extremities full flexion extension abduction and adduction supination pronation of all extremities and all digits. No obvious muscle atrophy is noted. No focal muscle deficits are appreciated  Dermatology: Skin is warm and dry there is no obvious abscesses lacerations or lesions noted  Psych: Mentation is grossly normal cognition is grossly normal. Affect is appropriate  Neuro:  Motor intact sensory intact cranial nerves II through XII are intact level of consciousness is normal cerebellar function is normal reflexes are grossly normal. No evidence of incontinence or loss of bowel or bladder no saddle anesthesia noted Lymphatic: There is no submandibular or cervical adenopathy appreciated. I have reviewed and interpreted all of the currently available lab results from this visit (if applicable):  No results found for this visit on 10/14/21. Radiographs (if obtained):  [] The following radiograph was interpreted by myself in the absence of a radiologist:   [] Radiologist's Report Reviewed:  XR CHEST PORTABLE    (Results Pending)       EKG (if obtained):   Please See Note of attending physician for EKG interpretation. Chart review shows recent radiograph(s):  XR CHEST PORTABLE    Result Date: 10/10/2021  EXAMINATION: ONE XRAY VIEW OF THE CHEST 10/10/2021 1:14 pm COMPARISON: None. HISTORY: ORDERING SYSTEM PROVIDED HISTORY: cough TECHNOLOGIST PROVIDED HISTORY: Reason for exam:->cough Reason for Exam: COUGH, COVID + Acuity: Acute Type of Exam: Initial FINDINGS: Cardiomediastinal silhouette is stable. Bilateral peripherally oriented bandlike and nodular airspace opacities. No pleural effusion or pneumothorax. No gross bony abnormality. Atypical viral pneumonitis. MDM:     Interventions given this visit:   Orders Placed This Encounter   Medications    dexamethasone (PF) (DECADRON) injection 8 mg     [id number]  I have signed out [facility] Emergency Department care to MD Cherry. We discussed the pertinent history, physical exam, completed/pending test results (if applicable) and current treatment plan. Please refer to his/her chart for the patients remaining Emergency Department course and final disposition.      I independently managed patient today in the ED    BP (!) 140/77   Pulse 89   Temp 98.8 °F (37.1 °C) (Oral)   Resp 24   Ht 6' 3\" (1.905 m)   Wt 260 lb (117.9 kg)   SpO2 (!) 84%   BMI 32.50 kg/m²       Clinical Impression:  SOB  Disposition referral (if applicable):  No follow-up provider specified. Disposition medications (if applicable):  New Prescriptions    No medications on file         Comment: Please note this report has been produced using speech recognition software and may contain errors related to that system including errors in grammar, punctuation, and spelling, as well as words and phrases that may be inappropriate. If there are any questions or concerns please feel free to contact the dictating provider for clarification.       Karol Rm, [address] [place] [address]  10/14/21 0600

## 2021-10-14 NOTE — H&P
HISTORY AND PHYSICAL             Date: 10/14/2021        Patient Name: Harsh Wahl     YOB: 1955      Age:  77 y.o. Chief Complaint     Chief Complaint   Patient presents with    Shortness of Breath     dx with covid 4 days ago            History Obtained From   patient, electronic medical record    History of Present Illness   78-year-old man who was seen in our emergency department 4 days ago and diagnosed with Covid pneumonia and discharged home with doxycycline 3 presents today with acute hypoxia and worsening shortness of breath. Patient reports symptoms started approximately 10 days ago. She is not vaccinated for COVID-19. He does not follow with a primary care physician and does not take any medications regularly. He reports no past medical history. Endorses fevers, chills, poor appetite, general overall malaise, shortness of breath including dyspnea on exertion, cough, nausea vomiting, diarrhea. Additional review of symptoms as noted below. Past Medical History     Past Medical History:   Diagnosis Date    Hyperlipidemia     Hypertension         Past Surgical History     Past Surgical History:   Procedure Laterality Date    APPENDECTOMY      COLONOSCOPY  3-25-13    polyp,s x 5    HERNIA REPAIR          Medications Prior to Admission     Prior to Admission medications    Medication Sig Start Date End Date Taking?  Authorizing Provider   doxycycline hyclate (VIBRA-TABS) 100 MG tablet Take 1 tablet by mouth 2 times daily for 7 days 10/10/21 10/17/21  Christian Willson PA-C   benzonatate (TESSALON PERLES) 100 MG capsule Take 1 capsule by mouth 3 times daily as needed for Cough 10/10/21   Christian Willson PA-C   colchicine (COLCRYS) 0.6 MG tablet Take two tablets po now, take one tablet po an hour later 5/13/21   Ridge Jeong PA-C   ATORVASTATIN CALCIUM PO Take 80 mg by mouth    Historical Provider, MD   propranolol (INDERAL) 80 MG tablet Take 80 mg by mouth 3 times daily    Historical Provider, MD   lisinopril (PRINIVIL;ZESTRIL) 10 MG tablet Take 10 mg by mouth daily    Historical Provider, MD   naproxen (NAPROSYN) 500 MG tablet Take 1 tablet by mouth 2 times daily as needed for Pain 11/26/15 12/6/15  Korinne Lusterio, PA-C   ibuprofen (ADVIL;MOTRIN) 600 MG tablet Take 600 mg by mouth every 6 hours as needed. Historical Provider, MD   aspirin 325 MG EC tablet Take 325 mg by mouth daily. Historical Provider, MD        Allergies   Patient has no known allergies. Social History     Social History     Tobacco History     Smoking Status  Former Smoker Smoking Start Date  1/1/1991 Smoking Frequency  1 pack/day for 30 years (30 pk yrs)    Smokeless Tobacco Use  Never Used          Alcohol History     Alcohol Use Status  Yes Comment  occasional drinker          Drug Use     Drug Use Status  No          Sexual Activity     Sexually Active  Not Asked                Family History   History reviewed. No pertinent family history. Review of Systems   Review of Systems   Constitutional: Positive for activity change, appetite change, chills, fatigue and fever. HENT: Positive for congestion and rhinorrhea. Negative for sore throat. Eyes: Negative. Respiratory: Positive for cough, chest tightness, shortness of breath and wheezing. Cardiovascular: Negative for chest pain and leg swelling. Gastrointestinal: Positive for diarrhea and nausea. Negative for abdominal pain, constipation and vomiting. Endocrine: Negative. Genitourinary: Negative for dysuria and hematuria. Musculoskeletal: Negative for neck pain. Skin: Negative for wound. Neurological: Negative for dizziness and light-headedness. All other systems reviewed and are negative.       Physical Exam   BP (!) 113/58   Pulse 88   Temp 98.8 °F (37.1 °C) (Oral)   Resp 18   Ht 6' 3\" (1.905 m)   Wt 260 lb (117.9 kg)   SpO2 91%   BMI 32.50 kg/m²     Physical Exam  Vitals and nursing note reviewed. Constitutional:       General: He is not in acute distress. Appearance: Normal appearance. He is ill-appearing. Interventions: Nasal cannula in place. HENT:      Head: Normocephalic. Nose: Congestion present. Cardiovascular:      Rate and Rhythm: Normal rate. Pulses: Normal pulses. Pulmonary:      Effort: Pulmonary effort is normal. No respiratory distress. Breath sounds: Examination of the right-upper field reveals wheezing and rhonchi. Examination of the left-upper field reveals wheezing and rhonchi. Examination of the right-middle field reveals rhonchi. Examination of the left-middle field reveals rhonchi. Examination of the right-lower field reveals decreased breath sounds and rhonchi. Examination of the left-lower field reveals decreased breath sounds and rhonchi. Decreased breath sounds, wheezing and rhonchi present. Abdominal:      General: Abdomen is flat. Bowel sounds are normal. There is no distension. Tenderness: There is no abdominal tenderness. Musculoskeletal:         General: Normal range of motion. Skin:     General: Skin is warm and dry. Capillary Refill: Capillary refill takes 2 to 3 seconds. Neurological:      General: No focal deficit present. Mental Status: He is alert and oriented to person, place, and time.          Labs      Recent Results (from the past 24 hour(s))   Blood Gas, Venous    Collection Time: 10/14/21  6:30 AM   Result Value Ref Range    pH, Mahesh 7.39 7.32 - 7.42    pCO2, Mahesh 41 38 - 52 mmHG    pO2, Mahesh 32 28 - 48 mmHG    Base Excess 0 0 - 3.3    HCO3, Venous 24.8 19 - 25 MMOL/L    O2 Sat, Mahesh 57.5 50 - 70 %    Comment BG    EKG 12 Lead    Collection Time: 10/14/21  6:53 AM   Result Value Ref Range    Ventricular Rate 89 BPM    Atrial Rate 89 BPM    P-R Interval 162 ms    QRS Duration 94 ms    Q-T Interval 378 ms    QTc Calculation (Bazett) 459 ms    P Axis 28 degrees    R Axis -19 degrees    T Axis 52 degrees Diagnosis       Normal sinus rhythm  Minimal voltage criteria for LVH, may be normal variant  Borderline ECG  No previous ECGs available     CBC Auto Differential    Collection Time: 10/14/21  6:57 AM   Result Value Ref Range    WBC 9.5 4.0 - 10.5 K/CU MM    RBC 5.29 4.6 - 6.2 M/CU MM    Hemoglobin 16.7 13.5 - 18.0 GM/DL    Hematocrit 48.1 42 - 52 %    MCV 90.9 78 - 100 FL    MCH 31.6 (H) 27 - 31 PG    MCHC 34.7 32.0 - 36.0 %    RDW 11.9 11.7 - 14.9 %    Platelets 317 805 - 670 K/CU MM    MPV 9.7 7.5 - 11.1 FL    Differential Type AUTOMATED DIFFERENTIAL     Segs Relative 87.9 (H) 36 - 66 %    Lymphocytes % 6.6 (L) 24 - 44 %    Monocytes % 4.5 (H) 0 - 4 %    Eosinophils % 0.1 0 - 3 %    Basophils % 0.2 0 - 1 %    Segs Absolute 8.4 K/CU MM    Lymphocytes Absolute 0.6 K/CU MM    Monocytes Absolute 0.4 K/CU MM    Eosinophils Absolute 0.0 K/CU MM    Basophils Absolute 0.0 K/CU MM    Nucleated RBC % 0.0 %    Total Nucleated RBC 0.0 K/CU MM    Total Immature Neutrophil 0.07 K/CU MM    Immature Neutrophil % 0.7 (H) 0 - 0.43 %   Comprehensive Metabolic Panel    Collection Time: 10/14/21  6:57 AM   Result Value Ref Range    Sodium 133 (L) 135 - 145 MMOL/L    Potassium 3.4 (L) 3.5 - 5.1 MMOL/L    Chloride 95 (L) 99 - 110 mMol/L    CO2 22 21 - 32 MMOL/L    BUN 18 6 - 23 MG/DL    CREATININE 0.9 0.9 - 1.3 MG/DL    Glucose 124 (H) 70 - 99 MG/DL    Calcium 9.0 8.3 - 10.6 MG/DL    Albumin 4.2 3.4 - 5.0 GM/DL    Total Protein 7.0 6.4 - 8.2 GM/DL    Total Bilirubin 1.1 (H) 0.0 - 1.0 MG/DL    ALT 63 (H) 10 - 40 U/L    AST 61 (H) 15 - 37 IU/L    Alkaline Phosphatase 67 40 - 128 IU/L    GFR Non-African American >60 >60 mL/min/1.73m2    GFR African American >60 >60 mL/min/1.73m2    Anion Gap 16 4 - 16   Troponin    Collection Time: 10/14/21  6:57 AM   Result Value Ref Range    Troponin T <0.010 <0.01 NG/ML   Brain Natriuretic Peptide    Collection Time: 10/14/21  6:57 AM   Result Value Ref Range    Pro-BNP 58.25 <300 PG/ML   Lactic Acid, Plasma    Collection Time: 10/14/21  6:57 AM   Result Value Ref Range    Lactate 1.6 0.4 - 2.0 mMOL/L   Procalcitonin    Collection Time: 10/14/21  6:57 AM   Result Value Ref Range    Procalcitonin 0.199         Imaging/Diagnostics Last 24 Hours   XR CHEST PORTABLE    Result Date: 10/14/2021  EXAMINATION: ONE XRAY VIEW OF THE CHEST 10/14/2021 6:06 am COMPARISON: Chest radiograph dated October 10, 2021 HISTORY: ORDERING SYSTEM PROVIDED HISTORY: chest pain TECHNOLOGIST PROVIDED HISTORY: Reason for exam:->chest pain Reason for Exam: chest pain Acuity: Acute Type of Exam: Initial Mechanism of Injury: chest pain Relevant Medical/Surgical History: chest pain FINDINGS: Cardiomediastinal silhouette is stable. Bilateral airspace opacities are seen with interval worsening the left base. There is no pneumothorax or pleural effusion. Bilateral airspace opacities are seen with slight interval worsening. Assessment      Hospital Problems         Last Modified POA    COVID-19 10/14/2021 Yes          Plan   #COVID 19  #Hypoxia   -   - Admit to inpatient , COVID 19 unit . Failure of outpatient medical therapy with worsening hypoxia   -Chest x-ray with nonspecific pulmonary infiltrate consistent with atypical/viral pneumonia   -Blood cultures x2 check urine for Legionella antigen, Strep PNA    -Start Decadron, continue t Rocephin as initiated in the ED. change antibiotic to doxycycline as patient started doxy 4 days ago with Covid in ER.    -Wean oxygen as tolerated   -Albuterol as needed for wheezing   Daily CBC, CMP, CRP, INR, D-dimer, procalcitonin q48hr   Telemetry and continuous pulse ox   Droplet plus precaution   -Not a candidate for Remdesevir d/t onset >7 days    -Check LDH, D-dimer, ferritin, CRP for evidence of inflammatory biomarkers and consideration of starting Baricitinib. Will need TB and hepatitis testing if initiated. #Dehydration  #Hypokalemia   -Secondary to COVID-19.   Start maintenance IV 0.9 NS @ 125 cc/hr. Electrolyte replacement per protocol with IV due to nausea/ vomitting. Repeat electrolyte panel this afternoon. Diet ADULT DIET; Regular  Adult Oral Nutrition Supplement; Frozen Oral Supplement     DVT Prophylaxis [x] Lovenox, []  Heparin, [] SCDs, [] Ambulation   GI Prophylaxis [] PPI,  [x] H2 Blocker,  [] Carafate,  [] Diet/Tube Feeds   Code Status Full Code   Disposition Patient requires continued admission due to COVID 19 with hypoxia      MDM [] Low, [] Moderate,[x]  High  Patient's risk as above      Patient discussed with and evaluated by Dr Min Guzman who agrees with the above diagnosis, treatment and disposition. All testing  and results reviewed with patient . All questions answered.  Patient and family voiced understanding                Consultations Ordered:  IP CONSULT TO HOSPITALIST    Electronically signed by ARIANNA Harper CNP on 10/14/21 at 9:20 AM EDT

## 2021-10-15 NOTE — PROGRESS NOTES
Physician Progress Note      PATIENT:               Gigi Duque  CSN #:                  [id number]  :                       1955  ADMIT DATE:       10/14/2021 5:46 AM  DISCH DATE:  RESPONDING  PROVIDER #:        Vandana Whitley          QUERY TEXT:    Hospitalists,    Pt admitted with COVID PNA. If possible, please document in the progress notes   and discharge summary if you are evaluating and/or treating any of the   following: The medical record reflects the following:  Risk Factors: COVID PNA  Clinical Indicators: c/o worsened SOB, RR 18-24, O2 sat 84-93%  Treatment: labs, imaging, high flow heated  O2 @ 15L, pulse ox    Thank you,  Murl Cogan RN CDS  [phone]  Options provided:  -- Acute respiratory failure with hypoxia  -- Acute respiratory failure with hypoxia ruled out  -- Other - I will add my own diagnosis  -- Disagree - Not applicable / Not valid  -- Disagree - Clinically unable to determine / Unknown  -- Refer to Clinical Documentation Reviewer    PROVIDER RESPONSE TEXT:    This patient is in acute respiratory failure with hypoxia.     Query created by: Jose Salvador on 10/15/2021 1:29 PM      Electronically signed by:  Vandana Whitley 10/15/2021 6:54 PM

## 2021-10-15 NOTE — PROGRESS NOTES
ATTENDING PHYSICIAN'S PROGRESS NOTES    Patient:  Bairon Chung      Unit/Bed:3014/3014-A    YOB: 1955    MRN: [id number]     Acct: [de-identified]     Admit date: 10/14/2021    Patient Seen, Chart, Consults notes, Labs, Radiology studies reviewed. SUBJECTIVE:   Day 1 of stay with Covid-19 and hypoxic resp failure. No new complaints. On 15 L NC. Has some SOB. All other ROS negative except noted in HPI    Past, Family, Social History unchanged from admission. Diet:  ADULT DIET; Regular  Adult Oral Nutrition Supplement; Frozen Oral Supplement    Medications:  Scheduled Meds:   zinc sulfate  50 mg Oral Daily    melatonin  3 mg Oral Nightly    ascorbic acid  1,000 mg Oral Daily    baricitinib  4 mg Oral Daily    [START ON 10/16/2021] dexamethasone  10 mg IntraVENous Q24H    sodium chloride flush  5-40 mL IntraVENous 2 times per day    enoxaparin  30 mg SubCUTAneous BID    cefTRIAXone (ROCEPHIN) IV  1,000 mg IntraVENous Q24H    And    doxycycline hyclate  100 mg Oral 2 times per day    ipratropium  2 puff Inhalation 4x daily    famotidine  20 mg Oral BID     Continuous Infusions:   sodium chloride      sodium chloride 75 mL/hr at 10/15/21 0946     PRN Meds:sodium chloride flush, sodium chloride, ondansetron **OR** ondansetron, polyethylene glycol, acetaminophen **OR** acetaminophen, guaiFENesin-dextromethorphan, albuterol sulfate HFA **AND** ipratropium, potassium chloride **OR** potassium alternative oral replacement **OR** potassium chloride    OBJECTIVE:  CBC:   Recent Labs     10/14/21  0657 10/15/21  0848   WBC 9.5 10.3   HGB 16.7 17.6    306     BMP:    Recent Labs     10/14/21  0657 10/15/21  0848   * 134*   K 3.4* 3.6   CL 95* 98*   CO2 22 22   BUN 18 18   CREATININE 0.9 0.6*   GLUCOSE 124* 174*     Calcium:  Recent Labs     10/15/21  0848   CALCIUM 8.9     Ionized Calcium:No results for input(s): IONCA in the last 72 hours.   Magnesium:No results for input(s): MG in the last 72 hours. Phosphorus:No results for input(s): PHOS in the last 72 hours. BNP:No results for input(s): BNP in the last 72 hours. Glucose:No results for input(s): POCGLU in the last 72 hours. HgbA1C: No results for input(s): LABA1C in the last 72 hours. INR: No results for input(s): INR in the last 72 hours. Hepatic:   Recent Labs     10/15/21  0848   ALKPHOS 65   ALT 59*   AST 45*   PROT 6.7   BILITOT 0.8   LABALBU 4.0     Amylase and Lipase:No results for input(s): LACTA, AMYLASE in the last 72 hours. Lactic Acid: No results for input(s): LACTA in the last 72 hours. Troponin:   Recent Labs     10/14/21  0657   TROPONINT <0.010     BNP: No results for input(s): BNP in the last 72 hours. Lipids: No results for input(s): CHOL, TRIG, HDL, LDLCALC in the last 72 hours. Invalid input(s): LDL  ABGs: No results found for: PH, PCO2, PO2, HCO3, O2SAT    Radiology reports as per the Radiologist  Radiology: XR CHEST PORTABLE    Result Date: 10/14/2021  EXAMINATION: ONE XRAY VIEW OF THE CHEST 10/14/2021 6:06 am COMPARISON: Chest radiograph dated October 10, 2021 HISTORY: ORDERING SYSTEM PROVIDED HISTORY: chest pain TECHNOLOGIST PROVIDED HISTORY: Reason for exam:->chest pain Reason for Exam: chest pain Acuity: Acute Type of Exam: Initial Mechanism of Injury: chest pain Relevant Medical/Surgical History: chest pain FINDINGS: Cardiomediastinal silhouette is stable. Bilateral airspace opacities are seen with interval worsening the left base. There is no pneumothorax or pleural effusion. Bilateral airspace opacities are seen with slight interval worsening.         Physical Exam:  Vitals: /63   Pulse 71   Temp 97.8 °F (36.6 °C) (Oral)   Resp 26   Ht 6' 3\" (1.905 m)   Wt 260 lb (117.9 kg)   SpO2 92%   BMI 32.50 kg/m²   24 hour intake/output:    Intake/Output Summary (Last 24 hours) at 10/15/2021 1658  Last data filed at 10/15/2021 1502  Gross per 24 hour   Intake 10 ml   Output 450 ml   Net -440 ml     Last 3 weights: Wt Readings from Last 3 Encounters:   10/14/21 260 lb (117.9 kg)   10/10/21 260 lb (117.9 kg)   05/13/21 252 lb 12.8 oz (114.7 kg)       General appearance - alert, well appearing, and in no distress and overweight  HEENT: Normocephalic, Atraumatic, Conjuctiva pink, PERRL, Oral mucosa normal, Lips, teeth and gums normal, Trachea midline, Thyroid normal and No noted lymphadenopathy  Chest - bibasilar rales. Cardiovascular - normal rate, regular rhythm, normal S1, S2, no murmurs, rubs, clicks or gallops  Abdomen - soft, nontender, nondistended, no masses or organomegaly   Neurological - Alert and oriented, Normal speech, No focal findings or movement disorder noted and Motor and sensory grossly normal bilaterally  Integumentary - Skin color, texture, turgor normal. No Rashes or lesions  Musculoskeletal -Full ROM times 4 extremities, No clubbing or cyanosis and No peripheral edema      DVT prophylaxis: [x] Lovenox                                 [] SCDs                                 [] SQ Heparin                                 [] Encourage ambulation           [] Already on Anticoagulation                 ASSESSMENT / PLAN :    Active Problems:    COVID-19  Resolved Problems:    * No resolved hospital problems. *    #COVID 19 PNA  Acute Hypoxic resp failure              - Failed outpatient medical therapy with worsening hypoxia              -Chest x-ray with nonspecific pulmonary infiltrate consistent with atypical/viral pneumonia              -Follow Blood cultures               -Urine Legionella antigen/Strep antigen neg              -On Decadron,Rocephin/doxy and baricitinib.              -Wean oxygen as tolerated              -Albuterol as needed for wheezing              Telemetry and continuous pulse ox              Droplet plus precaution              Not a candidate for Remdesevir d/t onset >7 days             -Follow inflammatory markers and LFT.              -Monitor blood sugars on steroids.     #Dehydration: Gentle hydration. Mild hyponatremia but better.      #Hypokalemia  Replace and recheck in AM.      Electronically signed by Ramo Mcneil MD on 10/15/2021 at 4:58 PM    Rounding Hospitalist

## 2021-10-15 NOTE — PLAN OF CARE
Problem: Falls - Risk of:  Goal: Will remain free from falls  Description: Will remain free from falls  10/15/2021 1214 by Ebenezer Villatoro LPN  Outcome: Ongoing  10/15/2021 1214 by Ebenezer Villatoro LPN  Outcome: Ongoing  Goal: Absence of physical injury  Description: Absence of physical injury  10/15/2021 1214 by Ebenezer Villatoro LPN  Outcome: Ongoing  10/15/2021 1214 by Ebenezer Villatoro LPN  Outcome: Ongoing     Problem: Airway Clearance - Ineffective  Goal: Achieve or maintain patent airway  Outcome: Ongoing     Problem: Gas Exchange - Impaired  Goal: Absence of hypoxia  Outcome: Ongoing  Goal: Promote optimal lung function  Outcome: Ongoing     Problem:  Body Temperature -  Risk of, Imbalanced  Goal: Ability to maintain a body temperature within defined limits  Outcome: Ongoing  Goal: Will regain or maintain usual level of consciousness  Outcome: Ongoing  Goal: Complications related to the disease process, condition or treatment will be avoided or minimized  Outcome: Ongoing     Problem: Isolation Precautions - Risk of Spread of Infection  Goal: Prevent transmission of infection  Outcome: Ongoing     Problem: Nutrition Deficits  Goal: Optimize nutritional status  Outcome: Ongoing     Problem: Risk for Fluid Volume Deficit  Goal: Maintain normal heart rhythm  Outcome: Ongoing  Goal: Maintain absence of muscle cramping  Outcome: Ongoing  Goal: Maintain normal serum potassium, sodium, calcium, phosphorus, and pH  Outcome: Ongoing     Problem: Loneliness or Risk for Loneliness  Goal: Demonstrate positive use of time alone when socialization is not possible  Outcome: Ongoing     Problem: Fatigue  Goal: Verbalize increase energy and improved vitality  Outcome: Ongoing     Problem: Patient Education: Go to Patient Education Activity  Goal: Patient/Family Education  Outcome: Ongoing

## 2021-10-15 NOTE — CONSULTS
Pharmacy Consult for Baricitinib Initiation per Dr. Marti Stoner per [facility] P&T Committee  **All criteria need to be met to receive   Baricitinib for COVID-19 patients**   Age    >5years old     Yes   Laboratory Results    Confirmed positive COVID-19     PLUS    ANY elevation in biomarkers   (CRP, D-dimer, LDH, ferritin)       Yes     Concomitant Therapy    Receiving systemic steroids for treatment of COVID 19     Yes   Oxygen Status        Requiring supplemental oxygen   (>1 L NC, HFNC, Heated Vapotherm, biPAP, mechanical ventilation)        Yes   Special Considerations    Pregnancy  Severe Hepatic Impairment  Chronic/Recurrent Infections   Hemoglobin <8         Clarify risk vs. benefit with provider if criteria met     Contraindications    1. Invasive active mycobacterial or fungal infection  2. Lymphocyte count <200  3. Neutrophil count, ANC <500   4. Significant immunosuppression    ? None     Dosing Recommendations:    Baricitinib 4 mg PO daily x 14 days (or until hospital discharge)    Renal dose adjustment:  -eGFR > 60: no adjustment necessary       Thank you for the consult,  Natasha Whalen.  Rene Francois  10/15/2021 @ 8:59 AM

## 2021-10-15 NOTE — PROGRESS NOTES
10/15/21 0746   Oxygen Therapy/Pulse Ox   O2 Therapy Oxygen   $Oxygen $Daily Charge   O2 Device Heated high flow cannula   O2 Flow Rate (L/min) 15 L/min   Resp 21   SpO2 91 %   Pulse Oximeter Device Mode Continuous   Pulse Oximeter Device Location Finger   $Pulse Oximeter $Spot check (multiple/continuous)   Pt on 15L HFNC - Sats are 93% Tissue Cultured Epidermal Autograft Text: The defect edges were debeveled with a #15 scalpel blade.  Given the location of the defect, shape of the defect and the proximity to free margins a tissue cultured epidermal autograft was deemed most appropriate.  The graft was then trimmed to fit the size of the defect.  The graft was then placed in the primary defect and oriented appropriately.

## 2021-10-16 PROBLEM — J96.01 ACUTE RESPIRATORY FAILURE WITH HYPOXIA (HCC): Status: ACTIVE | Noted: 2021-01-01

## 2021-10-16 PROBLEM — E87.6 HYPOKALEMIA: Status: ACTIVE | Noted: 2021-01-01

## 2021-10-16 PROBLEM — E87.1 HYPONATREMIA: Status: ACTIVE | Noted: 2021-01-01

## 2021-10-16 PROBLEM — R73.9 HYPERGLYCEMIA: Status: ACTIVE | Noted: 2021-01-01

## 2021-10-16 NOTE — PROGRESS NOTES
ATTENDING PHYSICIAN'S PROGRESS NOTES    Patient:  Ivett Clevelnad      Unit/Bed:3014/3014-A    YOB: 1955    MRN: [id number]     Acct: [de-identified]     Admit date: 10/14/2021    Patient Seen, Chart, Consults notes, Labs, Radiology studies reviewed. SUBJECTIVE:   Day 2 of stay with Covid-19 and hypoxic resp failure. No new complaints. On 15 L this morning but noted he is now down to 11L NC. All other ROS negative except noted in HPI    Past, Family, Social History unchanged from admission. Diet:  ADULT DIET; Regular  Adult Oral Nutrition Supplement;  Low Calorie/High Protein Oral Supplement    Medications:  Scheduled Meds:   insulin glargine  10 Units SubCUTAneous Nightly    zinc sulfate  50 mg Oral Daily    melatonin  3 mg Oral Nightly    ascorbic acid  1,000 mg Oral Daily    baricitinib  4 mg Oral Daily    dexamethasone  10 mg IntraVENous Q24H    insulin lispro  0-12 Units SubCUTAneous TID WC    insulin lispro  0-6 Units SubCUTAneous Nightly    albuterol sulfate HFA  2 puff Inhalation 4x daily    And    ipratropium  2 puff Inhalation 4x daily    sodium chloride flush  5-40 mL IntraVENous 2 times per day    enoxaparin  30 mg SubCUTAneous BID    cefTRIAXone (ROCEPHIN) IV  1,000 mg IntraVENous Q24H    And    doxycycline hyclate  100 mg Oral 2 times per day    famotidine  20 mg Oral BID     Continuous Infusions:   dextrose      sodium chloride       PRN Meds:glucose, dextrose, glucagon (rDNA), dextrose, sodium chloride flush, sodium chloride, ondansetron **OR** ondansetron, polyethylene glycol, acetaminophen **OR** acetaminophen, guaiFENesin-dextromethorphan, potassium chloride **OR** potassium alternative oral replacement **OR** potassium chloride    OBJECTIVE:  CBC:   Recent Labs     10/14/21  0657 10/15/21  0848   WBC 9.5 10.3   HGB 16.7 17.6    306     BMP:    Recent Labs     10/14/21  0657 10/15/21  0848   * 134*   K 3.4* 3.6   CL 95* 98*   CO2 22 22 BUN 18 18   CREATININE 0.9 0.6*   GLUCOSE 124* 174*     Calcium:  Recent Labs     10/15/21  0848   CALCIUM 8.9     Ionized Calcium:No results for input(s): IONCA in the last 72 hours. Magnesium:No results for input(s): MG in the last 72 hours. Phosphorus:No results for input(s): PHOS in the last 72 hours. BNP:No results for input(s): BNP in the last 72 hours. Glucose:  Recent Labs     10/16/21  0637 10/16/21  1149 10/16/21  1650   POCGLU 145* 189* 192*     HgbA1C: No results for input(s): LABA1C in the last 72 hours. INR: No results for input(s): INR in the last 72 hours. Hepatic:   Recent Labs     10/15/21  0848   ALKPHOS 65   ALT 59*   AST 45*   PROT 6.7   BILITOT 0.8   LABALBU 4.0     Amylase and Lipase:No results for input(s): LACTA, AMYLASE in the last 72 hours. Lactic Acid: No results for input(s): LACTA in the last 72 hours. Troponin:   Recent Labs     10/14/21  0657   TROPONINT <0.010     BNP: No results for input(s): BNP in the last 72 hours. Lipids: No results for input(s): CHOL, TRIG, HDL, LDLCALC in the last 72 hours. Invalid input(s): LDL  ABGs: No results found for: PH, PCO2, PO2, HCO3, O2SAT    Radiology reports as per the Radiologist  Radiology:   XR CHEST PORTABLE  Result Date: 10/14/2021  EXAMINATION: ONE XRAY VIEW OF THE CHEST 10/14/2021 6:06 am COMPARISON: Chest radiograph dated October 10, 2021 HISTORY: ORDERING SYSTEM PROVIDED HISTORY: chest pain TECHNOLOGIST PROVIDED HISTORY: Reason for exam:->chest pain Reason for Exam: chest pain Acuity: Acute Type of Exam: Initial Mechanism of Injury: chest pain Relevant Medical/Surgical History: chest pain FINDINGS: Cardiomediastinal silhouette is stable. Bilateral airspace opacities are seen with interval worsening the left base. There is no pneumothorax or pleural effusion. Bilateral airspace opacities are seen with slight interval worsening.         Physical Exam:  Vitals: /76   Pulse 74   Temp 97.8 °F (36.6 °C) (Oral)   Resp 25    6' 3\" (1.905 m)   Wt 260 lb (117.9 kg)   SpO2 91%   BMI 32.50 kg/m²   24 hour intake/output:  No intake or output data in the 24 hours ending 10/16/21 1817  Last 3 weights: Wt Readings from Last 3 Encounters:   10/14/21 260 lb (117.9 kg)   10/10/21 260 lb (117.9 kg)   05/13/21 252 lb 12.8 oz (114.7 kg)       General appearance - Alert, well appearing, and in no distress and overweight  HEENT: Normocephalic, Atraumatic, Conjuctiva pink, PERRL, Oral mucosa normal, Lips, teeth and gums normal, Trachea midline, Thyroid normal and No noted lymphadenopathy  Chest - bibasilar rales. Good air entry bilaterally. Cardiovascular - normal rate, regular rhythm, normal S1, S2, no murmurs, rubs, clicks or gallops  Abdomen - soft, nontender, nondistended, no masses or organomegaly   Neurological - Alert and oriented, Normal speech, No focal findings or movement disorder noted and Motor and sensory grossly normal bilaterally  Integumentary - Skin color, texture, turgor normal. No Rashes or lesions  Musculoskeletal -Full ROM times 4 extremities, No clubbing or cyanosis and No peripheral edema      DVT prophylaxis: [x] Lovenox                                 [] SCDs                                 [] SQ Heparin                                 [] Encourage ambulation           [] Already on Anticoagulation                 ASSESSMENT / PLAN :    Active Problems:    COVID-19    Acute respiratory failure with hypoxia (HCC)    Hyponatremia    Hyperglycemia    Hypokalemia  Resolved Problems:    * No resolved hospital problems.  *    #COVID 19 PNA  Acute Hypoxic resp failure              - Failed outpatient medical therapy with worsening hypoxia              -Chest x-ray with nonspecific pulmonary infiltrate consistent with atypical/viral pneumonia              - Blood cultures pending              -Urine Legionella antigen/Strep antigen neg              -On Decadron,Rocephin/doxy and baricitinib.              -Continue Lovenox.              -Wean oxygen as able.              -Albuterol as needed for wheezing              -Telemetry and continuous pulse ox              -Droplet plus precaution              -Not a candidate for Remdesevir d/t onset >7 days             -Follow inflammatory markers and LFT. Procal trending down. -Monitor blood sugars on steroids.     #Dehydration/Hyponatremia:   Eating and drinking well. Will d/c iv fluid. Renal function good. BMP in AM.    #Hypokalemia  Replaced.  K 3.6 10/15    Hyperglycemia  Sec to steroid  Add low dose Lantus  Continue sliding scale and monitor.       Electronically signed by Beltran Sweet MD on 10/16/2021 at 6:17 PM    [facility] Hospitalist

## 2021-10-16 NOTE — PROGRESS NOTES
10/16/21 1238   Oxygen Therapy/Pulse Ox   O2 Device High flow nasal cannula   O2 Flow Rate (L/min) 13 L/min   Resp 14   SpO2 93 %

## 2021-10-16 NOTE — PROGRESS NOTES
This note also relates to the following rows which could not be included:  Resp - Cannot attach notes to unvalidated device data  SpO2 - Cannot attach notes to unvalidated device data       10/16/21 0746   Treatment   Treatment Type MDI   $Treatment Type $Inhaled Therapy/Meds   Medications Ipratropium Bromide   Pre-Tx Pulse 60   Pre-Tx Resps 18   Breath Sounds Pre-Tx RITA Diminished   Breath Sounds Pre-Tx LLL Diminished   Breath Sounds Pre-Tx RUL Diminished   Breath Sounds Pre-Tx RML Diminished   Breath Sounds Pre-Tx RLL Diminished   Breath Sounds Post-Tx RITA Diminished   Breath Sounds Post-Tx LLL Diminished   Breath Sounds Post-Tx RUL Diminished   Breath Sounds Post-Tx RML Diminished   Breath Sounds Post-Tx RLL Diminished   Oxygen Therapy/Pulse Ox   O2 Therapy Oxygen humidified   $Oxygen $Daily Charge   O2 Device Heated high flow cannula   O2 Flow Rate (L/min) 15 L/min   Pulse Oximeter Device Mode Continuous   Pulse Oximeter Device Location Finger   $Pulse Oximeter $Spot check (multiple/continuous)

## 2021-10-16 NOTE — PROGRESS NOTES
10/16/21 1606   Oxygen Therapy/Pulse Ox   O2 Therapy Oxygen humidified   O2 Device High flow nasal cannula   O2 Flow Rate (L/min) 11 L/min   Resp 25   SpO2 91 %

## 2021-10-16 NOTE — PLAN OF CARE
Nutrition Problem #1: Inadequate oral intake  Intervention: Food and/or Nutrient Delivery: Continue Current Diet, Start Oral Nutrition Supplement  Nutritional Goals: Pt will consistently consume greater than 50-75% of meals and supplements

## 2021-10-16 NOTE — PROGRESS NOTES
Comprehensive Nutrition Assessment    Type and Reason for Visit:  Positive Nutrition Screen    Nutrition Recommendations/Plan:   Continue diet as ordered  Add high protein ONS  Please document all po intake  Please obtain measured weight so that nutrition status can be more accurately assessed  Will monitor po intake, weight trends, poc    Nutrition Assessment:  Pt admitted for acute respiratory failure with hypoxemia, multifocal pneumonia, covid-19, PMH: HTN, HLD, positive nutrition screen for poor appetite and weight loss, pt currently on regular diet with frozen oral nutrition supplement with no po intake documented in the past 48 hr, spoke with pt via telephone, pt reports UBW ~265#, pt suspects wt loss, unable to confirm wt loss with weight hx, pt reports taste and smell changes, reports that appetite is improved today, reports that yesterday he consumed ~35% of meals however today he consumed ~95% of breakfast and lunch, pt agreeable to trial high protein oral nutrition supplement, pt is at moderate nutrition risk    Malnutrition Assessment:  Malnutrition Status:  Insufficient data    Context:  Acute Illness       Estimated Daily Nutrient Needs:  Energy (kcal):  5005-2419 ([address] w/ stress factor 1.0-1.2); Weight Used for Energy Requirements:  Current     Protein (g):   (1.0-1.2 g/kg); Weight Used for Protein Requirements:  Ideal        Fluid (ml/day):  2000; Method Used for Fluid Requirements:  1 ml/kcal      Nutrition Related Findings:  Glucose 174      Wounds:  None       Current Nutrition Therapies:    ADULT DIET; Regular  Adult Oral Nutrition Supplement; Frozen Oral Supplement    Anthropometric Measures:  · Height: 6' 3\" (190.5 cm)  · Current Body Weight: 259 lb 14.8 oz (117.9 kg) (unknown weight source)   · Admission Body Weight:  (n/a)    · Ideal Body Weight: 196 lbs; % Ideal Body Weight 132.6 %   · BMI: 32.5  · BMI Categories: Obese Class 1 (BMI 30.0-34. 9)       Nutrition Diagnosis: · Inadequate oral intake related to poor appetite as evidenced by poor intake prior to admission    Nutrition Interventions:   Food and/or Nutrient Delivery:  Continue Current Diet, Start Oral Nutrition Supplement  Nutrition Education/Counseling:  No recommendation at this time   Coordination of Nutrition Care:  Continue to monitor while inpatient    Goals:  Pt will consistently consume greater than 50-75% of meals and supplements       Nutrition Monitoring and Evaluation:   Behavioral-Environmental Outcomes:  None Identified   Food/Nutrient Intake Outcomes:  Food and Nutrient Intake, Supplement Intake  Physical Signs/Symptoms Outcomes:  Biochemical Data, Weight, GI Status, Hemodynamic Status, Fluid Status or Edema     Discharge Planning:     Too soon to determine     Electronically signed by Saad Medellin, MS, RD, LD on 10/16/21 at 1:44 PM EDT    Contact:[phone]

## 2021-10-17 NOTE — PROGRESS NOTES
10/17/21 1621   Oxygen Therapy/Pulse Ox   O2 Therapy Oxygen   O2 Device High flow nasal cannula   O2 Flow Rate (L/min) 13 L/min

## 2021-10-17 NOTE — PROGRESS NOTES
ATTENDING PHYSICIAN'S PROGRESS NOTES    Patient:  Aranza Cole      Unit/Bed:3014/3014-A    YOB: 1955    MRN: [id number]     Acct: [de-identified]     Admit date: 10/14/2021    Patient Seen, Chart, Consults notes, Labs, Radiology studies reviewed. SUBJECTIVE:   Day 3 of stay with Covid-19 and hypoxic resp failure. No new complaints. Back on 15 L . Complains of dry teary eyes,itches manuel the right. Vison normal Also states he is very hot and wants to turn down his room temp. He has a t shirt underneath his hosp gown and was asked to remove it. C/o headache and wants ibuprofen. Refused tylenol. All other ROS negative except noted in HPI    Past, Family, Social History unchanged from admission. Diet:  ADULT DIET; Regular  Adult Oral Nutrition Supplement;  Low Calorie/High Protein Oral Supplement    Medications:  Scheduled Meds:   albuterol sulfate HFA  2 puff Inhalation BID    And    ipratropium  2 puff Inhalation BID    insulin glargine  10 Units SubCUTAneous Nightly    zinc sulfate  50 mg Oral Daily    melatonin  3 mg Oral Nightly    ascorbic acid  1,000 mg Oral Daily    baricitinib  4 mg Oral Daily    dexamethasone  10 mg IntraVENous Q24H    insulin lispro  0-12 Units SubCUTAneous TID WC    insulin lispro  0-6 Units SubCUTAneous Nightly    sodium chloride flush  5-40 mL IntraVENous 2 times per day    enoxaparin  30 mg SubCUTAneous BID    cefTRIAXone (ROCEPHIN) IV  1,000 mg IntraVENous Q24H    famotidine  20 mg Oral BID     Continuous Infusions:   dextrose      sodium chloride       PRN Meds:Artificial Tears, ibuprofen, glucose, dextrose, glucagon (rDNA), dextrose, sodium chloride flush, sodium chloride, ondansetron **OR** ondansetron, polyethylene glycol, acetaminophen **OR** acetaminophen, guaiFENesin-dextromethorphan, potassium chloride **OR** potassium alternative oral replacement **OR** potassium chloride    OBJECTIVE:  CBC:   Recent Labs     10/15/21  0848 10/17/21  0855   WBC 10.3 10.9*   HGB 17.6 16.7    323     BMP:    Recent Labs     10/15/21  0848 10/17/21  0855   * 141   K 3.6 3.7   CL 98* 106   CO2 22 22   BUN 18 16   CREATININE 0.6* 0.7*   GLUCOSE 174* 185*     Calcium:  Recent Labs     10/17/21  0855   CALCIUM 9.0     Ionized Calcium:No results for input(s): IONCA in the last 72 hours. Magnesium:  Recent Labs     10/17/21  0855   MG 2.5*     Phosphorus:  Recent Labs     10/17/21  0855   PHOS 3.0     BNP:No results for input(s): BNP in the last 72 hours. Glucose:  Recent Labs     10/16/21  2110 10/17/21  0732 10/17/21  1139   POCGLU 184* 148* 164*     HgbA1C: No results for input(s): LABA1C in the last 72 hours. INR: No results for input(s): INR in the last 72 hours. Hepatic:   Recent Labs     10/17/21  0855   ALKPHOS 88   ALT 95*   AST 72*   PROT 6.5   BILITOT 0.8   BILIDIR 0.3   LABALBU 3.7  3.7     Amylase and Lipase:No results for input(s): LACTA, AMYLASE in the last 72 hours. Lactic Acid: No results for input(s): LACTA in the last 72 hours. Troponin:   No results for input(s): CKTOTAL, CKMB, TROPONINT in the last 72 hours. BNP: No results for input(s): BNP in the last 72 hours. Lipids: No results for input(s): CHOL, TRIG, HDL, LDLCALC in the last 72 hours. Invalid input(s): LDL  ABGs: No results found for: PH, PCO2, PO2, HCO3, O2SAT    Radiology reports as per the Radiologist  Radiology:   XR CHEST PORTABLE  Result Date: 10/14/2021  EXAMINATION: ONE XRAY VIEW OF THE CHEST 10/14/2021 6:06 am COMPARISON: Chest radiograph dated October 10, 2021 HISTORY: ORDERING SYSTEM PROVIDED HISTORY: chest pain TECHNOLOGIST PROVIDED HISTORY: Reason for exam:->chest pain Reason for Exam: chest pain Acuity: Acute Type of Exam: Initial Mechanism of Injury: chest pain Relevant Medical/Surgical History: chest pain FINDINGS: Cardiomediastinal silhouette is stable. Bilateral airspace opacities are seen with interval worsening the left base.   There is no pneumothorax or pleural effusion. Bilateral airspace opacities are seen with slight interval worsening. Physical Exam:  Vitals: BP (!) 140/54   Pulse 89   Temp 97.8 °F (36.6 °C) (Oral)   Resp 20   Ht 6' 3\" (1.905 m)   Wt 231 lb (104.8 kg)   SpO2 (!) 88%   BMI 28.87 kg/m²   24 hour intake/output:  No intake or output data in the 24 hours ending 10/17/21 1514  Last 3 weights: Wt Readings from Last 3 Encounters:   10/17/21 231 lb (104.8 kg)   10/10/21 260 lb (117.9 kg)   05/13/21 252 lb 12.8 oz (114.7 kg)       General appearance - Alert, well appearing, and in no distress and overweight  HEENT: Normocephalic, Atraumatic, Conjuctiva pink, PERRL, Oral mucosa normal, Lips, teeth and gums normal, Trachea midline, Thyroid normal and No noted lymphadenopathy  Chest - bibasilar rales. Good air entry bilaterally. Cardiovascular - normal rate, regular rhythm, normal S1, S2, no murmurs, rubs, clicks or gallops  Abdomen - soft, nontender, nondistended, no masses or organomegaly   Neurological - Alert and oriented, Normal speech, No focal findings or movement disorder noted and Motor and sensory grossly normal bilaterally  Integumentary - Skin color, texture, turgor normal. No Rashes or lesions  Musculoskeletal -Full ROM times 4 extremities, No clubbing or cyanosis and No peripheral edema      DVT prophylaxis: [x] Lovenox                                 [] SCDs                                 [] SQ Heparin                                 [] Encourage ambulation           [] Already on Anticoagulation                 ASSESSMENT / PLAN :    Active Problems:    COVID-19    Acute respiratory failure with hypoxia (HCC)    Hyponatremia    Hyperglycemia    Hypokalemia  Resolved Problems:    * No resolved hospital problems.  *    #COVID 19 PNA  Acute Hypoxic resp failure             - Failed outpatient medical therapy with worsening hypoxia             -Chest x-ray with nonspecific pulmonary infiltrate consistent with atypical/viral pneumonia             - Blood culture x 2 pending             -Urine Legionella antigen/Strep antigen neg             -On Decadron 10 mg,Rocephin/doxy and baricitinib.             -Continue Lovenox.             -Wean oxygen as able. -Telemetry and continuous pulse ox             -Droplet plus precaution             -Not a candidate for Remdesevir d/t onset >7 days              -Following inflammatory markers and LFT. Procal trending down- 282 to 37              -Monitor blood sugars on steroids.     #Dehydration/Hyponatremia:   Eating and drinking well. Off iv fluid. Renal function good. #Hypokalemia  Replaced. K 3.7 10/17  Mag 2.5. Hyperglycemia  Sec to steroid  On low dose Lantus  Continue sliding scale and monitor. Dry eyes  Lubricant ordered  benadry x 1 dose.   Ibuprofen prn for headache  Monitor       Electronically signed by Mariel De Jesus MD on 10/17/2021 at 3:14 PM    [facility] Hospitalist

## 2021-10-18 NOTE — PROGRESS NOTES
All morning patient struggled with keeping oxygen levels in the 90's but he was A and O X 4 and was conversing with this nurse appropriatly, he did voice that the oxygen and non rebreather were very restrictive feeling on his face but he understood they were necessary. At aprox 36 thsi nurse was called by telemetry that his oxygen was low and when this nurse entered the room he was on the side of the bed with all oxygen off and trying to put on jeans. He appeared to be very short of breath and incontinent of urine and could not respond to nurses voice, [facility] was called. Dr Luzma Lemos was quickly at bedside as well as RT and other nursing staff  Oxygen was 62%, ativan 2mg given, at 1035 oxygen was 87 and resp 26, labs were drawn and at 1040 oxygen was 94%. Transferred to step down.

## 2021-10-18 NOTE — PROGRESS NOTES
Night RN and Night Charge aware Precedex was not started due to pt not needing after prn Ativan given.

## 2021-10-18 NOTE — PROGRESS NOTES
Pt arrived on floor,skin check flushed face,no open areas noted,reddened buttocks,pt belongings wallet cell phone,shoes ,cell phone ,hat, clothing

## 2021-10-18 NOTE — CARE COORDINATION
Patient confused; has a sitter. Patient is  unable to participate. Will reattempt when patient is able to participate. Alma Rosa Calderon RN     3315 Chart reviewed. Patient is from home; appears independent PTA. He does not have a PCP; will add to AVS for discharge. He has insurance that assists with Rx when needed. CM will follow for any discharge needs. Alma Rosa Calderon RN     .

## 2021-10-18 NOTE — PROGRESS NOTES
ATTENDING PHYSICIAN'S PROGRESS NOTES    Patient:  Yosi Marvin      Unit/Bed:3014/3014-A    YOB: 1955    MRN: [id number]     Acct: [de-identified]     Admit date: 10/14/2021    Patient Seen, Chart, Consults notes, Labs, Radiology studies reviewed. SUBJECTIVE:   Day 4 of stay with Covid-19 and hypoxic resp failure. Seen at bed side. Resp therapist present. Pt on 15l High flow and non rebreather and still with some SOB. Placing on BiPAP and will get ABG. Dry teary,itchy eyes better. All other ROS negative except noted in HPI    Past, Family, Social History unchanged from admission. Diet:  ADULT DIET; Regular  Adult Oral Nutrition Supplement;  Low Calorie/High Protein Oral Supplement    Medications:  Scheduled Meds:   albuterol sulfate HFA  2 puff Inhalation BID    And    ipratropium  2 puff Inhalation BID    insulin glargine  10 Units SubCUTAneous Nightly    zinc sulfate  50 mg Oral Daily    melatonin  3 mg Oral Nightly    ascorbic acid  1,000 mg Oral Daily    baricitinib  4 mg Oral Daily    dexamethasone  10 mg IntraVENous Q24H    insulin lispro  0-12 Units SubCUTAneous TID WC    insulin lispro  0-6 Units SubCUTAneous Nightly    sodium chloride flush  5-40 mL IntraVENous 2 times per day    enoxaparin  30 mg SubCUTAneous BID    cefTRIAXone (ROCEPHIN) IV  1,000 mg IntraVENous Q24H    famotidine  20 mg Oral BID     Continuous Infusions:   dextrose      sodium chloride       PRN Meds:lubrifresh P.M., ibuprofen, glucose, dextrose, glucagon (rDNA), dextrose, sodium chloride flush, sodium chloride, ondansetron **OR** ondansetron, polyethylene glycol, acetaminophen **OR** acetaminophen, guaiFENesin-dextromethorphan, potassium chloride **OR** potassium alternative oral replacement **OR** potassium chloride    OBJECTIVE:  CBC:   Recent Labs     10/17/21  0855   WBC 10.9*   HGB 16.7        BMP:    Recent Labs     10/17/21  0855      K 3.7      CO2 22   BUN 16 CREATININE 0.7*   GLUCOSE 185*     Calcium:  Recent Labs     10/17/21  0855   CALCIUM 9.0     Ionized Calcium:No results for input(s): IONCA in the last 72 hours. Magnesium:  Recent Labs     10/17/21  0855   MG 2.5*     Phosphorus:  Recent Labs     10/17/21  0855   PHOS 3.0     BNP:No results for input(s): BNP in the last 72 hours. Glucose:  Recent Labs     10/17/21  1647 10/17/21  2158 10/18/21  0822   POCGLU 204* 161* 129*     HgbA1C: No results for input(s): LABA1C in the last 72 hours. INR: No results for input(s): INR in the last 72 hours. Hepatic:   Recent Labs     10/17/21  0855   ALKPHOS 88   ALT 95*   AST 72*   PROT 6.5   BILITOT 0.8   BILIDIR 0.3   LABALBU 3.7  3.7     Amylase and Lipase:No results for input(s): LACTA, AMYLASE in the last 72 hours. Lactic Acid: No results for input(s): LACTA in the last 72 hours. Troponin:   No results for input(s): CKTOTAL, CKMB, TROPONINT in the last 72 hours. BNP: No results for input(s): BNP in the last 72 hours. Lipids: No results for input(s): CHOL, TRIG, HDL, LDLCALC in the last 72 hours. Invalid input(s): LDL  ABGs: No results found for: PH, PCO2, PO2, HCO3, O2SAT    Radiology reports as per the Radiologist  Radiology:   XR CHEST PORTABLE  Result Date: 10/14/2021  EXAMINATION: ONE XRAY VIEW OF THE CHEST 10/14/2021 6:06 am COMPARISON: Chest radiograph dated October 10, 2021 HISTORY: ORDERING SYSTEM PROVIDED HISTORY: chest pain TECHNOLOGIST PROVIDED HISTORY: Reason for exam:->chest pain Reason for Exam: chest pain Acuity: Acute Type of Exam: Initial Mechanism of Injury: chest pain Relevant Medical/Surgical History: chest pain FINDINGS: Cardiomediastinal silhouette is stable. Bilateral airspace opacities are seen with interval worsening the left base. There is no pneumothorax or pleural effusion. Bilateral airspace opacities are seen with slight interval worsening.         Physical Exam:  Vitals: BP (!) 155/89   Pulse 81   Temp 98.2 °F (36.8 °C) Resp 20   Ht 6' 3\" (1.905 m)   Wt 231 lb (104.8 kg)   SpO2 93%   BMI 28.87 kg/m²   24 hour intake/output:  No intake or output data in the 24 hours ending 10/18/21 0939  Last 3 weights: Wt Readings from Last 3 Encounters:   10/17/21 231 lb (104.8 kg)   10/10/21 260 lb (117.9 kg)   05/13/21 252 lb 12.8 oz (114.7 kg)       General appearance - Alert, well appearing, and in no distress but mild resp distress, overweight. HEENT: Normocephalic, Atraumatic, Conjuctiva pink, PERRL, Oral mucosa normal, Lips, teeth and gums normal, Trachea midline, Thyroid normal and No noted lymphadenopathy  Chest - Diminished breath sounds bilaterally,no wheeze  Cardiovascular - normal rate, regular rhythm, normal S1, S2, no murmurs, rubs, clicks or gallops  Abdomen - soft, nontender, nondistended, no masses or organomegaly   Neurological - Alert and oriented, Normal speech, No focal findings or movement disorder noted and Motor and sensory grossly normal bilaterally  Integumentary - Skin color, texture, turgor normal. No Rashes or lesions  Musculoskeletal -Full ROM times 4 extremities, No clubbing or cyanosis and No peripheral edema      DVT prophylaxis: [x] Lovenox                                 [] SCDs                                 [] SQ Heparin                                 [] Encourage ambulation           [] Already on Anticoagulation                 ASSESSMENT / PLAN :    Active Problems:    COVID-19    Acute respiratory failure with hypoxia (HCC)    Hyponatremia    Hyperglycemia    Hypokalemia  Resolved Problems:    * No resolved hospital problems. *    #COVID 19 PNA  Acute Hypoxic resp failure             - Failed outpatient medical therapy with worsening hypoxia             -Chest x-ray 10/14 with worsened infiltrates. - Blood culture x 2 10/14 pending             -Urine Legionella antigen/Strep antigen neg             -On Decadron 10 mg,Rocephin/doxy and baricitinib.             -Continue Lovenox. -Pulm consult              -Transfer to step down for close monitoring              -Check ABG             -Wean oxygen as able. -Telemetry and continuous pulse ox             -Droplet plus precaution             -Not a candidate for Remdesevir d/t onset >7 days              -Following inflammatory markers and LFT. Procal trending down- 282 to 37               -Procal 1.101. Check D dimer again             -Monitor blood sugars on steroids.     #Dehydration/Hyponatremia:   Eating and drinking well. Off iv fluid. Renal function good. #Hypokalemia  Replaced. K 3.7 10/17  Mag 2.5. Hyperglycemia  Sec to steroid  -Improved. On low dose Lantus  Continue sliding scale and monitor. Dry eyes  Lubricant ordered  benadry x 1 dose. Ibuprofen prn for headache  Monitor       ADDENDUM: 2.25pm  This morning after I saw pt,he became delirious and was removing his clothes and oxygen and attempting to wear his street cloths and get out of bed. Dropped his O2 sat to the 60's off oxygen  Rapid response called and pt was given ativan 2 mg iv and started back on oxygen  Was transferred to step down  He continued to be delirious and did not respond to ativan and soft restraints. RN talked with wife and she said pt takes about 6 beers daily. Plan: Alcohol withdrawal with delirium  Started on precedex drip  Continue other treatment  Continue to monitor.     Electronically signed by Margaret Stokes MD on 10/18/2021 at 9:39 AM    Rounding Hospitalist

## 2021-10-18 NOTE — DISCHARGE INSTR - COC
Continuity of Care Form    Patient Name: Blanca Hannon   :  1955  MRN:  [id number]    Admit date:  10/14/2021  Discharge date:  ***    Code Status Order: Full Code   Advance Directives:     Admitting Physician:  Angelita Jones MD  PCP: No primary care provider on file. Discharging Nurse: [facility] Unit/Room#: 7973/3727-C  Discharging Unit Phone Number: ***    Emergency Contact:   Extended Emergency Contact Information  Primary Emergency Contact: Elise Hampton  Address: [address] [place] [address] Phone: [phone]  Relation: Spouse  Secondary Emergency Contact: Sam Polk Phone: [phone]  Relation: Child    Past Surgical History:  Past Surgical History:   Procedure Laterality Date    APPENDECTOMY      COLONOSCOPY  3-25-13    polyp,s x 5    HERNIA REPAIR         Immunization History: There is no immunization history on file for this patient. Active Problems:  Patient Active Problem List   Diagnosis Code    COVID-19 U07.1    Acute respiratory failure with hypoxia (HCC) J96.01    Hyponatremia E87.1    Hyperglycemia R73.9    Hypokalemia E87.6       Isolation/Infection:   Isolation          Droplet Plus        Patient Infection Status     Infection Onset Added Last Indicated Last Indicated By Review Planned Expiration Resolved Resolved By    COVID-19  10/15/21 10/15/21 Dedrick Gutierrez RN 10/22/21 10/29/21      Pt states positive, no test on file.  Waiting for confirmation          Nurse Assessment:  Last Vital Signs: /66   Pulse 58   Temp 98.7 °F (37.1 °C) (Oral)   Resp 25   Ht 6' 3\" (1.905 m)   Wt 231 lb (104.8 kg)   SpO2 93%   BMI 28.87 kg/m²     Last documented pain score (0-10 scale): Pain Level: 0  Last Weight:   Wt Readings from Last 1 Encounters:   10/17/21 231 lb (104.8 kg)     Mental Status:  {IP PT MENTAL STATUS:}    IV Access:  { TERESA IV ACCESS:636897475}    Nursing Mobility/ADLs:  Walking   {CHP DME SJFA:937536676}  Transfer  {CHP DME XJEI:468110596}  Bathing  {CHP DME SLLY:952468895}  Dressing  {CHP DME NQYJ:195290674}  Toileting  {CHP DME EUAC:403092321}  Feeding  {CHP DME DCJJ:739912712}  Med Admin  {CHP DME YLUT:440037351}  Med Delivery   {[facility] MED Delivery:633608458}    Wound Care Documentation and Therapy:        Elimination:  Continence:   · Bowel: {YES / VM:50062}  · Bladder: {YES / QH:97596}  Urinary Catheter: {Urinary Catheter:960936330}   Colostomy/Ileostomy/Ileal Conduit: {YES / TU:17025}       Date of Last BM: ***  No intake or output data in the 24 hours ending 10/18/21 1451  No intake/output data recorded.     Safety Concerns:     [address] Safety Concerns:319101677}    Impairments/Disabilities:      [address] [employer] Impairments/Disabilities:821146413}    Nutrition Therapy:  Current Nutrition Therapy:   [address] [employer] Diet List:709711338}    Routes of Feeding: {[facility] DME Other Feedings:294490744}  Liquids: {Slp liquid thickness:18521}  Daily Fluid Restriction: {CHP DME Yes amt example:789868788}  Last Modified Barium Swallow with Video (Video Swallowing Test): {Done Not Done UPEF:618671769}    Treatments at the Time of Hospital Discharge:   Respiratory Treatments: ***  Oxygen Therapy:  {Therapy; copd oxygen:12525}  Ventilator:    {[facility] Vent YHCQ:538866630}    Rehab Therapies: {THERAPEUTIC INTERVENTION:3951910175}  Weight Bearing Status/Restrictions: [facility] Weight Bearin}  Other Medical Equipment (for information only, NOT a DME order):  {EQUIPMENT:899763170}  Other Treatments: ***    Patient's personal belongings (please select all that are sent with patient):  {[facility] DME Belongings:605398264}    RN SIGNATURE:  {Esignature:135875163}    CASE MANAGEMENT/SOCIAL WORK SECTION    Inpatient Status Date: ***    Readmission Risk Assessment Score:  Readmission Risk              Risk of Unplanned Readmission:  12           Discharging to Facility/ Agency   · Name: · Address:  · Phone:  · Fax:    Dialysis Facility (if applicable)   · Name:  · Address:  · Dialysis Schedule:  · Phone:  · Fax:    / signature: {Esignature:272912202}    PHYSICIAN SECTION    Prognosis: {Prognosis:0495881075}    Condition at Discharge: Orly Barajas Patient Condition:263577918}    Rehab Potential (if transferring to Rehab): {Prognosis:5551885730}    Recommended Labs or Other Treatments After Discharge: ***    Physician Certification: I certify the above information and transfer of Tanya Mtz  is necessary for the continuing treatment of the diagnosis listed and that he requires {Admit to Appropriate Level of Care:96628} for {GREATER/LESS:384998268} 30 days.      Update Admission H&P: {CHP DME Changes in ASUVX:772908253}    PHYSICIAN SIGNATURE:  {Esignature:908691917}

## 2021-10-18 NOTE — PROGRESS NOTES
10/18/21 0820   Oxygen Therapy/Pulse Ox   O2 Therapy Oxygen   $Oxygen $Daily Charge   O2 Device High flow nasal cannula   O2 Flow Rate (L/min) 15 L/min  (with nonrebreather mask)   FiO2  100 %   Resp 23   SpO2 95 %   Pulse Oximeter Device Mode Continuous   Pulse Oximeter Device Location Finger   $Pulse Oximeter $Spot check (multiple/continuous)   Blood Gas  Performed?  No

## 2021-10-18 NOTE — PROGRESS NOTES
Dr notified pt arrived to rm 2013 . pt confused pulling o2 off constantly,unable to be verbally redirected,pt also incont,needs order for gonzalez and restraints and has no prns at this time,please and thank you

## 2021-10-19 PROBLEM — U07.1 COVID-19: Status: RESOLVED | Noted: 2021-01-01 | Resolved: 2021-01-01

## 2021-10-19 PROBLEM — F10.939 ALCOHOL WITHDRAWAL (HCC): Status: ACTIVE | Noted: 2021-01-01

## 2021-10-19 PROBLEM — J12.82 PNEUMONIA DUE TO COVID-19 VIRUS: Status: ACTIVE | Noted: 2021-01-01

## 2021-10-19 NOTE — PROGRESS NOTES
pulmonary      SUBJECTIVE:  **requiring ativan for agitation     OBJECTIVE    VITALS:  BP (!) 176/80   Pulse 72   Temp 98.7 °F (37.1 °C) (Axillary)   Resp 22   Ht 6' 3\" (1.905 m)   Wt 231 lb (104.8 kg)   SpO2 93%   BMI 28.87 kg/m²   HEAD AND FACE EXAM:  No throat injection, no active exudate,no thrush  NECK EXAM;No JVD, no masses, symmetrical  CHEST EXAM; Expansion equal and symmetrical, no masses  LUNG EXAM; Good breath sounds bilaterally. There are expiratory wheezes both lungs, there are crackles at both lung bases  CARDIOVASCULAR EXAM: Positive S1 and S2, no S3 or S4, no clicks ,no murmurs  RIGHT AND LEFT LOWER EXTRIMITY EXAM: No edema, no swelling, no inflamation            LABS   Lab Results   Component Value Date    WBC 13.7 (H) 10/18/2021    HGB 17.0 10/18/2021    HCT 49.9 10/18/2021    MCV 92.1 10/18/2021     10/18/2021     Lab Results   Component Value Date    CREATININE 0.8 (L) 10/18/2021    BUN 17 10/18/2021     10/18/2021    K 3.7 10/18/2021     10/18/2021    CO2 18 (L) 10/18/2021     No results found for: INR, PROTIME       Lab Results   Component Value Date    PHOS 3.0 10/18/2021    PHOS 3.0 10/17/2021        Recent Labs     10/18/21  0945   PH 7.45   PO2ART 68*   GGO6XKA 27.0*   O2SAT 93.1*         Wt Readings from Last 3 Encounters:   10/17/21 231 lb (104.8 kg)   10/10/21 260 lb (117.9 kg)   05/13/21 252 lb 12.8 oz (114.7 kg)               ASSESMENT  Ac resp failure  covid pneumonia  Bact pneumonia        PLAN  1. antibx  2. On decadron  3. Cont baricitinab  4. Cont o2.  Heading towards intubation and vent    10/19/2021  Ro Epstein MD, M.D.

## 2021-10-19 NOTE — PROGRESS NOTES
10/19/21 1606   NIV Type   NIV Started/Stopped On   Equipment Type v60   Mode Bilevel   Mask Type Full face mask   Settings/Measurements   IPAP 15 cmH20   CPAP/EPAP 7 cmH2O   Rate Ordered 12   Resp 29   FiO2  100 %   I Time/ I Time % 1.1 s   Vt Exhaled 1241 mL   Minute Volume 35 Liters   Mask Leak (lpm) 0 lpm   Comfort Level Good   Using Accessory Muscles No   SpO2 94   Pt on 15/7 at 100% - Sats are 94%. Sitter in room.

## 2021-10-19 NOTE — PLAN OF CARE
Problem: Falls - Risk of:  Goal: Will remain free from falls  Description: Will remain free from falls  Outcome: Ongoing  Goal: Absence of physical injury  Description: Absence of physical injury  Outcome: Ongoing     Problem: Airway Clearance - Ineffective  Goal: Achieve or maintain patent airway  Outcome: Ongoing     Problem: Gas Exchange - Impaired  Goal: Absence of hypoxia  Outcome: Ongoing  Goal: Promote optimal lung function  Outcome: Ongoing     Problem: Breathing Pattern - Ineffective  Goal: Ability to achieve and maintain a regular respiratory rate  Outcome: Ongoing     Problem:  Body Temperature -  Risk of, Imbalanced  Goal: Ability to maintain a body temperature within defined limits  Outcome: Ongoing  Goal: Will regain or maintain usual level of consciousness  Outcome: Ongoing  Goal: Complications related to the disease process, condition or treatment will be avoided or minimized  Outcome: Ongoing     Problem: Isolation Precautions - Risk of Spread of Infection  Goal: Prevent transmission of infection  Outcome: Ongoing     Problem: Risk for Fluid Volume Deficit  Goal: Maintain normal heart rhythm  Outcome: Ongoing  Goal: Maintain absence of muscle cramping  Outcome: Ongoing  Goal: Maintain normal serum potassium, sodium, calcium, phosphorus, and pH  Outcome: Ongoing     Problem: Loneliness or Risk for Loneliness  Goal: Demonstrate positive use of time alone when socialization is not possible  Outcome: Ongoing

## 2021-10-19 NOTE — PROGRESS NOTES
Hospitalist Progress Note      PCP: No primary care provider on file. Date of Admission: 10/14/2021    LOS: 5    Chief Complaint:   Chief Complaint   Patient presents with    Shortness of Breath     dx with covid 4 days ago        Case Summary:   35-year-old gentleman with history of hypertension, hyperlipidemia who presented with worsening cough and associated shortness of breath, fevers with chills, nausea vomiting, diarrhea, poor appetite, generalized malaise weakness found to have [id number] pneumonia complicated by acute respiratory failure with hypoxia, hyponat electrolyte abnormalities with hyponatremia and hypokalemia. Admission course was complicated by alcohol withdrawals remia      Active Hospital Problems    Diagnosis Date Noted    Alcohol withdrawal ([facility] [F10.239] 10/19/2021    Acute respiratory failure with hypoxia ([facility] [J96.01] 10/16/2021    Hyponatremia [E87.1] 10/16/2021    Hyperglycemia [R73.9] 10/16/2021    Hypokalemia [E87.6] 10/16/2021    Pneumonia due to COVID-19 virus [U07.1, J12.82] 10/14/2021         Principal Problem:    Pneumonia due to COVID-19 virus and Acute respiratory failure with hypoxia: Remains on BiPAP. Somnolent at the time of evaluation.  -Continue dexamethasone  -Baricitinib  -O2 supplementation with target sats greater than 90%  -Encourage self proning    Active Problems:    Hyperglycemia: Due to steroids. Will monitor on sliding scale insulin glycemic control    Alcohol withdrawal ([facility]):  In the setting of alcohol abuse.  -[place] CIWA protocol  -As needed Ativan based on CIWA  -Vitamins including thiamine, multivitamins  -We will assess need for phenobarbital.  Precedex was going to be initiated yesterday, however the patient's bradycardia precluded the use of Precedex    Resolved Problems:    Hypokalemia      Hyponatremia        Medications:  Reviewed  Infusion Medications    sodium chloride      dexmedetomidine (PRECEDEX) IV infusion Stopped (10/18/21 1940)    dextrose      sodium chloride Stopped (10/18/21 1048)     Scheduled Medications    [START ON 10/20/2021] dexamethasone  10 mg IntraVENous Q24H    sodium chloride flush  5-40 mL IntraVENous 2 times per day    thiamine  100 mg Oral Daily    multivitamin  1 tablet Oral Daily    piperacillin-tazobactam  3,375 mg IntraVENous Q8H    albuterol sulfate HFA  2 puff Inhalation BID    And    ipratropium  2 puff Inhalation BID    insulin glargine  10 Units SubCUTAneous Nightly    zinc sulfate  50 mg Oral Daily    melatonin  3 mg Oral Nightly    ascorbic acid  1,000 mg Oral Daily    baricitinib  4 mg Oral Daily    insulin lispro  0-12 Units SubCUTAneous TID WC    insulin lispro  0-6 Units SubCUTAneous Nightly    sodium chloride flush  5-40 mL IntraVENous 2 times per day    enoxaparin  30 mg SubCUTAneous BID    famotidine  20 mg Oral BID     PRN Meds: sodium chloride flush, sodium chloride, LORazepam **OR** LORazepam **OR** LORazepam **OR** LORazepam **OR** LORazepam **OR** LORazepam **OR** LORazepam **OR** LORazepam, lubrifresh P.M., ibuprofen, glucose, dextrose, glucagon (rDNA), dextrose, sodium chloride flush, sodium chloride, ondansetron **OR** ondansetron, polyethylene glycol, acetaminophen **OR** acetaminophen, guaiFENesin-dextromethorphan, potassium chloride **OR** potassium alternative oral replacement **OR** potassium chloride      DVT Prophylaxis: Subcut enoxaparin  Diet: ADULT DIET; Regular  Adult Oral Nutrition Supplement; Low Calorie/High Protein Oral Supplement  Code Status: Full Code    Dispo:  Anticipate discharge in the next 7hrs    ____________________________________________________________________________    Subjective:   Overnight Events:   Uneventful overnight  Still with some agitation and pulling BiPAP off      Physical Exam:  BP (!) 164/79   Pulse 65   Temp 99 °F (37.2 °C) (Axillary)   Resp (!) 32   Ht 6' 3\" (1.905 m)   Wt 231 lb (104.8 kg)   SpO2 (!) 89%   BMI 28.87 kg/m²   General appearance: No apparent distress, appears stated age and cooperative. HEENT: Normocephalic, atraumatic, MMM, No sclera icterus/conjuctival palor  Neck: Supple, no thyromegally. No jugular venous distention. Respiratory:  Normal respiratory effort. Clear to auscultation, no Rales/Wheezes/Rhonchi. Cardiovascular: S1/S2 without murmurs, rubs or gallops. RRR  Abdomen: Soft, non-tender, non-distended, bowel sounds present. Musculoskeletal: No clubbing, cyanosis or edema bilaterally. Skin: Skin color, texture, turgor normal.  No rashes or lesions. Neurologic:  Cranial nerves: II-XII intact, CHRISTI, No focal sensory/motor deficits  Psychiatric: Alert and oriented, thought content appropriate  Capillary Refill: Brisk,< 3 seconds   Peripheral Pulses: +2 palpable, equal bilaterally       Intake/Output Summary (Last 24 hours) at 10/19/2021 1231  Last data filed at 10/18/2021 1939  Gross per 24 hour   Intake 275.42 ml   Output 500 ml   Net -224.58 ml       Labs:   Recent Labs     10/17/21  0855 10/18/21  1049   WBC 10.9* 13.7*   HGB 16.7 17.0   HCT 49.3 49.9    346      Recent Labs     10/17/21  0855 10/18/21  1049    137   K 3.7 3.7    104   CO2 22 18*   BUN 16 17   CREATININE 0.7* 0.8*   CALCIUM 9.0 8.8   PHOS 3.0 3.0   AST 72* 85*   ALT 95* 132*   BILIDIR 0.3  --    BILITOT 0.8 0.8   ALKPHOS 88 98     No results for input(s): Scotty Shackle in the last 72 hours. Urinalysis:  No results found for: Christi Rohan, BACTERIA, RBCUA, BLOODU, SPECGRAV, Heri [place] [address]    Radiology:  XR CHEST PORTABLE   Final Result   Multifocal infiltrates throughout the lungs bilaterally, mildly improved. XR CHEST PORTABLE   Final Result   Bilateral airspace opacities are seen with slight interval worsening. Samira Breaux MD      Please excuse brevity and/or typos. This report was transcribed using voice recognition software.  Every effort was made to ensure accuracy, however, inadvertent computerized transcription errors may be present.

## 2021-10-20 NOTE — PROGRESS NOTES
10/20/21 0523   NIV Type   NIV Started/Stopped On   Equipment Type v60   Mode Bilevel   Mask Type Full face mask   Mask Size Large   Bonnet size Large   Settings/Measurements   IPAP 15 cmH20   CPAP/EPAP 7 cmH2O   Rate Ordered 12   Resp 22   FiO2  100 %   I Time/ I Time % 1.1 s   Vt Exhaled 913 mL   Minute Volume 21.5 Liters   Mask Leak (lpm) 72 lpm   Comfort Level Good   Using Accessory Muscles No   SpO2 90   Patient Observation   Observations Pt is sleeping   Alarm Settings   Alarms On Y   Press Low Alarm 3 cmH2O   High Pressure Alarm 35 cmH2O   Delay Alarm 20 sec(s)   Apnea (secs) 20 secs   Resp Rate Low Alarm 13   High Respiratory Rate 45 br/min

## 2021-10-20 NOTE — PROGRESS NOTES
Hospitalist Progress Note      PCP: No primary care provider on file. Date of Admission: 10/14/2021    LOS: 6    Chief Complaint:   Chief Complaint   Patient presents with    Shortness of Breath     dx with covid 4 days ago        Case Summary:   49-year-old gentleman with history of hypertension, hyperlipidemia who presented with worsening cough, shortness of breath, fevers with chills, nausea vomiting, diarrhea, poor appetite, generalized malaise and weakness found to have COVID-19 pneumonia and acute respiratory failure with hypoxia complicated by electrolyte abnormalities with hyponatremia, hypokalemia as well as alcohol withdrawal syndrome. Active Hospital Problems    Diagnosis Date Noted    Alcohol withdrawal ([facility] [address] [F10.239] 10/19/2021    Acute respiratory failure with hypoxia ([address] [J96.01] 10/16/2021    Hyponatremia [E87.1] 10/16/2021    Hyperglycemia [R73.9] 10/16/2021    Hypokalemia [E87.6] 10/16/2021    Pneumonia due to COVID-19 virus [U07.1, J12.82] 10/14/2021         Principal Problem:    Pneumonia due to COVID-19 virus and Acute respiratory failure with hypoxia: Remains on BiPAP. Somnolent. Localizes pain to sternal sternal rub only with the left hand. Would not open eyes to calling name. He has not really interacted with any of the staff. I wonder if he may have had a CVA. -Continue dexamethasone  -Continue baricitinib  -Continue O2 supplementation with target sats greater than 90% on BiPAP  -We will encourage self proning  -Incentive spirometry  -Get CT brain to rule out intracranial pathology  -Continue empiric antibiotics for presumed superimposed pneumonia  -We will check procalcitonin in the morning, if negative or low to consider discontinue antibiotics    Alcohol withdrawal syndrome: In the setting of alcohol abuse. Patient was started on CIWA protocol yesterday with as needed lorazepam.  He remains somnolent this morning. Has been pulling on his BiPAP.   He has been getting lorazepam as needed for this. He is only using the left side. I wonder if he may have right-sided weakness. This will raise suspicion of CVA. -Get CT brain to rule out intracranial pathology  -Continue as needed lorazepam for now. Depending on CT scan, will consider switching to phenobarbital      Active Problems:    Hyperglycemia: In the setting of steroid use.   Continue sliding scale insulin    Resolved problems    Hypokalemia    Hyponatremia      Medications:  Reviewed  Infusion Medications    sodium chloride      dexmedetomidine (PRECEDEX) IV infusion Stopped (10/18/21 1940)    dextrose      sodium chloride Stopped (10/18/21 1048)     Scheduled Medications    dexamethasone  10 mg IntraVENous Q24H    sodium chloride flush  5-40 mL IntraVENous 2 times per day    thiamine  100 mg Oral Daily    multivitamin  1 tablet Oral Daily    famotidine (PEPCID) injection  20 mg IntraVENous BID    piperacillin-tazobactam  3,375 mg IntraVENous Q8H    albuterol sulfate HFA  2 puff Inhalation BID    And    ipratropium  2 puff Inhalation BID    insulin glargine  10 Units SubCUTAneous Nightly    zinc sulfate  50 mg Oral Daily    ascorbic acid  1,000 mg Oral Daily    baricitinib  4 mg Oral Daily    insulin lispro  0-12 Units SubCUTAneous TID WC    insulin lispro  0-6 Units SubCUTAneous Nightly    sodium chloride flush  5-40 mL IntraVENous 2 times per day    enoxaparin  30 mg SubCUTAneous BID     PRN Meds: sodium chloride flush, sodium chloride, LORazepam **OR** LORazepam **OR** LORazepam **OR** LORazepam **OR** LORazepam **OR** LORazepam **OR** LORazepam **OR** LORazepam, lubrifresh P.M., glucose, dextrose, glucagon (rDNA), dextrose, sodium chloride flush, sodium chloride, ondansetron **OR** ondansetron, polyethylene glycol, acetaminophen **OR** acetaminophen, guaiFENesin-dextromethorphan, potassium chloride **OR** potassium alternative oral replacement **OR** potassium chloride      DVT Prophylaxis: Subcut enoxaparin  Diet: ADULT DIET; Regular  Adult Oral Nutrition Supplement; Low Calorie/High Protein Oral Supplement  Code Status: Full Code    Dispo: Anticipate discharge in the next 5 days    ____________________________________________________________________________    Subjective:   Overnight Events:   Uneventful overnight  Remains somnolent and poorly responsive  Localizes sternal rub      Physical Exam:  BP (!) 153/81   Pulse 73   Temp 98.9 °F (37.2 °C) (Axillary)   Resp 28   Ht 6' 3\" (1.905 m)   Wt 231 lb (104.8 kg)   SpO2 (!) 88%   BMI 28.87 kg/m²   General appearance: No apparent distress, appears stated age and cooperative. HEENT: Normocephalic, atraumatic, MMM, No sclera icterus/conjuctival palor  Neck: Supple, no thyromegally. No jugular venous distention. Respiratory:  Normal respiratory effort. Clear to auscultation, no Rales/Wheezes/Rhonchi. Cardiovascular: S1/S2 without murmurs, rubs or gallops. RRR  Abdomen: Soft, non-tender, non-distended, bowel sounds present. Musculoskeletal: No clubbing, cyanosis or edema bilaterally. Skin: Skin color, texture, turgor normal.  No rashes or lesions.   Neurologic: Somnolent, localizes pain, no purposeful movement      Intake/Output Summary (Last 24 hours) at 10/20/2021 1337  Last data filed at 10/20/2021 0600  Gross per 24 hour   Intake --   Output 1090 ml   Net -1090 ml       Labs:   Recent Labs     10/18/21  1049 10/19/21  1119 10/20/21  1108   WBC 13.7* 11.8* 15.4*   HGB 17.0 16.9 18.0   HCT 49.9 49.8 54.0*    251 250      Recent Labs     10/18/21  1049 10/19/21  1119 10/20/21  1108    139 144   K 3.7 4.1 4.6    107 111*   CO2 18* 20* 20*   BUN 17 23 25*   CREATININE 0.8* 0.7* 0.7*   CALCIUM 8.8 8.6 8.9   PHOS 3.0 3.5 3.7   AST 85* 79* 60*   * 143* 131*   BILIDIR  --  0.4* 0.4*   BILITOT 0.8 0.9 1.0   ALKPHOS 98 89 94       Radiology:  XR CHEST PORTABLE   Final Result   Multifocal infiltrates throughout the lungs bilaterally, mildly improved. XR CHEST PORTABLE   Final Result   Bilateral airspace opacities are seen with slight interval worsening. Samira Alvarado MD      Please excuse brevity and/or typos. This report was transcribed using voice recognition software. Every effort was made to ensure accuracy, however, inadvertent computerized transcription errors may be present.

## 2021-10-20 NOTE — PROGRESS NOTES
pulmonary      SUBJECTIVE: he remains on high fio2     OBJECTIVE    VITALS:  BP (!) 153/81   Pulse 73   Temp 98.9 °F (37.2 °C) (Axillary)   Resp 28   Ht 6' 3\" (1.905 m)   Wt 231 lb (104.8 kg)   SpO2 (!) 88%   BMI 28.87 kg/m²   HEAD AND FACE EXAM:  No throat injection, no active exudate,no thrush  NECK EXAM;No JVD, no masses, symmetrical  CHEST EXAM; Expansion equal and symmetrical, no masses  LUNG EXAM; Good breath sounds bilaterally. There are expiratory wheezes both lungs, there are crackles at both lung bases  CARDIOVASCULAR EXAM: Positive S1 and S2, no S3 or S4, no clicks ,no murmurs  RIGHT AND LEFT LOWER EXTRIMITY EXAM: No edema, no swelling, no inflamation            LABS   Lab Results   Component Value Date    WBC 15.4 (H) 10/20/2021    HGB 18.0 10/20/2021    HCT 54.0 (H) 10/20/2021    MCV 92.6 10/20/2021     10/20/2021     Lab Results   Component Value Date    CREATININE 0.7 (L) 10/20/2021    BUN 25 (H) 10/20/2021     10/20/2021    K 4.6 10/20/2021     (H) 10/20/2021    CO2 20 (L) 10/20/2021     No results found for: INR, PROTIME       Lab Results   Component Value Date    PHOS 3.7 10/20/2021    PHOS 3.5 10/19/2021    PHOS 3.0 10/18/2021        Recent Labs     10/18/21  0945   PH 7.45   PO2ART 68*   UPF4CPL 27.0*   O2SAT 93.1*         Wt Readings from Last 3 Encounters:   10/17/21 231 lb (104.8 kg)   10/10/21 260 lb (117.9 kg)   05/13/21 252 lb 12.8 oz (114.7 kg)               ASSESMENT  Ac resp failure  covid pneumonia  Bact pneumonia        PLAN  1. cpm  2. Cont fio2  3.  Observe closely as katelyn need vent support    10/20/2021  Yuli Haider MD, MNALLELY.

## 2021-10-21 PROBLEM — D72.829 LEUKOCYTOSIS: Status: ACTIVE | Noted: 2021-01-01

## 2021-10-21 PROBLEM — I63.512 ACUTE ISCHEMIC LEFT MCA STROKE (HCC): Status: ACTIVE | Noted: 2021-01-01

## 2021-10-21 PROBLEM — G81.01 RIGHT FLACCID HEMIPLEGIA (HCC): Status: ACTIVE | Noted: 2021-01-01

## 2021-10-21 PROBLEM — E87.1 HYPONATREMIA: Status: RESOLVED | Noted: 2021-01-01 | Resolved: 2021-01-01

## 2021-10-21 PROBLEM — E87.6 HYPOKALEMIA: Status: RESOLVED | Noted: 2021-01-01 | Resolved: 2021-01-01

## 2021-10-21 NOTE — CONSULTS
Neurology Service Consult Note  [facility]  Patient Name: Kandace Cruz  : 1955        Subjective:   Reason for consult: Right sided weakness  Patient seen and examined. Chart reviewed in detail. 77 y.o. -male with PMH of htn,hld, on ciwa for alcohol withdraw presenting to [facility] with cough, shortness of breath, fever , chills and n/v/d. Patient tested positive on 10/14/21 for Covid. Patient is on continous Bipap. Ed workup consistent for hypoxia, hypokalemia and hyponatremia. Patient is on CIWA for alcohol withdraw and has a sitter at bedside. Neurology being consulted for right sided weakness present since Monday. On exam patient seen on Covid unit in droplet precautions. PPE worn per protocol. Information obtained completely from medical records and nursing staff, as patient is not following commands, appears anxiously waxing and waning in bed. Mentation is altered and appears to have worsened per nursing staff. Patient has been started on aspirin and Lipitor however patient is unable to ingest p.o. medications. At this time aspirin is ordered suppository. MRI brain cannot be completed at this time as patient's respiratory status is extremely altered, if patient is able to come off of BiPAP we will reevaluate.          Past Medical History:   Diagnosis Date    Hyperlipidemia     Hypertension     :   Past Surgical History:   Procedure Laterality Date    APPENDECTOMY      COLONOSCOPY  3-25-13    polyp,s x 5    HERNIA REPAIR       Medications:  Scheduled Meds:   atorvastatin  80 mg Oral Nightly    aspirin  300 mg Rectal Daily    dexamethasone  10 mg IntraVENous Q24H    sodium chloride flush  5-40 mL IntraVENous 2 times per day    thiamine  100 mg Oral Daily    multivitamin  1 tablet Oral Daily    famotidine (PEPCID) injection  20 mg IntraVENous BID    piperacillin-tazobactam  3,375 mg IntraVENous Q8H    albuterol sulfate HFA  2 puff Inhalation BID    And    ipratropium  2 puff Inhalation BID    insulin glargine  10 Units SubCUTAneous Nightly    zinc sulfate  50 mg Oral Daily    ascorbic acid  1,000 mg Oral Daily    baricitinib  4 mg Oral Daily    insulin lispro  0-12 Units SubCUTAneous TID WC    insulin lispro  0-6 Units SubCUTAneous Nightly    sodium chloride flush  5-40 mL IntraVENous 2 times per day    enoxaparin  30 mg SubCUTAneous BID     Continuous Infusions:   sodium chloride      dexmedetomidine (PRECEDEX) IV infusion Stopped (10/18/21 1940)    dextrose      sodium chloride 100 mL/hr at 10/21/21 0553     PRN Meds:.ondansetron **OR** ondansetron, polyethylene glycol, sodium chloride flush, sodium chloride, LORazepam **OR** LORazepam **OR** LORazepam **OR** LORazepam **OR** LORazepam **OR** LORazepam **OR** LORazepam **OR** LORazepam, lubrifresh P.M., glucose, dextrose, glucagon (rDNA), dextrose, sodium chloride flush, sodium chloride, acetaminophen **OR** acetaminophen, guaiFENesin-dextromethorphan, potassium chloride **OR** potassium alternative oral replacement **OR** potassium chloride    No Known Allergies  Social History     Socioeconomic History    Marital status:      Spouse name: Not on file    Number of children: Not on file    Years of education: Not on file    Highest education level: Not on file   Occupational History    Not on file   Tobacco Use    Smoking status: Former Smoker     Packs/day: 1.00     Years: 30.00     Pack years: 30.00     Start date: 1991    Smokeless tobacco: Never Used   Substance and Sexual Activity    Alcohol use: Yes     Comment: occasional drinker    Drug use: No    Sexual activity: Not on file   Other Topics Concern    Not on file   Social History Narrative    Not on file     Social Determinants of Health     Financial Resource Strain:     Difficulty of Paying Living Expenses:    Food Insecurity:     Worried About Running Out of Food in the Last Year:     Alfonso Out of Food in the Last Year:    Transportation Needs:     Lack of Transportation (Medical):  Lack of Transportation (Non-Medical):    Physical Activity:     Days of Exercise per Week:     Minutes of Exercise per Session:    Stress:     Feeling of Stress :    Social Connections:     Frequency of Communication with Friends and Family:     Frequency of Social Gatherings with Friends and Family:     Attends Mandaen Services:     Active Member of Clubs or Organizations:     Attends Club or Organization Meetings:     Marital Status:    Intimate Partner Violence:     Fear of Current or Ex-Partner:     Emotionally Abused:     Physically Abused:     Sexually Abused:       History reviewed. No pertinent family history. ROS (10 systems)   unable to obtain due to AMS    Physical Exam:       Vitals:    10/21/21 0359 10/21/21 0400 10/21/21 0500 10/21/21 0700   BP:  (!) 159/77     Pulse:  86 90 93   Resp: 20 22     Temp:  100.2 °F (37.9 °C)     TempSrc:  Rectal     SpO2: 90% 90%     Weight:       Height:            Wt Readings from Last 3 Encounters:   10/17/21 231 lb (104.8 kg)   10/10/21 260 lb (117.9 kg)   05/13/21 252 lb 12.8 oz (114.7 kg)     Temp Readings from Last 3 Encounters:   10/21/21 100.2 °F (37.9 °C) (Rectal)   10/10/21 99.3 °F (37.4 °C) (Oral)   05/13/21 96.9 °F (36.1 °C) (Infrared)     BP Readings from Last 3 Encounters:   10/21/21 (!) 159/77   10/10/21 (!) 145/77   05/13/21 126/80     Pulse Readings from Last 3 Encounters:   10/21/21 93   10/10/21 82   05/13/21 91        Gen: Alert, does not follow commands, nonverbal  HEENT: NC/AT, EOMI, PERRL, mmm, neck supple, no meningeal signs; Heart: Sinus tachycardia  Lungs:  On BiPAP   Ext: no edema, no calf tenderness b/l  Psych: Appears anxious  Skin: no rashes or lesions    NEUROLOGIC EXAM:    Mental Status: Not following commands, alert, nonverbal.  Patient appears anxious, moving around extremities in bed pulling off blankets   Cranial Nerve Exam:   CN II-XII: Exam limited due to being on BiPAP. Pupils are equal and reactive. No gaze preference on exam,  Right pupil slightly bigger and irregular. Motor Exam:       Antigravity LUE/LE  Tone and bulk decreased on right but moving non purposeful    pronator drift unable to assess    Deep Tendon Reflexes: 2/4 biceps, triceps, brachioradialis, patellar, and achilles b/l, +Babinski on the right    Sensation: withdraws to pain LUE/LE, decerebrate postures RUE, withdraws RLE    Coordination/Cerebellum:       Tremors--none      Rapidly alternating movements: MELANIE          Heel-to-Shin: MELANIE      Finger-to-Nose: MELANIE   Gait and stance:      Gait: deferred      LABS:        CBC:   Recent Labs     10/20/21  1108   WBC 15.4*   RBC 5.83   HGB 18.0   HCT 54.0*      MCV 92.6     BMP:    Recent Labs     10/20/21  1108      K 4.6   *   CO2 20*   BUN 25*   CREATININE 0.7*   GLUCOSE 172*   CALCIUM 8.9       IMAGING:      Impression   1. Left cerebral large middle cerebral artery (MCA) vascular territory   subacute versus acute ischemia. 2. Recommend MRI brain with diffusion-weighted imaging for further evaluation. Above imaging reviewed in detail. ASSESSMENT/PLAN:   77 y.o. -male with PMH of htn,hld, on ciwa for alcohol withdraw presenting to [facility] with cough, shortness of breath, fever , chills and n/v/d. Patient tested positive on 10/14/21 for Covid. Noted to have right hemiparesis by nursing. Subsequent CT head with large left MCA infarct. 1. Left MCA territory stroke  · CT as above  · Given CT head demonstrates large left MCA stroke, do not feel MRI Brain is entirely necessary  · Agree with patient being on aspirin, statin therapy for secondary stroke prevention, however patient is on continuous BiPAP and is not able to intake p.o. medication. Agree with aspirin suppository.   · 2d echo ordered  · B/l Carotid Doppler ordered, if patient cannot obtain CTA head/neck  · Will continue to follow. Patient discussed with attending physician Dr. Nora Johnson    Thank you for allowing us to participate in the care of your patient. If there are any questions regarding evaluation please feel free to contact us. Dilshad SaulARIANNA CNP, 10/21/2021    ------------------------------------    Attending Note:  I have rounded on this patient with Monie Fuller CNP. I have reviewed the chart and we have discussed this case in detail. The patient was seen and examined by myself. Pertinent labs and imaging have been personally reviewed. Our findings and impressions were discussed with the patient. I concur with the Nurse Specialist's assessment and plan. On my examination patient is globally aphasic. He has a flaccid RUE and rigid appearing RLE. Does withdraw to pain RLE, but with decorticate posturing RUE. Of note, he does appear to have a right gaze preference on my examination which is not fitting with a LMCA stroke but could be seen in seizure. No seizure like activity reported and despite right gaze preference is purposefully using left hand which suggests against seizure. Nonetheless, we will closely monitor as patient is at high risk for seizure with LMCA stroke and hx of alcohol abuse. I will have low threshold to treat for seizure. Additionally, I would like to obtain a CTA to assess for carotid artery stenosis and LVO, however,I was informed patient is too agitated to lay still for the imaging. If unable to obtain would like to get a CUS. Additionally, 2D echo is necessary to ensure no cardioembolic source for stroke. He needs to maintain on cardiac telemetry. Given concomitant COVID19 infection must be concerned for hypercoagulable state. Given large vessel stroke, he will continue on aspirin suppository and statin (as able) for secondary stroke prevention. We will continue to follow.     Allen Kelley DO 10/21/2021 10:24 PM

## 2021-10-21 NOTE — PROGRESS NOTES
pulmonary      SUBJECTIVE: on bipap with 100% fio2     OBJECTIVE    VITALS:  BP (!) 166/104   Pulse 104   Temp 100.8 °F (38.2 °C) (Rectal)   Resp 29   Ht 6' 3\" (1.905 m)   Wt 231 lb (104.8 kg)   SpO2 90%   BMI 28.87 kg/m²   HEAD AND FACE EXAM:  No throat injection, no active exudate,no thrush  NECK EXAM;No JVD, no masses, symmetrical  CHEST EXAM; Expansion equal and symmetrical, no masses  LUNG EXAM; Good breath sounds bilaterally. There are expiratory wheezes both lungs, there are crackles at both lung bases  CARDIOVASCULAR EXAM: Positive S1 and S2, no S3 or S4, no clicks ,no murmurs  RIGHT AND LEFT LOWER EXTRIMITY EXAM: No edema, no swelling, no inflamation            LABS   Lab Results   Component Value Date    WBC 20.4 (H) 10/21/2021    HGB 19.1 (H) 10/21/2021    HCT 56.2 (H) 10/21/2021    MCV 92.3 10/21/2021     10/21/2021     Lab Results   Component Value Date    CREATININE 0.6 (L) 10/21/2021    BUN 30 (H) 10/21/2021     (H) 10/21/2021    K 4.6 10/21/2021     (H) 10/21/2021    CO2 16 (L) 10/21/2021     No results found for: INR, PROTIME       Lab Results   Component Value Date    PHOS 3.8 10/21/2021    PHOS 3.7 10/20/2021    PHOS 3.5 10/19/2021        Recent Labs     10/21/21  1030   PH 7.46*   PO2ART 53*   LVR9WIA 25.0*   O2SAT 88.7*         Wt Readings from Last 3 Encounters:   10/17/21 231 lb (104.8 kg)   10/10/21 260 lb (117.9 kg)   05/13/21 252 lb 12.8 oz (114.7 kg)               ASSESMENT  Ac resp failure  covid pneumonia  Bact pneumonia        PLAN  1. cpm  2. Cont bipap  3.  Observe closely as may need vent support  4. cxr today    10/21/2021  Ambar Weston MD, M.D.

## 2021-10-21 NOTE — PROGRESS NOTES
Hospitalist Progress Note      PCP: No primary care provider on file. Date of Admission: 10/14/2021    LOS: 7    Chief Complaint:   Chief Complaint   Patient presents with    Shortness of Breath     dx with covid 4 days ago        Case Summary:   70-year-old gentleman with history of hypertension, hyperlipidemia who presented with worsening cough, shortness of breath, fevers with chills, nausea vomiting, diarrhea, poor appetite, generalized malaise and weakness found to have COVID-19 pneumonia and acute respiratory failure with hypoxia complicated by large left MCA stroke, electrolyte abnormalities and alcohol withdrawals. Active Hospital Problems    Diagnosis Date Noted    Acute ischemic left MCA stroke [facility]) [I63.512] 10/21/2021    Right flaccid hemiplegia (HCC) [G81.01] 10/21/2021    Leukocytosis [D72.829] 10/21/2021    Alcohol withdrawal (HCC) [F10.239] 10/19/2021    Acute respiratory failure with hypoxia (HCC) [J96.01] 10/16/2021    Hyperglycemia [R73.9] 10/16/2021    Pneumonia due to COVID-19 virus [U07.1, J12.82] 10/14/2021         Principal Problem:    Pneumonia due to COVID-19 virus and Acute respiratory failure with hypoxia: Remains with tenuous respiratory status on BiPAP. Remains somnolent with poor response. Flaccid right sided extremities. Noted worsening WBC. -Continue on the BiPAP  -Continue dexamethasone  -Continue baricitinib  -Continue O2 supplementation with sats greater than 90% on BiPAP  -Pulmonology following with recommendations  -Continue empiric antibiotics  -Repeat chest x-ray      Probable bacterial pneumonia superinfection: Continue empiric antibiotic coverage      Acute ischemic left MCA stroke with Right flaccid hemiplegia: In the setting of right-sided weakness and flaccidity. No prior history of stroke. Remains somnolent on the BiPAP  -Continue aspirin rectally  -Continue statin  -Neurology consult  -Awaiting MRI scan.   Currently BiPAP dependent and MRI can be done    Active Problems:    Alcohol withdrawal ([facility] [address]): Scoring low on CIWA. Will discontinue benzodiazepam and monitor of sedating medication    Hyperglycemia: On sliding scale insulin    Social: Call wife Yosi Sepulveda and updated on clinical course. Questions addressed.     Resolved Problems:    Hyponatremia    Hypokalemia        Medications:  Reviewed  Infusion Medications    sodium chloride      dexmedetomidine (PRECEDEX) IV infusion Stopped (10/18/21 1940)    dextrose      sodium chloride 100 mL/hr at 10/21/21 0584     Scheduled Medications    atorvastatin  80 mg Oral Nightly    aspirin  300 mg Rectal Daily    dexamethasone  10 mg IntraVENous Q24H    sodium chloride flush  5-40 mL IntraVENous 2 times per day    thiamine  100 mg Oral Daily    multivitamin  1 tablet Oral Daily    famotidine (PEPCID) injection  20 mg IntraVENous BID    piperacillin-tazobactam  3,375 mg IntraVENous Q8H    albuterol sulfate HFA  2 puff Inhalation BID    And    ipratropium  2 puff Inhalation BID    insulin glargine  10 Units SubCUTAneous Nightly    zinc sulfate  50 mg Oral Daily    ascorbic acid  1,000 mg Oral Daily    baricitinib  4 mg Oral Daily    insulin lispro  0-12 Units SubCUTAneous TID WC    insulin lispro  0-6 Units SubCUTAneous Nightly    sodium chloride flush  5-40 mL IntraVENous 2 times per day    enoxaparin  30 mg SubCUTAneous BID     PRN Meds: ondansetron **OR** ondansetron, polyethylene glycol, sodium chloride flush, sodium chloride, LORazepam **OR** LORazepam **OR** LORazepam **OR** LORazepam **OR** LORazepam **OR** LORazepam **OR** LORazepam **OR** LORazepam, lubrifresh P.M., glucose, dextrose, glucagon (rDNA), dextrose, sodium chloride flush, sodium chloride, acetaminophen **OR** acetaminophen, guaiFENesin-dextromethorphan, potassium chloride **OR** potassium alternative oral replacement **OR** potassium chloride      DVT Prophylaxis: Subcut enoxaparin  Diet: Diet NPO  Code Status: Full Code        ____________________________________________________________________________    Subjective:   Overnight Events:    Uneventful overnight  Remains on the BiPAP with tenuous respiratory status      Physical Exam:  BP (!) 166/104   Pulse 104   Temp 100.8 °F (38.2 °C) (Rectal)   Resp 29   Ht 6' 3\" (1.905 m)   Wt 231 lb (104.8 kg)   SpO2 90%   BMI 28.87 kg/m²   General appearance: No apparent distress, appears stated age. Remains somnolent  HEENT: Normocephalic, atraumatic, MMM, No sclera icterus/conjuctival palor  Neck: Supple, no thyromegally. No jugular venous distention. Respiratory:  Normal respiratory effort. Clear to auscultation, no Rales/Wheezes/Rhonchi. Cardiovascular: S1/S2 without murmurs, rubs or gallops. RRR  Abdomen: Soft, non-tender, non-distended, bowel sounds present. Musculoskeletal: No clubbing, cyanosis or edema bilaterally. Skin: Skin color, texture, turgor normal.  No rashes or lesions. Neurologic: Flaccid right-sided extremities      Intake/Output Summary (Last 24 hours) at 10/21/2021 1252  Last data filed at 10/21/2021 0505  Gross per 24 hour   Intake 20 ml   Output 1100 ml   Net -1080 ml       Labs:   Recent Labs     10/19/21  1119 10/20/21  1108 10/21/21  1157   WBC 11.8* 15.4* 20.4*   HGB 16.9 18.0 19.1*   HCT 49.8 54.0* 56.2*    250 243      Recent Labs     10/19/21  1119 10/20/21  1108    144   K 4.1 4.6    111*   CO2 20* 20*   BUN 23 25*   CREATININE 0.7* 0.7*   CALCIUM 8.6 8.9   PHOS 3.5 3.7   AST 79* 60*   * 131*   BILIDIR 0.4* 0.4*   BILITOT 0.9 1.0   ALKPHOS 89 94     No results for input(s): CKTOTAL, TROPONINI in the last 72 hours. Urinalysis:  No results found for: Jonatan Scarce, BACTERIA, RBCUA, BLOODU, [address]    Radiology:  CT HEAD WO CONTRAST   Final Result   1. Left cerebral large middle cerebral artery (MCA) vascular territory   subacute versus acute ischemia.    2. Recommend MRI brain with diffusion-weighted imaging for further evaluation. Critical results were called by Dr. Deborah Tapia to NP Madelyn Boss on   10/20/2021 at 23:55. XR CHEST PORTABLE   Final Result   Multifocal infiltrates throughout the lungs bilaterally, mildly improved. XR CHEST PORTABLE   Final Result   Bilateral airspace opacities are seen with slight interval worsening. CTA head neck with contrast    (Results Pending)           Samira Hernandez MD      Please excuse brevity and/or typos. This report was transcribed using voice recognition software. Every effort was made to ensure accuracy, however, inadvertent computerized transcription errors may be present.

## 2021-10-21 NOTE — PROGRESS NOTES
The patient was examined at bedside for concerns of right-sided weakness which nurse reports has been present since Monday. On exam, patient appears lethargic, on BiPAP. Right pupil larger than left but both reactive to light. He spontaneously moves his left side. He does have increased muscle tone on right side with painful stimulation. Unable to lift right upper arm and right lower extremity against gravity. Stat CT head showed: A left cerebral large middle cerebral artery vascular territory subacute versus acute ischemia. MRI brain recommended by radiologist.    Plan:  -Start aspirin loading dose and continue dly low dose.   -Stat Lipitor. -MRI brain  -Consult neurology  -Permissive hypertension  -NIHSS checks q4hrs.

## 2021-10-21 NOTE — PROGRESS NOTES
10/21/21 0359   NIV Type   NIV Started/Stopped On   Equipment Type v60   Mode Bilevel   Mask Type Full face mask   Settings/Measurements   IPAP 15 cmH20   CPAP/EPAP 8 cmH2O   Rate Ordered 12   Resp 20   FiO2  100 %   I Time/ I Time % 1.1 s   Vt Exhaled 1119 mL   Minute Volume 27.9 Liters   Mask Leak (lpm) 45 lpm   Comfort Level Good   Using Accessory Muscles No   SpO2 90   Alarm Settings   Alarms On Y

## 2021-10-21 NOTE — PROGRESS NOTES
Report received from day shift nurse pt lethargic with flaccid right arm. Per day shift nurse patient in this condition since Monday. This nurse at bedside assessed patient, right arm flaccid, pt able to lift left arm and move left and right  leg. Uneven pupils. At baseline  patient is A&O, independent, lives at home. This nurse notified hospitalist of her assessment and asked hospitalist to come to bedside.

## 2021-10-21 NOTE — PROGRESS NOTES
Confirmed with hospitalist about Stat order for CTA, per hospitalist okay to get MRI and CTA in the AM at the same time.

## 2021-10-21 NOTE — CARE COORDINATION
Patient unable to participate. Notes reviewed. Family stated that patient drinks daily; would not quantify. Will discuss when patient is able to participate.  Yessenia Ambrose RN

## 2021-10-21 NOTE — PROGRESS NOTES
RN called this AM and informed the MRI dept that pt is on continuous bi-pap and is unable to come off. I informed RN that we can keep order on hold until pt condition gets better. RN said that pt condition may not improve.   I told RN that I will cancel order due to bi-pap not allowed to enter MRI environment and if pt condition improves enough to come off bi-pap then the doctor will need to reorder the MRI exam.

## 2021-10-22 PROBLEM — T79.7XXA SUBCUTANEOUS EMPHYSEMA (HCC): Status: ACTIVE | Noted: 2021-01-01

## 2021-10-22 PROBLEM — J93.9 PNEUMOTHORAX: Status: ACTIVE | Noted: 2021-01-01

## 2021-10-22 PROBLEM — J98.2 PNEUMOMEDIASTINUM (HCC): Status: ACTIVE | Noted: 2021-01-01

## 2021-10-22 NOTE — PROGRESS NOTES
10/22/21 0119   NIV Type   NIV Started/Stopped On   Equipment Type v60   Mode Bilevel   Settings/Measurements   IPAP 20 cmH20   CPAP/EPAP 12 cmH2O   Rate Ordered 12   Resp (!) 31   FiO2  100 %   I Time/ I Time % 1.1 s   SpO2 85     Patient desaturation into mid 80s. Adjusted bipap settings from 15/8 to 20/12. Patient is very tachypneic. ABG also drawn.

## 2021-10-22 NOTE — PLAN OF CARE
[address] [facility] [address] Dawne Aase concerning Carotid Ultrasound:        [phone] From: Katheryn Strange [facility] Ultrasound RE: [place], there was a Carotid Ultrasound for a stroke workup ordered for this patient. There is also a CTA of the head and neck ordered as well and this is the gold standarad for carotids. Is it ok to cancel the ultasound? Please advise  Read 7:57 PM   0'\"   10/21/21 7:58 PM   I dont believe the patient will be able to go for cta due to agitation which is why it was ordered. If he is able to go for cta then it does not need done. 0'\"   10/21/21 8:02 PM   Ok CT said they are just waiting on raspatory  Read 8:03 PM   0'\"   10/21/21 8:03 PM   Oh okay great! 0'\"   10/21/21 8:04 PM   Would you like me to wait for the CTA to be complete before I cancel? Read 8:04 PM   0'\"   10/21/21 8:04 PM   Um yes please just in case it wont be able to be completed  0'\"   10/21/21 8:05 PM   Sounds good to me.  Have a great night  Read 8:05 PM   0'\"   10/21/21 8:05 PM   You as well       Yosi Hobson  [id number]

## 2021-10-22 NOTE — PROGRESS NOTES
Patient had chest x-ray this morning for post intubattion at 3:00am, Rn [employer] nurse stated portable chest x-ray  can be done tomorrow.  [place]

## 2021-10-22 NOTE — PROGRESS NOTES
PROCEDURE PERFORMED: right femoral temporary dialysis catheter placed by Dr. Anthony Gonzales: hemodialysis    INFORMED CONSENT:  Obtained prior to procedure. Consent placed in chart. JOHN SCORE PRE PROCEDURE:  NA    PT IN THE ROOM AT WHAT TIME: Bedside procedure    ASSESSMENT: Pt intubated & sedated    TIME OUT COMPLETE: 1539    BARRIER PRECAUTIONS & STERILE TECHNIQUE:               Pt supine in hospital bed. Pt on Cardiac Monitor. Pt prepped and draped in a sterile fashion with chlorhexadine.     PAIN/LOCAL ANESTHESIA/SEDATION MANAGEMENT:           Local: Lidocaine given by Dr. Nasrin Hylton:           ACCESS TIME: 1540          US/FLUORO: US 4 images          WIRE USED: Stiff micropuncture kit          CATHETER USED: 12.5Fr x 20cm triple lumen dialysis temp cath            Sutured in place by Dr. Lazarus Layer heparin locked by Ozzy Montiel RN    STERILE DRESSINGS: biopatch & tegaderm applied by Ozzy Montiel RN    SPECIMENS: None    EBL: <2cc    FOLLOW- UP X-RAY: stat ordered    COMPLICATIONS/ OUTCOME: None    STAFF PRESENT DURING PROCEDURE: Dr. Aure Aleman RN, Shasha Goldstein RN, Sonam MCGRAW    JOHN SCORE POST PROCEDURE: NA    REPORT CALLED TO: Danny Mahoney RN @ bedside    PT LEFT ROOM AT WHAT TIME: NA

## 2021-10-22 NOTE — CONSULTS
Nephrology Service Consultation    Patient:  Susana Fabry  MRN: [id number]  Consulting physician:  Merlynn Paget, MD  Reason for Consult: Acute renal failure with hyperkalemia    History Obtained From:  patient, electronic medical record  PCP: No primary care provider on file. HISTORY OF PRESENT ILLNESS:   The patient is a 77 y.o. male who presents with Covid pneumonia. Was home. Resolved worsening hypoxia shortness of breath return back to the hospital.  Patient now intubated and when seen today was a problem. Noted to have a pneumothorax. And renal function today increased rapidly with low urine output noted to have hyperkalemia. We will try to treat medically. I try to call the wife and left a message. I will also ask interventional radiology place a temporary dialysis catheter in case needs dialysis. Patient with history of alcohol abuse hypertension hyperlipidemia and also had a MCA stroke this admission. Possible alcohol withdrawal as well. [place] He was intubated yesterday. And has declined since then.     Past Medical History:        Diagnosis Date    Hyperlipidemia     Hypertension        Past Surgical History:        Procedure Laterality Date    APPENDECTOMY      COLONOSCOPY  03/25/2013    polyp,s x 5    EYE SURGERY Left     HERNIA REPAIR         Medications:   Scheduled Meds:   lidocaine PF  5 mL IntraDERmal Once    sodium chloride flush  5-40 mL IntraVENous 2 times per day    vancomycin  1,250 mg IntraVENous Q12H    ipratropium  4 puff Inhalation Q4H    And    albuterol sulfate HFA  4 puff Inhalation Q4H    insulin lispro  0-12 Units SubCUTAneous Q4H    calcium gluconate  1,000 mg IntraVENous Once    sodium zirconium cyclosilicate  10 g Oral P8Q    atorvastatin  80 mg Oral Nightly    aspirin  300 mg Rectal Daily    dexamethasone  10 mg IntraVENous Q24H    sodium chloride flush  5-40 mL IntraVENous 2 times per day    thiamine  100 mg Oral Daily    multivitamin  1 tablet Oral Daily    famotidine (PEPCID) injection  20 mg IntraVENous BID    piperacillin-tazobactam  3,375 mg IntraVENous Q8H    insulin glargine  10 Units SubCUTAneous Nightly    zinc sulfate  50 mg Oral Daily    ascorbic acid  1,000 mg Oral Daily    baricitinib  4 mg Oral Daily    sodium chloride flush  5-40 mL IntraVENous 2 times per day    enoxaparin  30 mg SubCUTAneous BID     Continuous Infusions:   propofol 25 mcg/kg/min (10/22/21 0757)    fentaNYL 50 mcg/hr (10/22/21 6533)    sodium chloride      sodium chloride      dexmedetomidine (PRECEDEX) IV infusion Stopped (10/18/21 1940)    dextrose      sodium chloride 100 mL/hr at 10/21/21 0553     PRN Meds:.sodium chloride flush, sodium chloride, ondansetron **OR** ondansetron, polyethylene glycol, sodium chloride flush, sodium chloride, lubrifresh P.M., glucose, dextrose, glucagon (rDNA), dextrose, sodium chloride flush, sodium chloride, acetaminophen **OR** acetaminophen, guaiFENesin-dextromethorphan, potassium chloride **OR** potassium alternative oral replacement **OR** potassium chloride    Allergies:  Patient has no known allergies. Social History:   TOBACCO:   reports that he has quit smoking. He started smoking about 30 years ago. He has a 30.00 pack-year smoking history. He has never used smokeless tobacco.  ETOH:   reports current alcohol use. OCCUPATION:      Family History:   History reviewed. No pertinent family history. REVIEW OF SYSTEMS:  Negative except for sedated on the ventilator. Physical Exam:    I/O: 10/21 0701 - 10/22 0700  In: -   Out: 1350 [Urine:1350]    Vitals: BP 99/72   Pulse 113   Temp 100.7 °F (38.2 °C) (Rectal)   Resp 21   Ht 6' 3\" (1.905 m)   Wt 231 lb (104.8 kg)   SpO2 (!) 88%   BMI 28.87 kg/m²   General appearance: Sedated ET tube prone  HEENT: Head: Normal, normocephalic, atraumatic.   Neck: supple, symmetrical, trachea midline  Lungs: diminished breath sounds bilaterally decreased on the right and bilateral crepitus  Heart: S1, S2 normal  Abdomen: abnormal findings: Soft obese  Extremities: edema trace  Neurologic: Mental status: alertness: Sedated      CBC:   Recent Labs     10/20/21  1108 10/21/21  1157 10/22/21  1123   WBC 15.4* 20.4* 36.9*   HGB 18.0 19.1* 19.5*    243 353     BMP:    Recent Labs     10/20/21  1108 10/21/21  1157 10/22/21  1240    148* 153*   K 4.6 4.6 6.6*   * 116* 115*   CO2 20* 16* 19*   BUN 25* 30* 64*   CREATININE 0.7* 0.6* 3.4*   GLUCOSE 172* 203* 396*     Hepatic:   Recent Labs     10/20/21  1108 10/21/21  1157 10/22/21  1240   AST 60* 54* 44*   * 119* 102*   BILITOT 1.0 1.2* 3.7*   ALKPHOS 94 103 105     Troponin: No results for input(s): TROPONINI in the last 72 hours. BNP: No results for input(s): BNP in the last 72 hours. Lipids: No results for input(s): CHOL, HDL in the last 72 hours. Invalid input(s): LDLCALCU  ABGs:   Lab Results   Component Value Date    PO2ART 71 10/22/2021    OTX4JPB 57.0 10/22/2021     INR: No results for input(s): INR in the last 72 hours.   Renal Labs  Albumin:    Lab Results   Component Value Date    LABALBU 3.4 10/22/2021    LABALBU 3.4 10/22/2021     Calcium:    Lab Results   Component Value Date    CALCIUM 8.6 10/22/2021     Phosphorus:    Lab Results   Component Value Date    PHOS 13.3 10/22/2021     U/A:    Lab Results   Component Value Date    NITRU NEGATIVE 10/21/2021    COLORU YELLOW 10/21/2021    WBCUA 6 10/21/2021    RBCUA 251 10/21/2021    MUCUS RARE 10/21/2021    TRICHOMONAS NONE SEEN 10/21/2021    BACTERIA NEGATIVE 10/21/2021    CLARITYU CLEAR 10/21/2021    SPECGRAV 1.029 10/21/2021    UROBILINOGEN NEGATIVE 10/21/2021    BILIRUBINUR NEGATIVE 10/21/2021    BLOODU LARGE 10/21/2021    KETUA SMALL 10/21/2021     ABG:    Lab Results   Component Value Date    PSO9FBI 57.0 10/22/2021    PO2ART 71 10/22/2021    BKP2YVL 19.4 10/22/2021     HgBA1c:    Lab Results   Component Value Date    LABA1C 7.1 10/20/2021 Microalbumen/Creatinine ratio:  No components found for: RUCREAT  TSH:  No results found for: TSH  IRON:  No results found for: IRON  Iron Saturation:  No components found for: PERCENTFE  TIBC:  No results found for: TIBC  FERRITIN:    Lab Results   Component Value Date    FERRITIN 1,746 10/14/2021     RPR:  No results found for: RPR  GISEL:  No results found for: ANATITER, GISEL  24 Hour Urine for Creatinine Clearance:  No components found for: CREAT4, UHRS10, UTV10  -----------------------------------------------------------------      Assessment and Recommendations     Patient Active Problem List   Diagnosis Code    Pneumonia due to COVID-19 virus U07.1, J12.82    Acute respiratory failure with hypoxia ([facility]) J96.01    Hyperglycemia R73.9    Alcohol withdrawal ([facility]) F10.239    Acute ischemic left MCA stroke ([facility]) I63.512    Right flaccid hemiplegia ([facility]) G81.01    Leukocytosis D72.829    Pneumomediastinum ([facility]) J98.2    Subcutaneous emphysema ([address] T79. 7XXA    Biapical Pneumothorax J93.9     Impression plan  1. Acute renal failure from ATN  2. Metabolic acidosis with hyperkalemia  3. Hypernatremia  #4 COVID-19 positive  5. Respiratory distress with pneumothorax  6 leukocytosis  7. Acute ischemic left MCA stroke  8. History of alcohol use with possible withdrawal  9. Type 2 diabetes    Plan  1. In the above setting with acute renal failure we will give aggressive IV fluids has a Hayes catheter in place we will make sure it is flushing okay. If not respond to medical therapy with treatment of hyperkalemia will need dialysis later today  2. Maintain bicarb drip in the setting of metabolic acidosis given insulin glucose and will give Lokelma at this time  3. Sodium elevated and will give hypotonic fluids  4. Treat in the setting of Covid  5. Plan for chest tube today and was prone ventilation   6. monitor WBC count above setting  7. Aware with CVA and supportive care for that  8.   Monitor for DTs and sedation as needed  9. Sliding scale insulin and try to correct  10.   Left message for the wife and evaluate for possible dialysis repeat labs this evening  Electronically signed by Vickey Reich MD on 10/22/2021 at 3:07 PM

## 2021-10-22 NOTE — PROGRESS NOTES
Echo attempted at this time. Patient prone. Vicenta MCGRAW notified echo could not be completed with patient in prone position. RN to call heart center if patient becomes supine and echo can be completed.

## 2021-10-22 NOTE — PROGRESS NOTES
[address] [facility]  consulted by Suad Dolan CNP for monitoring and adjustment. Indication for treatment: Sepsis  Goal trough: 10-15 mcg/mL    Pertinent Laboratory Values:   Temp Readings from Last 3 Encounters:   10/22/21 100.7 °F (38.2 °C) (Rectal)   10/10/21 99.3 °F (37.4 °C) (Oral)   05/13/21 96.9 °F (36.1 °C) (Infrared)     Recent Labs     10/20/21  1108 10/21/21  1157 10/22/21  1123   WBC 15.4* 20.4* 36.9*     Recent Labs     10/20/21  1108 10/21/21  1157 10/22/21  1240   BUN 25* 30* 64*   CREATININE 0.7* 0.6* 3.4*     Estimated Creatinine Clearance: 28 mL/min (A) (based on SCr of 3.4 mg/dL (H)). Intake/Output Summary (Last 24 hours) at 10/22/2021 1656  Last data filed at 10/22/2021 1500  Gross per 24 hour   Intake --   Output 1360 ml   Net -1360 ml       Pertinent Cultures:  Date    Source    Results  10/22   Blood    Ordered    Vancomycin level:   TROUGH:  No results for input(s): VANCOTROUGH in the last 72 hours. RANDOM:  No results for input(s): VANCORANDOM in the last 72 hours. Assessment:  · WBC and temperature: WBC @ 36.9;  Tmax = 102F  · SCr, BUN, and urine output:  · JOANIE  · 0.6 -> 3.4  · Day(s) of therapy: 1  · Vancomycin concentration: to be collected    Plan:  · Adjust from scheduled dosing to intermittent  · Vancomycin 2000 mg x1 this AM  · Plan to collect a random level tomorrow AM and re-dose is appropriate  · Pharmacy will continue to monitor patient and adjust therapy as indicated    Kunal 3 10/23 @ 0600    Thank you for the consult,  Zuly Raza, [facility], PharmD  10/22/2021 4:56 PM

## 2021-10-22 NOTE — SIGNIFICANT EVENT
Hospitalist critical care progress Note      Name:  Gabriel Armijo /Age/Sex: 1955  (77 y.o. male)   MRN & CSN:  [id number] & [id number] Admission Date/Time: 10/14/2021  5:46 AM   Location:  -A PCP: No primary care provider on file. History of Present Illness:     Chief Complaint: Pneumonia due to COVID-19 virus  Gabriel Armijo is a 77 y.o.  male  who was admitted 10/14/2021 for Covid pneumonia. Patient was on 15 L nasal cannula initially. Patient was getting baricitinib. Later patient was noted to be on BiPAP probably since 10/19. On 10/20 patient was noted that the patient had right-sided weakness, appeared lethargic on BiPAP. Stat CT head showed a left large middle cerebral artery subacute versus acute stroke. Hospitalist team was asked to evaluate the patient as he was hypoxic on BiPAP. Patient was 100% FiO2,  of BiPAP setting. Patient was saturating 84%. Patient was minimally responsive. Was barely opening his eyes, was able to withdraw his left upper and lower extremities to pain. Ten point ROS reviewed negative, unless as noted above. Stat VBG, chest x-ray stat were ordered. VBG-pH 7.44, PCO2 24, PO2 53, HCO3 16.3. Chest x-ray-was suggestive of pneumomediastinum, subcutaneous emphysema in the soft tissues of the neck and chest wall, bilateral apical pneumothoraces noted. On review of prior chest x-rays-development of pneumomediastinum of bilateral pneumothoraces since 10/18/2021. Patient was placed on air Vo, nonrebreather. It was difficult to keep up patient's oxygen saturation. Patient oxygen saturation were initially 70, came up to 83-84. At this time decision was made to intubate the patient. CRNA was not available. RT intubated the patient at bedside. Patient's oxygen saturation was up to 88-89%, 100% FiO2.     Repeat x-ray after intubation-redemonstration of extensive pneumomediastinum, subcutaneous emphysema hospitalist suggestion of the biapical pneumothorax. Amount of pneumomediastinum and subcutaneous emphysema appears to have increased when compared to the prior study. Discussed with Dr. Dahlia Pichardo over phone regarding the chest x-ray findings. Stated that there is no need for any intervention at this time until unless the pneumothoraces worsen. Will monitor with repeat chest x-rays. Objective: Intake/Output Summary (Last 24 hours) at 10/22/2021 0506  Last data filed at 10/21/2021 1802  Gross per 24 hour   Intake --   Output 700 ml   Net -700 ml      Vitals:   Vitals:    10/22/21 0446   BP:    Pulse: 137   Resp: 24   Temp:    SpO2: (!) 88%       Due to the immediate potential for life-threatening deterioration due to  persistent hypoxic respiratory failure, I spent 38 minutes providing critical care. This time is excluding time spent performing procedures.       Electronically signed by Stephany Simpson MD on 10/22/2021 at 5:06 AM

## 2021-10-22 NOTE — PROGRESS NOTES
Results for Soco Robledo (MRN [id number]) as of 10/22/2021 04:44   Ref.  Range 10/22/2021 03:15   pH, Bld Latest Ref Range: 7.34 - 7.45  7.14 (L)   Base Excess Latest Ref Range: 0 - 3.3  10 (H)   O2 Sat Latest Ref Range: 96 - 97 % 88.7 (L)   Carbon Monoxide, Blood Latest Ref Range: 0 - 5 % 1.7   CO2 Content Latest Ref Range: 19 - 24 MMOL/L 21.1   pCO2, Arterial Latest Ref Range: 32 - 45 MMHG 57.0 (H)   pO2, Arterial Latest Ref Range: 75 - 100 MMHG 71 (L)   HCO3, Arterial Latest Ref Range: 18 - 23 MMOL/L 19.4   Methemoglobin, Arterial Latest Ref Range: <1.5 % 1.2     AC/VC, 12, 500, 100%, +12

## 2021-10-22 NOTE — PROGRESS NOTES
Called to bedside due to respiratory status. Noted subcutaneous emphysema on left anterior neck area, patient in respiratory distress with SpO2 in low 80s despite prior interventions. Performed endotracheal intubation under the supervision and recommendation of CNP. Utilized Size 3 Mac blade with size 7.5 ETT. View of the cords was obscured by thick secretions and blood. Color changed noted on colorimetric device after second intubation attempt. Intubation was atraumatic. ETT secured at 25cm at the lip. Confirmed bilateral BRS.

## 2021-10-22 NOTE — PROGRESS NOTES
Called CLAUDIA Watkins to report that, as this RN was preparing to administer ordered Ativan to pt, this RN noticed that Sp02 was 85%, and Ativan was not administered. RT increased bipap to max, and pt still with Sp02 in mid 80s. June to come see pt.

## 2021-10-22 NOTE — PROGRESS NOTES
PROCEDURE PERFORMED: left CT placement by Dr. Patel Flight: left pneumothorax    INFORMED CONSENT:  Obtained prior to procedure. Consent placed in chart. JOHN SCORE PRE PROCEDURE:  NA    PT IN THE ROOM AT WHAT TIME: Bedside procedure    ASSESSMENT: Pt sedated & intubated    TIME OUT COMPLETE: 1513    BARRIER PRECAUTIONS & STERILE TECHNIQUE:               Pt in hospital bed & re-positioned from prone to supine. Pt on Cardiac Monitor. Pt prepped and draped in a sterile fashion with chlorhexadine.     PAIN/LOCAL ANESTHESIA/SEDATION MANAGEMENT:           Local: Lidocaine given by Dr. Dorota Abbottr:           ACCESS TIME: 3725          US/FLUORO: US           WIRE USED: Stiff micropuncture set          CATHETER USED: 12Fr chest tube pigtail connected to atrium -20 of suction   Sutured in place by Dr. Rodriguez Mars: Zi Hopkins applied by Bill Danielson RN    SPECIMENS: none    EBL: <3cc    FOLLOW- UP X-RAY: Stat ordered    COMPLICATIONS/ OUTCOME: None    STAFF PRESENT DURING PROCEDURE: Dr. Parris Castro RN, Jeannie Jean RN, Sonam MCGRAW     JOHN SCORE POST PROCEDURE: NA    REPORT CALLED TO: Kenneth Silva RN @ bedside    PT LEFT ROOM AT WHAT TIME: NA

## 2021-10-22 NOTE — PROGRESS NOTES
pulmonary      SUBJECTIVE: he was intubated about 5 hours ago     OBJECTIVE    VITALS:  /69   Pulse 141   Temp 102 °F (38.9 °C) (Rectal)   Resp 24   Ht 6' 3\" (1.905 m)   Wt 231 lb (104.8 kg)   SpO2 (!) 76%   BMI 28.87 kg/m²     NECK EXAM;No JVD, no masses, symmetrical  CHEST EXAM; Expansion equal and symmetrical, no masses  LUNG EXAM; Good breath sounds bilaterally.  There are expiratory wheezes both lungs, there are crackles at both lung bases  CARDIOVASCULAR EXAM: Positive S1 and S2, no S3 or S4, no clicks ,no murmurs  RIGHT AND LEFT LOWER EXTRIMITY EXAM: No edema, no swelling, no inflamation            LABS   Lab Results   Component Value Date    WBC 20.4 (H) 10/21/2021    HGB 19.1 (H) 10/21/2021    HCT 56.2 (H) 10/21/2021    MCV 92.3 10/21/2021     10/21/2021     Lab Results   Component Value Date    CREATININE 0.6 (L) 10/21/2021    BUN 30 (H) 10/21/2021     (H) 10/21/2021    K 4.6 10/21/2021     (H) 10/21/2021    CO2 16 (L) 10/21/2021     No results found for: INR, PROTIME       Lab Results   Component Value Date    PHOS 3.8 10/21/2021    PHOS 3.7 10/20/2021    PHOS 3.5 10/19/2021        Recent Labs     10/21/21  1030 10/22/21  0130 10/22/21  0315   PH 7.46* 7.44 7.14*   PO2ART 53* 53* 71*   BSD1WGA 25.0* 24.0* 57.0*   O2SAT 88.7* 87.0* 88.7*         Wt Readings from Last 3 Encounters:   10/17/21 231 lb (104.8 kg)   10/10/21 260 lb (117.9 kg)   05/13/21 252 lb 12.8 oz (114.7 kg)        EXAMINATION:   ONE XRAY VIEW OF THE CHEST       10/22/2021 2:33 am       COMPARISON:   Chest radiograph obtained on the same day.       HISTORY:   ORDERING SYSTEM PROVIDED HISTORY: verify ett placement   TECHNOLOGIST PROVIDED HISTORY:   Reason for exam:->verify ett placement   Reason for Exam: verify ett placement   Acuity: Acute   Type of Exam: Initial   Mechanism of Injury: verify ett placement   Relevant Medical/Surgical History: verify ett placement       FINDINGS:   There has been interval

## 2021-10-22 NOTE — PROGRESS NOTES
10/22/21 0009   NIV Type   NIV Started/Stopped On   Equipment Type v60   Mode Bilevel   Mask Type Full face mask   Mask Size Large   Settings/Measurements   IPAP 15 cmH20   CPAP/EPAP 8 cmH2O   Rate Ordered 12   Resp 30   Insp Rise Time (%) 3 %   FiO2  100 %   I Time/ I Time % 1.1 s   Vt Exhaled 694 mL   Minute Volume 21 Liters   SpO2 88

## 2021-10-22 NOTE — PROGRESS NOTES
10/22/21 0849   Vent Information   $Ventilation $Initial Day   Vent Type 980   Vent Mode AC/VC   Vt Ordered 500 mL   Rate Set 16 bmp   Peak Flow 55 L/min   Pressure Support 0 cmH20   FiO2  100 %   SpO2 (!) 85 %   SpO2/FiO2 ratio 85   Sensitivity 3   PEEP/CPAP 12   I Time/ I Time % 0 s   Vent Patient Data   High Peep/I Pressure 0   Peak Inspiratory Pressure 27 cmH2O   Mean Airway Pressure 16 cmH20   Rate Measured 22 br/min   Vt Exhaled 538 mL   Minute Volume 11.3 Liters   I:E Ratio 1:1.80   Plateau Pressure 25 TFF21   Static Compliance 33 mL/cmH2O   Total PEEP 12 cmH20   Auto PEEP 0 cmH20   Spontaneous Breathing Trial (SBT) RT Doc   Pulse 130   Breath Sounds   Right Upper Lobe Diminished   Right Middle Lobe Diminished   Right Lower Lobe Diminished   Left Upper Lobe Diminished   Left Lower Lobe Diminished   Additional Respiratory  Assessments   Resp 22   Alarm Settings   High Pressure Alarm 40 cmH2O   Delay Alarm 20 sec(s)   Low Minute Volume Alarm 2.5 L/min   Apnea (secs) 20 secs   High Respiratory Rate 40 br/min   Low Exhaled Vt  250 mL   ETT (adult)   Placement Date/Time: 10/22/21 0220   Timeout: Patient;Procedure; Appropriate Equipment;Correct Position  Preoxygenation: Yes  Mask Ventilation: Ventilated by mask (1)  Technique: Direct laryngoscopy;Rapid sequence;Stylet  Type: Cuffed  Tube Size: 7.5 m. ..    Secured at 25 cm   Measured From Lips   ET Placement Left   Secured By Commercial tube strong   Site Condition Dry

## 2021-10-22 NOTE — PROGRESS NOTES
Critical result- Potassium 6.6. Dr.Mompoloki Coles called,no answer. Voicemail let.  Waiting on call back

## 2021-10-22 NOTE — PROGRESS NOTES
10/22/21 1940   Vent Information   Vent Type 980   Vent Mode AC/VC   Vt Ordered 500 mL   Rate Set 16 bmp   Peak Flow 55 L/min   Pressure Support 0 cmH20   FiO2  100 %   Sensitivity 3   PEEP/CPAP 12   I Time/ I Time % 0 s   Humidification Source HME   Vent Patient Data   High Peep/I Pressure 0   Peak Inspiratory Pressure 27 cmH2O   Mean Airway Pressure 16 cmH20   Rate Measured 17 br/min   Vt Exhaled 512 mL   Minute Volume 8.75 Liters   I:E Ratio 1:2.70   Plateau Pressure 25 BPI48   Static Compliance 27 mL/cmH2O

## 2021-10-22 NOTE — PROGRESS NOTES
Notified NP June of pt having temp of 101.2 after receiving Tylenol approx 2 hrs ago for a temp of 100.9. Also notified NP of pt having elevated Bps, with SBP in 160's - 170s at times. Current /104. Also notified NP of HR in 120s, and that pt seems agitated, but has Ativan ordered only for CIWA protocol. NP ordered 1 mg IV Ativan.

## 2021-10-22 NOTE — PROGRESS NOTES
Comprehensive Nutrition Assessment    Type and Reason for Visit:  Reassess    Nutrition Recommendations/Plan:   · Please consult RD to start EN    Nutrition Assessment:  Pt is now intubated and is NPO. RD not consulted for EN order and manage at this time. Please consult the team to start EN    Malnutrition Assessment:  Malnutrition Status:  Insufficient data    Context:  Acute Illness       Estimated Daily Nutrient Needs:  Energy (kcal):  4822-9536 ([address] w/ stress factor 1.0-1.2); Weight Used for Energy Requirements:  Current     Protein (g):   (1.0-1.2 g/kg); Weight Used for Protein Requirements:  Ideal        Fluid (ml/day):  2000; Method Used for Fluid Requirements:  1 ml/kcal      Wounds:  None       Current Nutrition Therapies:    Diet NPO    Anthropometric Measures:  · Height: 6' 3\" (190.5 cm)  · Current Body Weight: 231 lb (104.8 kg)   · Admission Body Weight: 259 lb (117.5 kg)    · Usual Body Weight: 252 lb (114.3 kg)     · Ideal Body Weight: 196 lbs; % Ideal Body Weight 132.6 %   · BMI: 28.9  · BMI Categories: Obese Class 1 (BMI 30.0-34. 9)       Nutrition Diagnosis:   · Inadequate oral intake related to  (poor appetite) as evidenced by poor intake prior to admission      Nutrition Interventions:   Food and/or Nutrient Delivery:  Continue NPO  Nutrition Education/Counseling:  No recommendation at this time   Coordination of Nutrition Care:  Continue to monitor while inpatient    Goals:  pt will have a source of nutrition started in the next 24-48 hours       Nutrition Monitoring and Evaluation:   Behavioral-Environmental Outcomes:  None Identified   Food/Nutrient Intake Outcomes:  Food and Nutrient Intake, Supplement Intake  Physical Signs/Symptoms Outcomes:  Biochemical Data, Weight, GI Status, Hemodynamic Status, Fluid Status or Edema     Discharge Planning:     Too soon to determine     Electronically signed by Lexii Otto RD, LD on 08/37/23 at 7:40 PM EDT    Contact: [phone]

## 2021-10-22 NOTE — PROGRESS NOTES
10/22/21 1623   Vent Information   Vent Type 980   Vent Mode AC/VC   Vt Ordered 500 mL   Rate Set 16 bmp   Peak Flow 55 L/min   Pressure Support 0 cmH20   FiO2  100 %   SpO2 90 %   SpO2/FiO2 ratio 90   Sensitivity 3   PEEP/CPAP 12   I Time/ I Time % 0 s   Humidification Source HME   Vent Patient Data   High Peep/I Pressure 0   Peak Inspiratory Pressure 26 cmH2O   Mean Airway Pressure 16 cmH20   Rate Measured 18 br/min   Vt Exhaled 513 mL   Minute Volume 8.94 Liters   I:E Ratio 1:2.30   Plateau Pressure 24 EFW68   Static Compliance 36 mL/cmH2O   Total PEEP 12 cmH20   Auto PEEP 0 cmH20   Spontaneous Breathing Trial (SBT) RT Doc   Pulse 125   Breath Sounds   Right Upper Lobe Diminished   Right Middle Lobe Diminished   Right Lower Lobe Diminished   Left Upper Lobe Diminished   Left Lower Lobe Diminished   Additional Respiratory  Assessments   Resp 18   Subglottic Suction Done? Yes   Alarm Settings   High Pressure Alarm 40 cmH2O   Delay Alarm 20 sec(s)   Low Minute Volume Alarm 2.5 L/min   Apnea (secs) 20 secs   High Respiratory Rate 40 br/min   Low Exhaled Vt  250 mL   ETT (adult)   Placement Date/Time: 10/22/21 0220   Timeout: Patient;Procedure; Appropriate Equipment;Correct Position  Preoxygenation: Yes  Mask Ventilation: Ventilated by mask (1)  Technique: Direct laryngoscopy;Rapid sequence;Stylet  Type: Cuffed  Tube Size: 7.5 m. ..    Secured at 23 cm   Measured From Lips   ET Placement Left   Secured By Commercial tube strong   Site Condition Dry

## 2021-10-22 NOTE — CONSULTS
PICC Consult complete. Education regarding PICC insertion discussed and risks and benefits assessed with wife. Wife verbalized understanding. Consent signed by wife. Time out done @ 0915. PICC inserted in RUE Brachial Vessel while patient proned with/without difficulty per protocol. Placement verified via VPSG4 monitoring system. Good blood return and flushes easily on all three ports. Patient tolerated well. Education pamphlets left in chart  Ultrasound photos of vein diameter and doppler signature placed in chart. PICC OK to use.

## 2021-10-22 NOTE — PROGRESS NOTES
Hospitalist Progress Note      PCP: No primary care provider on file. Date of Admission: 10/14/2021    LOS: 8    Chief Complaint:   Chief Complaint   Patient presents with    Shortness of Breath     dx with covid 4 days ago        Case Summary:   51-year-old gentleman with history of alcohol abuse, hypertension, hyperlipidemia who presented with worsening cough, shortness of breath, fevers with chills, vomiting, diarrhea, poor appetite, generalized malaise and weakness found to have COVID-19 pneumonia and acute respiratory failure with hypoxia complicated by lack left MCA stroke, electrolyte abnormalities and alcohol withdrawals. He decompensated the night of 10/21/2021 with hypoxia and sats of 84% on BiPAP at 20/12 with FiO2 of 40%. Chest x-ray done was suggestive of pneumomediastinum with subcutaneous emphysema and small biapical pneumothoraces. He remained with tenuous saturations of 88 to 89% post intubation with FiO2 of 100%. Active Hospital Problems    Diagnosis Date Noted    Pneumomediastinum ([address] [J98.2] 10/22/2021    Subcutaneous emphysema ([address] [T79. 7XXA] 10/22/2021    Biapical Pneumothorax [J93.9] 10/22/2021    Acute ischemic left MCA stroke ([address] [I63.512] 10/21/2021    Right flaccid hemiplegia (HCC) [G81.01] 10/21/2021    Leukocytosis [D72.829] 10/21/2021    Alcohol withdrawal (HCC) [F10.239] 10/19/2021    Acute respiratory failure with hypoxia (HCC) [J96.01] 10/16/2021    Hyperglycemia [R73.9] 10/16/2021    Pneumonia due to COVID-19 virus [U07.1, J12.82] 10/14/2021         Principal Problem:    Pneumonia due to COVID-19 virus and Acute respiratory failure with hypoxia: Patient continues to do poorly with hypoxia despite intubation on 100% FiO2.   He was proned this morning.  -Continue dexamethasone  -Continue baricitinib  -Patient intubated 100% FiO2  -Pulmonology following with recommendation  -We will discuss with pulmonology regarding paralytics given poor saturations despite 100% FiO2      Biapical Pneumothorax, Pneumomediastinum and Subcutaneous emphysema: In the setting of positive pressure and COVID-19 infection. Unclear etiology. Cardiothoracic was contacted and no surgical intervention needed at this time for small apical pneumothorax. Will monitor      Acute ischemic left MCA stroke and Right flaccid hemiplegia: CT brain noted for left MCA vascular territory subacute versus acute ischemia with recommendation for MRI brain. However MRI precluded by being on ventilator could not be done  -Continue aspirin and statin  -Once patient has stabilized, will seek carotid vascular imaging    Active Problems:    Hyperglycemia: On steroids. Continue sliding scale insulin    Leukocytosis: In the setting of steroid use, pneumothorax, pneumomediastinum and subcutaneous emphysema and presumed bacterial superinfection. Continues on empiric antibiotic. Social: Met with the patient's wife this morning. Clinical condition explained and gravity of the situation explained. Patient carries poor prognosis. Questions addressed.   She is yet to talk to the patient's daughters      Resolved Problems:    Hyponatremia    Hypokalemia    Alcohol withdrawal (HCC)        Medications:  Reviewed  Infusion Medications    propofol 25 mcg/kg/min (10/22/21 8188)    fentaNYL 50 mcg/hr (10/22/21 4672)    sodium chloride      sodium chloride      dexmedetomidine (PRECEDEX) IV infusion Stopped (10/18/21 1940)    dextrose      sodium chloride 100 mL/hr at 10/21/21 0506     Scheduled Medications    vancomycin  2,000 mg IntraVENous Once    lidocaine PF  5 mL IntraDERmal Once    sodium chloride flush  5-40 mL IntraVENous 2 times per day    vancomycin  1,250 mg IntraVENous Q12H    ipratropium  4 puff Inhalation Q4H    And    albuterol sulfate HFA  4 puff Inhalation Q4H    insulin lispro  0-12 Units SubCUTAneous Q4H    atorvastatin  80 mg Oral Nightly    aspirin  300 mg Rectal Daily    dexamethasone 10 mg IntraVENous Q24H    sodium chloride flush  5-40 mL IntraVENous 2 times per day    thiamine  100 mg Oral Daily    multivitamin  1 tablet Oral Daily    famotidine (PEPCID) injection  20 mg IntraVENous BID    piperacillin-tazobactam  3,375 mg IntraVENous Q8H    insulin glargine  10 Units SubCUTAneous Nightly    zinc sulfate  50 mg Oral Daily    ascorbic acid  1,000 mg Oral Daily    baricitinib  4 mg Oral Daily    sodium chloride flush  5-40 mL IntraVENous 2 times per day    enoxaparin  30 mg SubCUTAneous BID     PRN Meds: sodium chloride flush, sodium chloride, ondansetron **OR** ondansetron, polyethylene glycol, sodium chloride flush, sodium chloride, lubrifresh P.M., glucose, dextrose, glucagon (rDNA), dextrose, sodium chloride flush, sodium chloride, acetaminophen **OR** acetaminophen, guaiFENesin-dextromethorphan, potassium chloride **OR** potassium alternative oral replacement **OR** potassium chloride      DVT Prophylaxis: Subcut enoxaparin  Diet: Diet NPO  Code Status: Full Code    Pt has a high probability of imminent or life-threatening deterioration requiring close monitoring, and highly complex decision-making and/or interventions of high intensity to assess, manipulate, and support his critical organ systems to prevent a likely inevitable decline which could occur if left untreated. 37 minutes of critical care time spent.      ____________________________________________________________________________    Subjective:   Overnight Events:   Overnight events noted with worsening hypoxia and subsequent intubation      Physical Exam:  BP 98/73   Pulse 115   Temp 100.7 °F (38.2 °C) (Rectal)   Resp 20   Ht 6' 3\" (1.905 m)   Wt 231 lb (104.8 kg)   SpO2 (!) 88%   BMI 28.87 kg/m²   General appearance: No apparent distress, appears stated age and cooperative. HEENT: Normocephalic, atraumatic, MMM, No sclera icterus/conjuctival palor  Neck: Supple, no thyromegally.  No jugular venous distention. Respiratory:  Normal respiratory effort. Clear to auscultation. On the vent  Cardiovascular: S1/S2 without murmurs, rubs or gallops. RRR  Abdomen: Soft, non-tender, non-distended, bowel sounds present. Musculoskeletal: No clubbing, cyanosis or edema bilaterally. Skin: Skin color, texture, turgor normal.  No rashes or lesions. Neurologic: Intubated and sedated      Intake/Output Summary (Last 24 hours) at 10/22/2021 1232  Last data filed at 10/22/2021 0557  Gross per 24 hour   Intake --   Output 1350 ml   Net -1350 ml       Labs:   Recent Labs     10/20/21  1108 10/21/21  1157 10/22/21  1123   WBC 15.4* 20.4* 36.9*   HGB 18.0 19.1* 19.5*   HCT 54.0* 56.2* 62.6*    243 353      Recent Labs     10/20/21  1108 10/21/21  1157    148*   K 4.6 4.6   * 116*   CO2 20* 16*   BUN 25* 30*   CREATININE 0.7* 0.6*   CALCIUM 8.9 9.1   PHOS 3.7 3.8   AST 60* 54*   * 119*   BILIDIR 0.4* 0.5*   BILITOT 1.0 1.2*   ALKPHOS 94 103     No results for input(s): CKTOTAL, TROPONINI in the last 72 hours. Urinalysis:    Lab Results   Component Value Date    NITRU NEGATIVE 10/21/2021    WBCUA 6 10/21/2021    BACTERIA NEGATIVE 10/21/2021    RBCUA 251 10/21/2021    BLOODU LARGE 10/21/2021    SPECGRAV 1.029 10/21/2021       Radiology:  XR CHEST PORTABLE   Final Result   New moderate-sized left pneumothorax and stable trace right-sided   pneumothorax. No mediastinal shift. Extensive pneumomediastinum and   subcutaneous emphysema persists. Stable diffuse bilateral pulmonary infiltrates. Satisfactory position of endotracheal tube      The findings were sent to the Radiology Results [address] at 10:51   am on 10/22/2021to be communicated to a licensed caregiver. XR CHEST PORTABLE   Final Result   Interval placement of the endotracheal tube with the tip about the clavicular   heads, satisfactory.       Redemonstration of extensive pneumomediastinum and subcutaneous emphysema as   well as suggestion of the biapical pneumothorax. The amount of   pneumomediastinum and subcutaneous emphysema appears to have increased when   compared to the prior study. Redemonstration of bilateral patchy areas of airspace opacities reflecting   multifocal pneumonia. XR CHEST PORTABLE   Final Result   Redemonstration of imaging features suggestive of pneumomediastinum and   subcutaneous emphysema is of the soft tissues of the neck and chest wall,   which appear to have increased when compared to the prior study. Small   biapical pneumothorax redemonstrated. Redemonstration of bilateral patchy areas of airspace opacities, reflecting   multifocal pneumonia, which appear to have slightly improved when compared to   the prior study. XR CHEST PORTABLE   Preliminary Result   Worsening pulmonary opacities particularly in the right mid and lower lung   field. Pneumomediastinum. Small bilateral apical pneumothoraces. There has been a   slight decrease in the size of the pneumothoraces when compared to the prior   study. XR CHEST PORTABLE   Final Result   1. Development of pneumomediastinum and bilateral pneumothoraces since the   10/18/2021 exam.   2. Diffuse bilateral lung infiltrates, compatible with COVID pneumonia. CT HEAD WO CONTRAST   Final Result   1. Left cerebral large middle cerebral artery (MCA) vascular territory   subacute versus acute ischemia. 2. Recommend MRI brain with diffusion-weighted imaging for further evaluation. Critical results were called by Dr. Sarthak Patel to CLAUDIA Thomas on   10/20/2021 at 23:55. XR CHEST PORTABLE   Final Result   Multifocal infiltrates throughout the lungs bilaterally, mildly improved. XR CHEST PORTABLE   Final Result   Bilateral airspace opacities are seen with slight interval worsening.          CTA head neck with contrast    (Results Pending)   VL DUP CAROTID BILATERAL    (Results Pending) XR CHEST PORTABLE    (Results Pending)           Samira Breaux MD      Please excuse brevity and/or typos. This report was transcribed using voice recognition software. Every effort was made to ensure accuracy, however, inadvertent computerized transcription errors may be present.

## 2021-10-23 PROBLEM — E87.0 HYPERNATREMIA: Status: ACTIVE | Noted: 2021-01-01

## 2021-10-23 PROBLEM — E87.5 HYPERKALEMIA: Status: ACTIVE | Noted: 2021-01-01

## 2021-10-23 NOTE — PROGRESS NOTES
pulmonary      SUBJECTIVE: intubated on vent     OBJECTIVE    VITALS:  /72   Pulse 100   Temp 97.3 °F (36.3 °C) (Rectal)   Resp 19   Ht 6' 3\" (1.905 m)   Wt 231 lb (104.8 kg)   SpO2 93%   BMI 28.87 kg/m²   HEAD AND FACE EXAM:  No throat injection, no active exudate,no thrush  NECK EXAM;No JVD, no masses, symmetrical  CHEST EXAM; Expansion equal and symmetrical, no masses  LUNG EXAM; Good breath sounds bilaterally.  There are expiratory wheezes both lungs, there are crackles at both lung bases  CARDIOVASCULAR EXAM: Positive S1 and S2, no S3 or S4, no clicks ,no murmurs  RIGHT AND LEFT LOWER EXTRIMITY EXAM: No edema, no swelling, no inflamation            LABS   Lab Results   Component Value Date    WBC 24.9 (H) 10/23/2021    HGB 17.6 10/23/2021    HCT 56.4 (H) 10/23/2021    .0 10/23/2021     10/23/2021     Lab Results   Component Value Date    CREATININE 2.9 (H) 10/23/2021    BUN 44 (H) 10/23/2021     (H) 10/23/2021    K 4.9 10/23/2021     10/23/2021    CO2 26 10/23/2021     No results found for: INR, PROTIME       Lab Results   Component Value Date    PHOS 8.1 10/23/2021    PHOS 13.3 10/22/2021    PHOS 3.8 10/21/2021        Recent Labs     10/22/21  0130 10/22/21  0315 10/23/21  1000   PH 7.44 7.14* 7.05*   PO2ART 53* 71* 76   LRJ9DPI 24.0* 57.0* 91.0*   O2SAT 87.0* 88.7* 93.0*         Wt Readings from Last 3 Encounters:   10/17/21 231 lb (104.8 kg)   10/10/21 260 lb (117.9 kg)   05/13/21 252 lb 12.8 oz (114.7 kg)          EXAMINATION:   ONE XRAY VIEW OF THE CHEST       10/23/2021 5:39 am       COMPARISON:   October 22, 2021       HISTORY:   ORDERING SYSTEM PROVIDED HISTORY: intubation   TECHNOLOGIST PROVIDED HISTORY:   Reason for exam:->intubation   Reason for Exam: intubation   Acuity: Unknown   Type of Exam: Unknown       FINDINGS:   Stable lines and tubes including endotracheal tube, nasogastric tube, right   upper extremity PICC, and left pleural pigtail catheter. Pily Hobbs cardiomediastinal silhouette.  Pneumomediastinum is present.  The pulmonary   system demonstrates bilateral airspace opacities, stable from prior.  Trace   left apical pneumothorax.       No new osseous abnormality.  Subcutaneous gas is identified throughout the   chest, slightly improved from the prior study.           Impression   1. Stable lines, tubes, and support devices. 2. Stable bilateral airspace opacities. 3. Improving pneumomediastinum. 4. Trace left apical pneumothorax.       .        ASSESMENT  Ac resp failure  covid pneumonia  Bact pneumonia  Left ptx,s/p ct placement        PLAN  1. cpm  2. Cont full vent support  3.  Tube feed    10/23/2021  Charmaine Villarreal MD, M.D.

## 2021-10-23 NOTE — PROGRESS NOTES
Hospitalist Progress Note      PCP: No primary care provider on file. Date of Admission: 10/14/2021    LOS: 9    Chief Complaint:   Chief Complaint   Patient presents with    Shortness of Breath     dx with covid 4 days ago        Case Summary:   51-year-old gentleman with history of alcohol abuse, hypertension, hyperlipidemia who presented with worsening cough, shortness of breath, fevers with chills, vomiting, diarrhea, poor appetite, generalized malaise and weakness found to have COVID-19 pneumonia and acute respiratory failure with hypoxia complicated by lack left MCA stroke, electrolyte abnormalities and alcohol withdrawals. He decompensated the night of 10/21/2021 with hypoxia and sats of 84% on BiPAP and was found to have pneumomediastinum with subcutaneous emphysema and small biapical pneumothoraces. He was intubated 10/21/2012    Active Hospital Problems    Diagnosis Date Noted    Hypernatremia [E87.0] 10/23/2021    Hyperkalemia [E87.5] 10/23/2021    Pneumomediastinum ([address] [J98.2] 10/22/2021    Subcutaneous emphysema ([address] [T79. 7XXA] 10/22/2021    Biapical Pneumothorax [J93.9] 10/22/2021    Acute ischemic left MCA stroke [facility]) [I63.512] 10/21/2021    Right flaccid hemiplegia (HCC) [G81.01] 10/21/2021    Leukocytosis [D72.829] 10/21/2021    Alcohol withdrawal (HCC) [F10.239] 10/19/2021    Acute respiratory failure with hypoxia (HCC) [J96.01] 10/16/2021    Hyperglycemia [R73.9] 10/16/2021    Pneumonia due to COVID-19 virus [U07.1, J12.82] 10/14/2021         Principal Problem:    Pneumonia due to COVID-19 virus and Acute respiratory failure with hypoxia: Remains intubated with sats of 95%.   Had prone ventilation yesterday now supine improved oxygenation  -Continue dexamethasone  -Continue breathing therapy with cardiopulmonary protocol  -Continue empiric antibiotics  -Pulmonology following with recommendation      Biapical Pneumothorax with Pneumomediastinum and Subcutaneous emphysema: Status post left chest drain for pneumothorax.  -We will monitor      Acute ischemic left MCA stroke with Right flaccid hemiplegia: CT brain with left MCA vascular territory subacute versus acute ischemia. Carotid Dopplers with no stenosis  -Continue on rectal aspirin and statin  -Await echocardiogram      Acute kidney injury: Peak creatinine 3.4. Received dialysis yesterday. Improved electrolytes. -Nephrology following with recommendation    Active Problems:    Hyperglycemia: On sliding scale insulin    Leukocytosis: Improving on empiric antibiotics    Electrolyte abnormality with hypernatremia and Hyperkalemia: Improved with dialysis. We will continue to monitor    Resolved Problems:    Hyponatremia    Hypokalemia    Alcohol withdrawal (HCC)    Pt has a high probability of imminent or life-threatening deterioration requiring close monitoring, and highly complex decision-making and/or interventions of high intensity to assess, manipulate, and support his critical organ systems to prevent a likely inevitable decline which could occur if left untreated. 31 minutes of critical care time spent.       Medications:  Reviewed  Infusion Medications    propofol 25 mcg/kg/min (10/23/21 0944)    fentaNYL 50 mcg/hr (10/22/21 2011)    sodium chloride      IV infusion builder 100 mL/hr at 10/23/21 0304    sodium chloride      dexmedetomidine (PRECEDEX) IV infusion Stopped (10/18/21 1940)    dextrose      sodium chloride 100 mL/hr at 10/21/21 0553     Scheduled Medications    lidocaine PF  5 mL IntraDERmal Once    sodium chloride flush  5-40 mL IntraVENous 2 times per day    ipratropium  4 puff Inhalation Q4H    And    albuterol sulfate HFA  4 puff Inhalation Q4H    insulin lispro  0-12 Units SubCUTAneous Q4H    piperacillin-tazobactam  3,375 mg IntraVENous Q12H    baricitinib  1 mg Oral Daily    vancomycin (VANCOCIN) intermittent dosing (placeholder)   Other See Admin Instructions    atorvastatin  80 mg Oral Nightly    aspirin  300 mg Rectal Daily    dexamethasone  10 mg IntraVENous Q24H    sodium chloride flush  5-40 mL IntraVENous 2 times per day    thiamine  100 mg Oral Daily    multivitamin  1 tablet Oral Daily    famotidine (PEPCID) injection  20 mg IntraVENous BID    insulin glargine  10 Units SubCUTAneous Nightly    zinc sulfate  50 mg Oral Daily    ascorbic acid  1,000 mg Oral Daily    sodium chloride flush  5-40 mL IntraVENous 2 times per day    enoxaparin  30 mg SubCUTAneous BID     PRN Meds: sodium chloride flush, sodium chloride, ondansetron **OR** ondansetron, polyethylene glycol, sodium chloride flush, sodium chloride, lubrifresh P.M., glucose, dextrose, glucagon (rDNA), dextrose, sodium chloride flush, sodium chloride, acetaminophen **OR** acetaminophen, guaiFENesin-dextromethorphan, [DISCONTINUED] potassium chloride **OR** [DISCONTINUED] potassium alternative oral replacement **OR** potassium chloride      DVT Prophylaxis: Subcut enoxaparin  Diet: Diet NPO  Code Status: Full Code    Prognosis guarded    ____________________________________________________________________________    Subjective:   Overnight Events:   Uneventful overnight  Was prone last night now supine this morning  Improved FiO2      Physical Exam:  /72   Pulse 100   Temp 97.3 °F (36.3 °C) (Rectal)   Resp 19   Ht 6' 3\" (1.905 m)   Wt 231 lb (104.8 kg)   SpO2 93%   BMI 28.87 kg/m²   General appearance: No apparent distress, appears stated age and intubated and sedated  HEENT: Normocephalic, atraumatic, MMM, No sclera icterus/conjuctival palor  Neck: Supple, no thyromegally. No jugular venous distention. Respiratory:  Normal respiratory effort. Clear to auscultation, no Rales/Wheezes/Rhonchi. On the vent  Cardiovascular: S1/S2 without murmurs, rubs or gallops. RRR  Abdomen: Soft, non-tender, non-distended, bowel sounds present. Musculoskeletal: No clubbing, cyanosis or edema bilaterally.     Skin: Skin color, texture, turgor normal.  No rashes or lesions. Neurologic: Intubated and sedated on the vent      Intake/Output Summary (Last 24 hours) at 10/23/2021 1148  Last data filed at 10/23/2021 0509  Gross per 24 hour   Intake 950 ml   Output 190 ml   Net 760 ml       Labs:   Recent Labs     10/21/21  1157 10/22/21  1123 10/23/21  0326   WBC 20.4* 36.9* 24.9*   HGB 19.1* 19.5* 17.6   HCT 56.2* 62.6* 56.4*    353 206      Recent Labs     10/21/21  1157 10/21/21  1157 10/22/21  1240 10/22/21  1950 10/23/21  0326   *   < > 153* 148* 147*   K 4.6   < > 6.6* 7.0* 4.9   *   < > 115* 110 104   CO2 16*   < > 19* 19* 26   BUN 30*   < > 64* 71* 44*   CREATININE 0.6*   < > 3.4* 3.2* 2.9*   CALCIUM 9.1   < > 8.6 8.0* 8.0*   PHOS 3.8  --  13.3*  --  8.1*   AST 54*  --  44*  --  40*   *  --  102*  --  85*   BILIDIR 0.5*  --  3.6*  --  3.6*   BILITOT 1.2*  --  3.7*  --  4.0*   ALKPHOS 103  --  105  --  99    < > = values in this interval not displayed. No results for input(s): Helene Dang in the last 72 hours. Urinalysis:    Lab Results   Component Value Date    NITRU NEGATIVE 10/22/2021    WBCUA 6 10/22/2021    BACTERIA FEW 10/22/2021    RBCUA 427 10/22/2021    BLOODU LARGE 10/22/2021    SPECGRAV 1.019 10/22/2021       Radiology:  XR CHEST PORTABLE   Preliminary Result   1. Stable lines, tubes, and support devices. 2. Stable bilateral airspace opacities. 3. Improving pneumomediastinum. 4. Trace left apical pneumothorax. VL DUP CAROTID BILATERAL   Final Result   The right internal carotid artery demonstrates 0-50% stenosis. The visualized left internal carotid artery demonstrates 0-50% stenosis. Note that the distal cervical left internal carotid artery is not visualized   due to overlying support equipment. Bilateral vertebral arteries are patent with flow in the normal direction.          IR NONTUNNELED VASCULAR CATHETER > 5 YEARS   Final Result   Successful ultrasound guided non-tunneled right common femoral vein   triple-lumen temporary dialysis catheter placement at the ICU bedside         IR CHEST TUBE INSERTION   Final Result   Addendum 1 of 1   ADDENDUM:   5 sonographic images were obtained of the left anterior hemithorax to    confirm   the presence of a left anterior pneumothorax. Final   Successful ultrasound guided placement of a left-sided 12 [place] locking   pigtail chest tube at the ICU bedside         XR CHEST PORTABLE   Final Result   1. Small bore left thoracostomy tube now present with near complete   resolution of the left pneumothorax. 2. Similar subcutaneous emphysema and pneumomediastinum. 3. Similar appearance of diffuse bilateral airspace opacities. XR ABDOMEN (KUB) (SINGLE AP VIEW)   Final Result   1. Dual-lumen dialysis catheter noted on the right with tip at the expected   location of the proximal right common iliac vein. XR CHEST PORTABLE   Final Result   New moderate-sized left pneumothorax and stable trace right-sided   pneumothorax. No mediastinal shift. Extensive pneumomediastinum and   subcutaneous emphysema persists. Stable diffuse bilateral pulmonary infiltrates. Satisfactory position of endotracheal tube      The findings were sent to the Radiology Results [address] at 10:51   am on 10/22/2021to be communicated to a licensed caregiver. XR CHEST PORTABLE   Final Result   Interval placement of the endotracheal tube with the tip about the clavicular   heads, satisfactory. Redemonstration of extensive pneumomediastinum and subcutaneous emphysema as   well as suggestion of the biapical pneumothorax. The amount of   pneumomediastinum and subcutaneous emphysema appears to have increased when   compared to the prior study. Redemonstration of bilateral patchy areas of airspace opacities reflecting   multifocal pneumonia.          XR CHEST PORTABLE   Final Result Redemonstration of imaging features suggestive of pneumomediastinum and   subcutaneous emphysema is of the soft tissues of the neck and chest wall,   which appear to have increased when compared to the prior study. Small   biapical pneumothorax redemonstrated. Redemonstration of bilateral patchy areas of airspace opacities, reflecting   multifocal pneumonia, which appear to have slightly improved when compared to   the prior study. XR CHEST PORTABLE   Preliminary Result   Worsening pulmonary opacities particularly in the right mid and lower lung   field. Pneumomediastinum. Small bilateral apical pneumothoraces. There has been a   slight decrease in the size of the pneumothoraces when compared to the prior   study. XR CHEST PORTABLE   Final Result   1. Development of pneumomediastinum and bilateral pneumothoraces since the   10/18/2021 exam.   2. Diffuse bilateral lung infiltrates, compatible with COVID pneumonia. CT HEAD WO CONTRAST   Final Result   1. Left cerebral large middle cerebral artery (MCA) vascular territory   subacute versus acute ischemia. 2. Recommend MRI brain with diffusion-weighted imaging for further evaluation. Critical results were called by Dr. Argelia Fletcher to CLAUDIA Cosmechristine Snyderms on   10/20/2021 at 23:55. XR CHEST PORTABLE   Final Result   Multifocal infiltrates throughout the lungs bilaterally, mildly improved. XR CHEST PORTABLE   Final Result   Bilateral airspace opacities are seen with slight interval worsening. CTA head neck with contrast    (Results Pending)           Samira Meeks MD      Please excuse brevity and/or typos. This report was transcribed using voice recognition software. Every effort was made to ensure accuracy, however, inadvertent computerized transcription errors may be present.

## 2021-10-23 NOTE — PROCEDURES
Pt tolerated 2hr HD poor. Hypotensive requiring saline bolus and albumin administration. No UF. Vas Cath flushed, capped and Hep locked. Post TX labs drawn and sent    Patient Name: Editha Crigler  Patient : 1955  MRN: [id number]     Acct: [de-identified]  Date of Admission: 10/14/2021  Room/Bed: -A  Code Status:  Full Code  Allergies: No Known Allergies  Diagnosis:    Patient Active Problem List   Diagnosis    Pneumonia due to COVID-19 virus    Acute respiratory failure with hypoxia ([address]    Hyperglycemia    Alcohol withdrawal ([address]    Acute ischemic left MCA stroke ([address]    Right flaccid hemiplegia ([address]    Leukocytosis    Pneumomediastinum ([address]    Subcutaneous emphysema ([address]    Biapical Pneumothorax         Treatment:  Hemodialysis 1:1  Priority: 1st Time Acute  Location: ICU    Diabetic: No  NPO: Yes  Isolation Precautions: Covid-19     Consent for Treatment Verified: Yes  Blood Consent Verified: Not Applicable     Safety Verified: Identify (I), Consent (C), Equipment (E), HepB Status (B), Orders Complete (O), Access Verified (A) and Timeliness (T)  Time out performed prior to access at 0100 hours. Report Received from Primary RN at 0045 hours. Primary RN (First Initial, Last Name, Title):  Rudy MCGRAW  Incapacitated Nurse Education Completed: Yes     HBsAg ONLY:  Date Drawn: 2021       Results: Unknown  HBsAb:  Date Drawn:  2021       Results: Unknown    Order  Dialysis Bath  K+ (Potassium): 2  Ca+ (Calcium): 2.5  Na+ (Sodium): 138  HCO3 (Bicarb): 35     Na+ Modeling: Not Applicable  Dialyzer: [id number]  Dialysate Temperature (C):  36  Blood Flow Rate (BFR):  200   Dialysate Flow Rate (DFR):   400        Access to be Utilized   Access: Non-tunneled Catheter  Location: Femoral  Side: Right   Needle gauge:  Not Applicable  + Bruit/Thrill: Not Applicable    First Use X-ray Verified: Yes  OK to use line order: Yes    Site Assessment:  Signs and Symptoms of Infection/Inflammation: None  If yes: Not Applicable  Dressing: Dry and Intact  Site Prep: Medical Aseptic Technique  Dressing Changed this Treatment: No  If yes, by whom: Special Procedures  Date of Last Dressing Change: October 22, 2021  Antimicrobial Patch in place?: Yes  Red Alcohol Caps in place?: Yes  Gauze Dressing?: No  Non Dialysis Use?: No  Comment:    Flows: Good, Patent  If access problem, who was notified:     Pre and Post-Assessment  Patient Vitals for the past 8 hrs:   Level of Consciousness Heart Rhythm Respiratory Quality/Effort O2 Device Bilateral Breath Sounds Skin Color Skin Condition/Temp Abdomen Inspection Bowel Sounds (All Quadrants) Edema Comments Pain Level   10/23/21 0002 Responds to Pain (2) -- Unlabored Ventilator -- Ecchymosis; Mottled generalized Cool;Dry Flat;Soft -- None -- --   10/23/21 0056 -- -- -- Ventilator -- -- -- -- -- -- -- --   10/23/21 0100 Responds to Pain (2) Regular Unlabored Ventilator Diminished Ecchymosis; Mottled generalized Cool;Dry Flat;Soft Hypoactive None Assessment completed, consent verified 0   10/23/21 0320 Responds to Pain (2) Regular Unlabored Ventilator Diminished Ecchymosis Cool;Dry Flat;Soft Hypoactive None tx completed 0     Labs  Recent Labs     10/21/21  1157 10/22/21  1123 10/23/21  0326   WBC 20.4* 36.9* 24.9*   HGB 19.1* 19.5* 17.6   HCT 56.2* 62.6* 56.4*    353 206                                                                  Recent Labs     10/21/21  1157 10/22/21  1240 10/22/21  1950   * 153* 148*   K 4.6 6.6* 7.0*   * 115* 110   CO2 16* 19* 19*   BUN 30* 64* 71*   CREATININE 0.6* 3.4* 3.2*   GLUCOSE 203* 396* 443*     IV Drips and Rate/Dose   propofol 25 mcg/kg/min (10/23/21 0415)    fentaNYL 50 mcg/hr (10/22/21 2011)    sodium chloride      IV infusion builder 100 mL/hr at 10/23/21 0304    sodium chloride      dexmedetomidine (PRECEDEX) IV infusion Stopped (10/18/21 1940)    dextrose      sodium chloride 100 mL/hr at 10/21/21 0553      Safety - Before each treatment:   Dialysis Machine No.: [id number]  RO Machine No.: [id number]  Dialyzer Lot No.: [id number]  RO Machine Log Sheet Completed: Yes  Machine Alarm Self Test: Completed; Passed (10/23/21 0100)  Machine Autotest: Completed, Passed  Air Foam Detector: Tested, Proper Function  Extracorporeal Circuit Tested for Integrity: Yes  Machine Conductivity: 13.7  Manual Conductivity: 13.8     Bicarbonate Concentrate Lot No.: [id number]  Acid Concentrate Lot No.: [id number]  Manual Ph: 7.4  Bleach Test (Neg): Yes  Bath Temperature: 96.8 °F (36 °C)  Tubing Lot#: [id number]  Conductivity Meter Serial #: [id number]  All Connections Secure?: Yes  Venous Parameters Set?: Yes  Arterial Parameters Set?: Yes  Saline Line Double Clamped?: Yes  Air Foam Detector Engaged?: Yes  Machine Functioning Alarm Free?  Yes  Prime Given: 200ml    Chlorine Testing - Before each treatment and every 4 hours:   Treatment  Treatment Number: 1  Time On: 0116  Time Off: 7629  Treatment Goal: 2hrs  Weight: 231 lb (104.8 kg) (10/17/21 0631)  1st check: less than 0.1 ppm at: 2350 hours  2nd check: less than 0.1 ppm at: 0238 hours  3rd check: Not Applicable  (if greater than 0.1 ppm, then check every 30 minutes from secondary)    Access Flows and Pressures  Patient Vitals for the past 8 hrs:   Blood Flow Rate (ml/min) Ultrafiltration Rate (ml/hr) Ultrafiltration Total Arterial Pressure (mmHg) Venous Pressure (mmHg) TMP Hemodialysis Conductivity DFR Comments Access Visible   10/23/21 0116 200 ml/min 250 ml/hr 0 ml -120 mmHg 140 0 13.7 400 tx initiated Yes   10/23/21 0130 200 ml/min 0 ml/hr 70 ml -120 mmHg 150 0 -- 400 UF stopped, 200 NS bolus given Yes   10/23/21 0145 200 ml/min 0 ml/hr 70 ml -120 mmHg 150 0 -- 400 250 NS bolus given Yes   10/23/21 0200 200 ml/min 0 ml/hr 70 ml -120 mmHg 150 0 -- 400 Albumin started Yes   10/23/21 0215 200 ml/min 0 ml/hr 70 ml -120 mmHg 150 0 -- 400 Albumin completed Yes   10/23/21 0230 200 ml/min 0 ml/hr 70 ml -120 mmHg 140 0 -- 400 Hospital RN rounding Yes   10/23/21 0245 200 ml/min 0 ml/hr 70 ml -120 mmHg 130 0 -- 400 Vitals stable no distress Yes   10/23/21 0300 200 ml/min 0 ml/hr 70 ml -120 mmHg 130 0 -- 400 no changes Yes   10/23/21 0315 200 ml/min 0 ml/hr 70 ml -120 mmHg 150 0 -- 400 Hospital RN rounding Yes   10/23/21 0318 200 ml/min 0 ml/hr 70 ml 10 mmHg 30 0 -- 400 tx completed Yes     Vital Signs  Patient Vitals for the past 8 hrs:   BP Temp Pulse Resp SpO2   10/22/21 2102 104/61 -- 104 16 93 %   10/22/21 2201 104/72 -- 103 17 92 %   10/22/21 2302 99/67 -- 103 17 (!) 87 %   10/23/21 0002 98/66 98.8 °F (37.1 °C) 103 17 92 %   10/23/21 0016 94/62 -- 103 17 91 %   10/23/21 0056 -- -- 102 17 92 %   10/23/21 0100 93/70 98.7 °F (37.1 °C) 102 17 92 %   10/23/21 0102 93/70 -- 102 17 92 %   10/23/21 0116 94/62 -- 101 16 93 %   10/23/21 0130 80/64 -- 101 16 92 %   10/23/21 0135 (!) 78/64 -- 102 15 92 %   10/23/21 0140 (!) 78/61 -- 103 16 90 %   10/23/21 0145 81/64 -- 103 14 93 %   10/23/21 0150 81/62 -- 102 15 91 %   10/23/21 0155 (!) 78/64 -- 101 14 94 %   10/23/21 0200 85/69 -- 101 15 93 %   10/23/21 0202 82/65 -- 101 14 94 %   10/23/21 0205 87/67 -- 101 14 94 %   10/23/21 0215 92/65 -- 100 14 94 %   10/23/21 0220 91/70 -- 99 14 95 %   10/23/21 0230 93/68 -- 98 15 95 %   10/23/21 0245 92/74 -- 96 14 96 %   10/23/21 0300 97/67 -- 96 14 96 %   10/23/21 0315 92/67 -- 94 13 97 %   10/23/21 0318 92/67 97.6 °F (36.4 °C) 94 13 97 %   10/23/21 0320 -- -- 93 13 97 %   10/23/21 0402 105/68 -- 90 20 98 %   10/23/21 0417 -- -- 88 20 98 %     Post-Dialysis  Arterial Catheter Locking Solution: Heparin (1000units:1ml)    Volume (ml): 1.4  Venous Catheter Locking Solution: Heparin (1000units:1ml)    Volume (ml): 1.4  Post-Treatment Procedures: Blood returned, Catheter capped, clamped and heparinized x 2 ports  Machine Disinfection Process: Acid/Vinegar Clean, Bleach, Exterior Machine Disinfection  Rinseback Volume (ml): 300 ml  Total Liters Processed (l/min): 23.2 l/min  Dialyzer Clearance: Lightly streaked  Duration of Treatment (minutes): 120 minutes  Heparin amount administered during treatment (units): 0 units  Hemodialysis Intake (ml): 950 ml  Hemodialysis Output (ml): 70 ml  NET Removed (ml): -880 ml  Tolerated Treatment: Poor  Patient Response to Treatment: hypotensive  Physician Notified?: Yes       Provider Notification  Provider Notification  Reason for Communication: Evaluate (hypotension) (10/23/21 0141)  Provider Name: Bridgett Kenney (10/23/21 0141)  Provider Notification: Physician (10/23/21 0141)  Method of Communication: Call (10/23/21 0141)  Response: See orders ([address] Bolus, Albumin orders) (10/23/21 0141)  Notification Time: 8762 (10/23/21 0141)     Handoff complete and report given to Primary RN at 0345 hours.   Primary RN (First Initial, Last Name, Title):  Sushant CUEVAS RN     Education  Person Educated: Patient   Knowledge Base: None  Barriers to Learning?: intubated/sedated  Preferred method of Learning: Oral  Topic(s): Access Care, Signs and Symptoms of Infection, Albumin, Procedural, Medications and Potassium   Teaching Tools: Explanation   Response to Education: Requires Follow-up     Electronically signed by Jonah Walls RN on 10/23/2021 at 4:19 AM

## 2021-10-23 NOTE — PROGRESS NOTES
[address] [facility]  consulted by Chanda Caro CNP for monitoring and adjustment. Indication for treatment: Sepsis  Goal trough: 15 mcg/mL    Ht Readings from Last 1 Encounters:   10/16/21 6' 3\" (1.905 m)     Wt Readings from Last 3 Encounters:   10/17/21 231 lb (104.8 kg)   10/10/21 260 lb (117.9 kg)   05/13/21 252 lb 12.8 oz (114.7 kg)        Pertinent Laboratory Values:   Temp Readings from Last 3 Encounters:   10/23/21 97.3 °F (36.3 °C) (Rectal)   10/10/21 99.3 °F (37.4 °C) (Oral)   05/13/21 96.9 °F (36.1 °C) (Infrared)     Recent Labs     10/21/21  1157 10/22/21  1123 10/23/21  0326   WBC 20.4* 36.9* 24.9*     Recent Labs     10/22/21  1240 10/22/21  1950 10/23/21  0326   BUN 64* 71* 44*   CREATININE 3.4* 3.2* 2.9*     Estimated Creatinine Clearance: 33 mL/min (A) (based on SCr of 2.9 mg/dL (H)). Intake/Output Summary (Last 24 hours) at 10/23/2021 1211  Last data filed at 10/23/2021 0509  Gross per 24 hour   Intake 950 ml   Output 190 ml   Net 760 ml       VANCOMYCIN TROUGH:  No results for input(s): VANCOTROUGH in the last 72 hours. VANCOMYCIN RANDOM:    Recent Labs     10/23/21  0844   VANCORANDOM 22.1       Pertinent Cultures:  Date    Source    Results  10/22   Blood    Ordered    Assessment:  · WBC and temperature: WBC improved down to 24.9, pt remains afebrile  · SCr, BUN, and urine output:  · JOANIE, pt had HD early this am, tolerated poorly, may need HD in the next 1-2 days  · 0.6 -> 3.4, SCR slightly improved @3.2 today  · Small amount of documented UOP  · Day(s) of therapy: 2  · Vancomycin concentration:  · 10/23 - 22.1, post HD level    Plan:  · Due to JOANIE and dialysis, continue with intermittent dosing based on vanco levels. · Level post HD this am supra-therapeutic, no vanco doses to be given today  · Possible HD in the next 1-2 days, will recheck the vanco level tomorrow am just in case.   · Pharmacy will continue to monitor patient and adjust therapy as indicated    VANCOMYCIN CONCENTRATION SCHEDULED FOR 10/24 @ 0600    Thank you for the consult,  Iron Gay, [facility]  10/23/21  12:19 PM

## 2021-10-23 NOTE — PROGRESS NOTES
Nephrology Progress Note  10/23/2021 9:50 AM  Subjective: Interval History: Keenan Shanks is a 77 y.o. male with sedated on the ventilator tolerated dialysis early this morning last night. I spoke with his daughter-in-law at bedside.         Data:   Scheduled Meds:   lidocaine PF  5 mL IntraDERmal Once    sodium chloride flush  5-40 mL IntraVENous 2 times per day    ipratropium  4 puff Inhalation Q4H    And    albuterol sulfate HFA  4 puff Inhalation Q4H    insulin lispro  0-12 Units SubCUTAneous Q4H    piperacillin-tazobactam  3,375 mg IntraVENous Q12H    baricitinib  1 mg Oral Daily    vancomycin (VANCOCIN) intermittent dosing (placeholder)   Other See Admin Instructions    atorvastatin  80 mg Oral Nightly    aspirin  300 mg Rectal Daily    dexamethasone  10 mg IntraVENous Q24H    sodium chloride flush  5-40 mL IntraVENous 2 times per day    thiamine  100 mg Oral Daily    multivitamin  1 tablet Oral Daily    famotidine (PEPCID) injection  20 mg IntraVENous BID    insulin glargine  10 Units SubCUTAneous Nightly    zinc sulfate  50 mg Oral Daily    ascorbic acid  1,000 mg Oral Daily    sodium chloride flush  5-40 mL IntraVENous 2 times per day    enoxaparin  30 mg SubCUTAneous BID     Continuous Infusions:   propofol 25 mcg/kg/min (10/23/21 0944)    fentaNYL 50 mcg/hr (10/22/21 2011)    sodium chloride      IV infusion builder 100 mL/hr at 10/23/21 0304    sodium chloride      dexmedetomidine (PRECEDEX) IV infusion Stopped (10/18/21 1940)    dextrose      sodium chloride 100 mL/hr at 10/21/21 0553         CBC   Recent Labs     10/21/21  1157 10/22/21  1123 10/23/21  0326   WBC 20.4* 36.9* 24.9*   HGB 19.1* 19.5* 17.6   HCT 56.2* 62.6* 56.4*    353 206      BMP   Recent Labs     10/21/21  1157 10/21/21  1157 10/22/21  1240 10/22/21  1950 10/23/21  0326   *   < > 153* 148* 147*   K 4.6   < > 6.6* 7.0* 4.9   *   < > 115* 110 104   CO2 16*   < > 19* 19* 26   PHOS 3.8 --  13.3*  --  8.1*   BUN 30*   < > 64* 71* 44*   CREATININE 0.6*   < > 3.4* 3.2* 2.9*    < > = values in this interval not displayed. Hepatic:   Recent Labs     10/21/21  1157 10/22/21  1240 10/23/21  0326   AST 54* 44* 40*   * 102* 85*   BILITOT 1.2* 3.7* 4.0*   ALKPHOS 103 105 99     Troponin: No results for input(s): TROPONINI in the last 72 hours. BNP: No results for input(s): BNP in the last 72 hours. Lipids: No results for input(s): CHOL, HDL in the last 72 hours. Invalid input(s): LDLCALCU  ABGs:   Lab Results   Component Value Date    PO2ART 71 10/22/2021    EYC7PNU 57.0 10/22/2021     INR: No results for input(s): INR in the last 72 hours.   Renal Labs  Albumin:    Lab Results   Component Value Date    LABALBU 3.9 10/23/2021    LABALBU 3.9 10/23/2021     Calcium:    Lab Results   Component Value Date    CALCIUM 8.0 10/23/2021     Phosphorus:    Lab Results   Component Value Date    PHOS 8.1 10/23/2021     U/A:    Lab Results   Component Value Date    NITRU NEGATIVE 10/22/2021    COLORU RED 10/22/2021    WBCUA 6 10/22/2021    RBCUA 427 10/22/2021    MUCUS FEW 10/22/2021    TRICHOMONAS NONE SEEN 10/22/2021    BACTERIA FEW 10/22/2021    CLARITYU CLOUDY 10/22/2021    SPECGRAV 1.019 10/22/2021    UROBILINOGEN NEGATIVE 10/22/2021    BILIRUBINUR NEGATIVE 10/22/2021    BLOODU LARGE 10/22/2021    KETUA NEGATIVE 10/22/2021     ABG:    Lab Results   Component Value Date    AVZ1QKW 57.0 10/22/2021    PO2ART 71 10/22/2021    JRK5WKO 19.4 10/22/2021     HgBA1c:    Lab Results   Component Value Date    LABA1C 7.1 10/20/2021     Microalbumen/Creatinine ratio:  No components found for: RUCREAT  TSH:  No results found for: TSH  IRON:  No results found for: IRON  Iron Saturation:  No components found for: PERCENTFE  TIBC:  No results found for: TIBC  FERRITIN:    Lab Results   Component Value Date    FERRITIN 1,746 10/14/2021     RPR:  No results found for: RPR  GISEL:  No results found for: ANATITER, GISEL  24 Hour Urine for Creatinine Clearance:  No components found for: CREAT4, UHRS10, UTV10      Objective:   I/O: 10/22 0701 - 10/23 0700  In: 950   Out: 190 [Urine:85]  I/O last 3 completed shifts: In: 950   Out: 190 [Urine:85; Emesis/NG output:30; Chest Tube:5]  No intake/output data recorded. Vitals: /72   Pulse 92   Temp 97.3 °F (36.3 °C) (Rectal)   Resp 16   Ht 6' 3\" (1.905 m)   Wt 231 lb (104.8 kg)   SpO2 98%   BMI 28.87 kg/m²  {  General appearance: Sedated ET tube  HEENT: Head: Normal, normocephalic, atraumatic. Neck: supple, symmetrical, trachea midline  Lungs: diminished breath sounds bilaterally  Heart: S1, S2 normal  Abdomen: abnormal findings:  soft nt  Extremities: edema trace  Neurologic: Mental status: alertness: Sedated        Assessment and Plan:      IMP:  1. Acute renal failure from ATN  2. Metabolic acidosis with hyperkalemia  3. Hypernatremia  #4 COVID-19 positive  5. Respiratory distress with pneumothorax  6 leukocytosis  7. Acute ischemic left MCA stroke  8. History of alcohol use with possible withdrawal  9. Type 2 diabetes    Plan     #1 renal function monitor post dialysis recheck labs later today. May need further dialysis in the next 1 or 2 days. 2.  Recheck ABG and lab work later today maintain bicarb drip  3. Sodium better with dialysis  4. Was on prone ventilation now on his back today monitor oxygenation vent    #5 status post chest tube  6. Maintain current antibiotic regimen  7. Monitor neuro status in the above setting   #8 supportive care watch for withdrawal  9.   Sliding scale insulin           Ray Agrawal MD, MD

## 2021-10-23 NOTE — PROGRESS NOTES
10/23/21 0417   Vent Information   Vent Type 980   Vent Mode AC/VC   Vt Ordered 500 mL   Rate Set 16 bmp   Peak Flow 55 L/min   Pressure Support 0 cmH20   FiO2  100 %   SpO2 98 %   SpO2/FiO2 ratio 98   Sensitivity 3   PEEP/CPAP 12   I Time/ I Time % 0 s   Vent Patient Data   High Peep/I Pressure 0   Peak Inspiratory Pressure 25 cmH2O   Mean Airway Pressure 17 cmH20   Rate Measured 19 br/min   Vt Exhaled 660 mL   Minute Volume 9.16 Liters   I:E Ratio 1:2.80

## 2021-10-23 NOTE — PROGRESS NOTES
10/23/21 0056   Vent Information   Vent Type 980   Vent Mode AC/VC   Vt Ordered 500 mL   Rate Set 16 bmp   Peak Flow 55 L/min   Pressure Support 0 cmH20   FiO2  100 %   SpO2 92 %   SpO2/FiO2 ratio 92   Sensitivity 3   PEEP/CPAP 12   I Time/ I Time % 0 s   Humidification Source HME   Vent Patient Data   High Peep/I Pressure 0   Peak Inspiratory Pressure 27 cmH2O   Mean Airway Pressure 16 cmH20   Rate Measured 17 br/min   Vt Exhaled 509 mL   Minute Volume 8.69 Liters   I:E Ratio 1:2.80

## 2021-10-24 PROBLEM — R74.01 TRANSAMINITIS: Status: ACTIVE | Noted: 2021-01-01

## 2021-10-24 PROBLEM — I48.91 ATRIAL FIBRILLATION WITH RVR (HCC): Status: ACTIVE | Noted: 2021-01-01

## 2021-10-24 NOTE — PROGRESS NOTES
10/24/21 0731   Vent Information   $Ventilation $Subsequent Day   Skin Assessment Clean, dry, & intact   Vent Type 980   Vent Mode AC/VC   Vt Ordered 550 mL   Rate Set 22 bmp   Peak Flow 80 L/min   Pressure Support 0 cmH20   FiO2  90 %   Sensitivity 3   PEEP/CPAP 12   I Time/ I Time % 0 s   Vent Patient Data   High Peep/I Pressure 0   Peak Inspiratory Pressure 31 cmH2O   Mean Airway Pressure 17 cmH20   Rate Measured 23 br/min   Vt Exhaled 576 mL   Minute Volume 13.5 Liters   I:E Ratio 1:2.40   Cough/Sputum   Sputum How Obtained Endotracheal;Suctioned   $Obtained Sample $Induced Sputum   Cough Non-productive   Frequency Infrequent   Sputum Amount None   Spontaneous Breathing Trial (SBT) RT Doc   Pulse 108   Additional Respiratory  Assessments   Resp 24   Alarm Settings   High Pressure Alarm 40 cmH2O   Press Low Alarm 5 cmH2O   Delay Alarm 20 sec(s)   Low Minute Volume Alarm 2.5 L/min   Apnea (secs) 20 secs   High Respiratory Rate 40 br/min   Low Exhaled Vt  250 mL   Patient Observation   Observations 23@ lip    10 mg albuterol given via aero -neb

## 2021-10-24 NOTE — PROGRESS NOTES
CONCENTRATION SCHEDULED FOR 10/26 @ 0600    Thank you for the consult,  Rosa Becerra.  [facility], [address] [place] [address]  10/24/21  12:47 PM

## 2021-10-24 NOTE — PROGRESS NOTES
Nephrology Progress Note  10/24/2021 12:07 PM  Subjective:      Interval History: Blanca Hannon is a 77 y.o. male on vent and worse acidosis and K        Data:   Scheduled Meds:   sodium bicarbonate  100 mEq IntraVENous Once    albuterol        albumin human  25 g IntraVENous Once    ipratropium-albuterol        lidocaine PF  5 mL IntraDERmal Once    sodium chloride flush  5-40 mL IntraVENous 2 times per day    ipratropium  4 puff Inhalation Q4H    And    albuterol sulfate HFA  4 puff Inhalation Q4H    insulin lispro  0-12 Units SubCUTAneous Q4H    piperacillin-tazobactam  3,375 mg IntraVENous Q12H    baricitinib  1 mg Oral Daily    vancomycin (VANCOCIN) intermittent dosing (placeholder)   Other See Admin Instructions    atorvastatin  80 mg Oral Nightly    aspirin  300 mg Rectal Daily    sodium chloride flush  5-40 mL IntraVENous 2 times per day    thiamine  100 mg Oral Daily    multivitamin  1 tablet Oral Daily    famotidine (PEPCID) injection  20 mg IntraVENous BID    insulin glargine  10 Units SubCUTAneous Nightly    zinc sulfate  50 mg Oral Daily    ascorbic acid  1,000 mg Oral Daily    sodium chloride flush  5-40 mL IntraVENous 2 times per day    enoxaparin  30 mg SubCUTAneous BID     Continuous Infusions:   norepinephrine 8 mcg/min (10/24/21 1107)    dilTIAZem (CARDIZEM) 125 mg in dextrose 5% 125 mL infusion 10 mg/hr (10/24/21 1207)    propofol 20 mcg/kg/min (10/24/21 1028)    fentaNYL 50 mcg/hr (10/24/21 0615)    sodium chloride      IV infusion builder 100 mL/hr at 10/24/21 0615    sodium chloride      dexmedetomidine (PRECEDEX) IV infusion Stopped (10/18/21 1940)    dextrose      sodium chloride 100 mL/hr at 10/21/21 0553         CBC   Recent Labs     10/22/21  1123 10/23/21  0326 10/24/21  0440   WBC 36.9* 24.9* 18.6*   HGB 19.5* 17.6 13.9   HCT 62.6* 56.4* 44.7    206 147      BMP   Recent Labs     10/22/21  1240 10/22/21  1240 10/22/21  1950 10/23/21  0326 10/24/21  0440   *   < > 148* 147* 145   K 6.6*   < > 7.0* 4.9 6.1*   *   < > 110 104 104   CO2 19*   < > 19* 26 22   PHOS 13.3*  --   --  8.1* 11.2*   BUN 64*   < > 71* 44* 101*   CREATININE 3.4*   < > 3.2* 2.9* 5.7*    < > = values in this interval not displayed. Hepatic:   Recent Labs     10/22/21  1240 10/23/21  0326 10/24/21  0440   AST 44* 40* 51*   * 85* 67*   BILITOT 3.7* 4.0* 1.9*   ALKPHOS 105 99 61     Troponin: No results for input(s): TROPONINI in the last 72 hours. BNP: No results for input(s): BNP in the last 72 hours. Lipids: No results for input(s): CHOL, HDL in the last 72 hours. Invalid input(s): LDLCALCU  ABGs:   Lab Results   Component Value Date    PO2ART 83 10/24/2021    XQT7DCZ 80.0 10/24/2021     INR: No results for input(s): INR in the last 72 hours.   Renal Labs  Albumin:    Lab Results   Component Value Date    LABALBU 2.9 10/24/2021    LABALBU 2.9 10/24/2021     Calcium:    Lab Results   Component Value Date    CALCIUM 6.1 10/24/2021     Phosphorus:    Lab Results   Component Value Date    PHOS 11.2 10/24/2021     U/A:    Lab Results   Component Value Date    NITRU NEGATIVE 10/22/2021    COLORU RED 10/22/2021    WBCUA 6 10/22/2021    RBCUA 427 10/22/2021    MUCUS FEW 10/22/2021    TRICHOMONAS NONE SEEN 10/22/2021    BACTERIA FEW 10/22/2021    CLARITYU CLOUDY 10/22/2021    SPECGRAV 1.019 10/22/2021    UROBILINOGEN NEGATIVE 10/22/2021    BILIRUBINUR NEGATIVE 10/22/2021    BLOODU LARGE 10/22/2021    KETUA NEGATIVE 10/22/2021     ABG:    Lab Results   Component Value Date    SSK2IYV 80.0 10/24/2021    PO2ART 83 10/24/2021    EJG9YDA 23.7 10/24/2021     HgBA1c:    Lab Results   Component Value Date    LABA1C 7.1 10/20/2021     Microalbumen/Creatinine ratio:  No components found for: RUCREAT  TSH:  No results found for: TSH  IRON:  No results found for: IRON  Iron Saturation:  No components found for: PERCENTFE  TIBC:  No results found for: TIBC  FERRITIN:    Lab Results   Component Value Date    FERRITIN 1,746 10/14/2021     RPR:  No results found for: RPR  GISEL:  No results found for: ANATITER, GISEL  24 Hour Urine for Creatinine Clearance:  No components found for: CREAT4, UHRS10, UTV10      Objective:   I/O: 10/23 0701 - 10/24 0700  In: 5033.1 [I.V.:4549.5]  Out: -   I/O last 3 completed shifts: In: 4686.3 [I.V.:4549.5; IV Piggyback:136.8]  Out: -   I/O this shift:  In: 20 [I.V.:20]  Out: -   Vitals: BP (!) 83/53   Pulse 138   Temp 101.5 °F (38.6 °C) (Rectal)   Resp 20   Ht 6' 3\" (1.905 m)   Wt 231 lb (104.8 kg)   SpO2 94%   BMI 28.87 kg/m²  {  General appearance: Sedated ET tube  HEENT: Head: Normal, normocephalic, atraumatic. Neck: supple, symmetrical, trachea midline  Lungs: diminished breath sounds bilaterally  Heart: S1, S2 normal  Abdomen: abnormal findings:  soft nt  Extremities: edema trace  Neurologic: Mental status: alertness: Sedated        Assessment and Plan:      IMP:  1. Acute renal failure from ATN  2. Metabolic acidosis with hyperkalemia  3. Hypernatremia  #4 COVID-19 positive  5. Respiratory distress with pneumothorax  6 leukocytosis  7. Acute ischemic left MCA stroke  8. History of alcohol use with possible withdrawal  9.   Type 2 diabetes    Plan     1 do acute dialysis today and correct K and acidosis  2 I dw wife and she will dw his daughters about code status as aware acute ill  3 na correct with hd  4 if not tolerate hd well may need start crrt  5 wean vent If able and with chest tube  6 monitor neuro and for DT  7 ssi  Will monitor and overall poor prognosis           Adrian Oakes MD, MD

## 2021-10-24 NOTE — PROGRESS NOTES
dw wife car and agree cahnge to dnr cc-a code status and would like to see him one more time if he is felt to be dying

## 2021-10-24 NOTE — PROGRESS NOTES
10/24/21 1545   Vent Information   Vent Type 980   Vent Mode AC/VC   Vt Ordered 550 mL   Rate Set 22 bmp   Peak Flow 80 L/min   Pressure Support 0 cmH20   FiO2  90 %   SpO2 (!) 88 %   SpO2/FiO2 ratio 97.78   Sensitivity 3   PEEP/CPAP 12   I Time/ I Time % 0 s   Vent Patient Data   High Peep/I Pressure 0   Peak Inspiratory Pressure 28 cmH2O   Mean Airway Pressure 18 cmH20   Rate Measured 24 br/min   Vt Exhaled 482 mL   Minute Volume 14.7 Liters   I:E Ratio 1:1.40   Cough/Sputum   Sputum How Obtained Endotracheal   $Obtained Sample $Induced Sputum   Cough Non-productive   Frequency Occasional   Sputum Amount None   Spontaneous Breathing Trial (SBT) RT Doc   Pulse 113   Additional Respiratory  Assessments   Resp 21   Position Semi-Meyers's   Subglottic Suction Done?  Yes   Alarm Settings   High Pressure Alarm 40 cmH2O

## 2021-10-24 NOTE — PROGRESS NOTES
with recommendations      JOANIE with metabolic acidosis and hyperkalemia: Worsening renal function with creatinine at 5.7 this morning. Potassium climbed to 6.1 discussed with nephrology  -Nephrology planning on dialysis today to help correct the potassium  -Nephrology following with recommendation      Acute ischemic left MCA stroke and Right flaccid hemiplegia: CT brain with left MCA stroke. Carotid Dopplers with no stenosis. -Continue rectal aspirin and statin  -Await echocardiogram  -Neurology following      Atrial fibrillation with RVR [facility]): Noted with rapid ventricular response heart rate in the 140s this morning irregular. Await echocardiogram  -We will start on Cardizem drip and if blood pressure drops to reinstitute pressor support      Biapical Pneumothorax with Pneumomediastinum and Subcutaneous emphysema: Status post left chest drain for pneumothorax.  -We will monitor  -Pulmonology following      Active Problems:    Hyperglycemia: Sliding scale insulin    Leukocytosis: In the setting of massive stroke, infection with COVID-19 and possible superimposed bacterial infection. Trending down. Will monitor    Hypernatremia: Improved after dialysis. Hyperkalemia: At 6.1 this morning. Plan for dialysis with correction today    Transaminitis: Mild. Stable. Will monitor.       Resolved Problems:    Hyponatremia    Hypokalemia    Alcohol withdrawal (HCC)        Medications:  Reviewed  Infusion Medications    norepinephrine 20 mcg/min (10/24/21 1210)    dilTIAZem (CARDIZEM) 125 mg in dextrose 5% 125 mL infusion 10 mg/hr (10/24/21 1207)    propofol 20 mcg/kg/min (10/24/21 1028)    fentaNYL 50 mcg/hr (10/24/21 0615)    sodium chloride      IV infusion builder 100 mL/hr at 10/24/21 0615    sodium chloride      dexmedetomidine (PRECEDEX) IV infusion Stopped (10/18/21 1940)    dextrose      sodium chloride 100 mL/hr at 10/21/21 0553     Scheduled Medications    sodium bicarbonate  100 mEq IntraVENous Once    albuterol        albumin human  25 g IntraVENous Once    ipratropium-albuterol        lidocaine PF  5 mL IntraDERmal Once    sodium chloride flush  5-40 mL IntraVENous 2 times per day    ipratropium  4 puff Inhalation Q4H    And    albuterol sulfate HFA  4 puff Inhalation Q4H    insulin lispro  0-12 Units SubCUTAneous Q4H    piperacillin-tazobactam  3,375 mg IntraVENous Q12H    baricitinib  1 mg Oral Daily    vancomycin (VANCOCIN) intermittent dosing (placeholder)   Other See Admin Instructions    atorvastatin  80 mg Oral Nightly    aspirin  300 mg Rectal Daily    sodium chloride flush  5-40 mL IntraVENous 2 times per day    thiamine  100 mg Oral Daily    multivitamin  1 tablet Oral Daily    famotidine (PEPCID) injection  20 mg IntraVENous BID    insulin glargine  10 Units SubCUTAneous Nightly    zinc sulfate  50 mg Oral Daily    ascorbic acid  1,000 mg Oral Daily    sodium chloride flush  5-40 mL IntraVENous 2 times per day    enoxaparin  30 mg SubCUTAneous BID     PRN Meds: sodium chloride flush, sodium chloride, ondansetron **OR** ondansetron, polyethylene glycol, sodium chloride flush, sodium chloride, lubrifresh P.M., glucose, dextrose, glucagon (rDNA), dextrose, sodium chloride flush, sodium chloride, acetaminophen **OR** acetaminophen, guaiFENesin-dextromethorphan, [DISCONTINUED] potassium chloride **OR** [DISCONTINUED] potassium alternative oral replacement **OR** potassium chloride      DVT Prophylaxis: Subcut heparin  Diet: Diet NPO  Code Status: Limited     Remains critical with guarded prognosis  Pt has a high probability of imminent or life-threatening deterioration requiring close monitoring, and highly complex decision-making and/or interventions of high intensity to assess, manipulate, and support his critical organ systems to prevent a likely inevitable decline which could occur if left untreated.   35 minutes of critical care time spent.      ____________________________________________________________________________    Subjective:   Overnight Events:   Remains sedated on the vent. Attempt to wean off sedation monitor for response      Physical Exam:  /68   Pulse 144   Temp 99.9 °F (37.7 °C) (Rectal)   Resp 22   Ht 6' 3\" (1.905 m)   Wt 231 lb (104.8 kg)   SpO2 93%   BMI 28.87 kg/m²   General appearance: No apparent distress, appears stated age and cooperative. Sedated on the vent  HEENT: Normocephalic, atraumatic, MMM, No sclera icterus/conjuctival palor  Neck: Supple, no thyromegally. No jugular venous distention. Respiratory:  Normal respiratory effort. Clear to auscultation, no Rales/Wheezes/Rhonchi. On the vent  Cardiovascular: S1/S2 without murmurs, rubs or gallops. Tachycardia  Abdomen: Soft, non-tender, non-distended, bowel sounds present. Musculoskeletal: No clubbing, cyanosis or edema bilaterally. Skin: Skin color, texture, turgor normal.  No rashes or lesions. Neurologic: Under sedation      Intake/Output Summary (Last 24 hours) at 10/24/2021 1244  Last data filed at 10/24/2021 0907  Gross per 24 hour   Intake 4706.28 ml   Output --   Net 4706.28 ml       Labs:   Recent Labs     10/22/21  1123 10/23/21  0326 10/24/21  0440   WBC 36.9* 24.9* 18.6*   HGB 19.5* 17.6 13.9   HCT 62.6* 56.4* 44.7    206 147      Recent Labs     10/22/21  1240 10/22/21  1240 10/22/21  1950 10/23/21  0326 10/24/21  0440   *   < > 148* 147* 145   K 6.6*   < > 7.0* 4.9 6.1*   *   < > 110 104 104   CO2 19*   < > 19* 26 22   BUN 64*   < > 71* 44* 101*   CREATININE 3.4*   < > 3.2* 2.9* 5.7*   CALCIUM 8.6   < > 8.0* 8.0* 6.1*   PHOS 13.3*  --   --  8.1* 11.2*   AST 44*  --   --  40* 51*   *  --   --  85* 67*   BILIDIR 3.6*  --   --  3.6* 1.6*   BILITOT 3.7*  --   --  4.0* 1.9*   ALKPHOS 105  --   --  99 61    < > = values in this interval not displayed.      No results for input(s): Kristen Rincon in the last 72 hours. Urinalysis:    Lab Results   Component Value Date    NITRU NEGATIVE 10/22/2021    WBCUA 6 10/22/2021    BACTERIA FEW 10/22/2021    RBCUA 427 10/22/2021    BLOODU LARGE 10/22/2021    SPECGRAV 1.019 10/22/2021       Radiology:  XR CHEST PORTABLE   Final Result   1. Stable lines, tubes, and support devices. 2. Stable bilateral airspace opacities. 3. Improving pneumomediastinum. 4. Trace left apical pneumothorax. VL DUP CAROTID BILATERAL   Final Result   The right internal carotid artery demonstrates 0-50% stenosis. The visualized left internal carotid artery demonstrates 0-50% stenosis. Note that the distal cervical left internal carotid artery is not visualized   due to overlying support equipment. Bilateral vertebral arteries are patent with flow in the normal direction. IR NONTUNNELED VASCULAR CATHETER > 5 YEARS   Final Result   Successful ultrasound guided non-tunneled right common femoral vein   triple-lumen temporary dialysis catheter placement at the ICU bedside         IR CHEST TUBE INSERTION   Final Result   Addendum 1 of 1   ADDENDUM:   5 sonographic images were obtained of the left anterior hemithorax to    confirm   the presence of a left anterior pneumothorax. Final   Successful ultrasound guided placement of a left-sided 12 Persian locking   pigtail chest tube at the ICU bedside         XR CHEST PORTABLE   Final Result   1. Small bore left thoracostomy tube now present with near complete   resolution of the left pneumothorax. 2. Similar subcutaneous emphysema and pneumomediastinum. 3. Similar appearance of diffuse bilateral airspace opacities. XR ABDOMEN (KUB) (SINGLE AP VIEW)   Final Result   1. Dual-lumen dialysis catheter noted on the right with tip at the expected   location of the proximal right common iliac vein.          XR CHEST PORTABLE   Final Result   New moderate-sized left pneumothorax and stable trace right-sided pneumothorax. No mediastinal shift. Extensive pneumomediastinum and   subcutaneous emphysema persists. Stable diffuse bilateral pulmonary infiltrates. Satisfactory position of endotracheal tube      The findings were sent to the Radiology Results [address] at 10:51   am on 10/22/2021to be communicated to a licensed caregiver. XR CHEST PORTABLE   Final Result   Interval placement of the endotracheal tube with the tip about the clavicular   heads, satisfactory. Redemonstration of extensive pneumomediastinum and subcutaneous emphysema as   well as suggestion of the biapical pneumothorax. The amount of   pneumomediastinum and subcutaneous emphysema appears to have increased when   compared to the prior study. Redemonstration of bilateral patchy areas of airspace opacities reflecting   multifocal pneumonia. XR CHEST PORTABLE   Final Result   Redemonstration of imaging features suggestive of pneumomediastinum and   subcutaneous emphysema is of the soft tissues of the neck and chest wall,   which appear to have increased when compared to the prior study. Small   biapical pneumothorax redemonstrated. Redemonstration of bilateral patchy areas of airspace opacities, reflecting   multifocal pneumonia, which appear to have slightly improved when compared to   the prior study. XR CHEST PORTABLE   Preliminary Result   Worsening pulmonary opacities particularly in the right mid and lower lung   field. Pneumomediastinum. Small bilateral apical pneumothoraces. There has been a   slight decrease in the size of the pneumothoraces when compared to the prior   study. XR CHEST PORTABLE   Final Result   1. Development of pneumomediastinum and bilateral pneumothoraces since the   10/18/2021 exam.   2. Diffuse bilateral lung infiltrates, compatible with COVID pneumonia. CT HEAD WO CONTRAST   Final Result   1.  Left cerebral large middle cerebral artery

## 2021-10-24 NOTE — PROGRESS NOTES
pulmonary      SUBJECTIVE:  Intubated on vent     OBJECTIVE    VITALS:  BP (!) 101/52   Pulse 110   Temp 101.1 °F (38.4 °C)   Resp 24   Ht 6' 3\" (1.905 m)   Wt 231 lb (104.8 kg)   SpO2 96%   BMI 28.87 kg/m²   HEAD AND FACE EXAM:  No throat injection, no active exudate,no thrush  NECK EXAM;No JVD, no masses, symmetrical  CHEST EXAM; Expansion equal and symmetrical, no masses  LUNG EXAM; Good breath sounds bilaterally. There are expiratory wheezes both lungs, there are crackles at both lung bases  CARDIOVASCULAR EXAM: Positive S1 and S2, no S3 or S4, no clicks ,no murmurs  RIGHT AND LEFT LOWER EXTRIMITY EXAM: No edema, no swelling, no inflamation  . LABS   Lab Results   Component Value Date    WBC 18.6 (H) 10/24/2021    HGB 13.9 10/24/2021    HCT 44.7 10/24/2021    .4 (H) 10/24/2021     10/24/2021     Lab Results   Component Value Date    CREATININE 5.7 (H) 10/24/2021     (H) 10/24/2021     10/24/2021    K 6.1 (HH) 10/24/2021     10/24/2021    CO2 22 10/24/2021     No results found for: INR, PROTIME       Lab Results   Component Value Date    PHOS 11.2 10/24/2021    PHOS 8.1 10/23/2021    PHOS 13.3 10/22/2021        Recent Labs     10/22/21  0315 10/23/21  1000 10/24/21  0600   PH 7.14* 7.05* 7.08*   PO2ART 71* 76 83   HRN7ZVW 57.0* 91.0* 80.0*   O2SAT 88.7* 93.0* 94.6*         Wt Readings from Last 3 Encounters:   10/17/21 231 lb (104.8 kg)   10/10/21 260 lb (117.9 kg)   05/13/21 252 lb 12.8 oz (114.7 kg)               ASSESMENT  Ac rersp failure  covid pneumonia  Bact pneumonia  Left ptx        PLAN  1. cpm  2. Cont full vent support  3.  He is still acidotic with high pco2 but maximized on vent setting  4. cxr    10/24/2021  Nikki Guerrero MD, MNALLELY.

## 2021-10-24 NOTE — PROGRESS NOTES
Hemodialysis treatment    Patient ran for 3 hours on a 2 K bath. No UF. Net UF of -572 mL. 25g albumin given at start of treatment by HD RN per orders. 42.2 L blood processed. Upon treatment completion, blood returned to patient. Lines saline locked and capped. Dressing changed by staff RN today prior to HD treatment. Patient Name: Anahi Castillo  Patient : 1955  MRN: [id number]     Acct: [de-identified]  Date of Admission: 10/14/2021  Room/Bed: -A  Code Status:  Limited  Allergies: No Known Allergies  Diagnosis:    Patient Active Problem List   Diagnosis    Pneumonia due to COVID-19 virus    Acute respiratory failure with hypoxia ([address]    Hyperglycemia    Alcohol withdrawal ([address]    Acute ischemic left MCA stroke ([address]    Right flaccid hemiplegia (HCC)    Leukocytosis    Pneumomediastinum ([address]    Subcutaneous emphysema ([address]    Biapical Pneumothorax    Hypernatremia    Hyperkalemia    Atrial fibrillation with RVR [facility]    Transaminitis         Treatment:  Hemodialysis 1:1  Priority: Routine  Location: ICU    Diabetic: No  NPO: Yes  Isolation Precautions: Airborne     Consent for Treatment Verified: Yes  Blood Consent Verified: Not Applicable     Safety Verified: Identify (I), Consent (C), Equipment (E), HepB Status (B), Orders Complete (O), Access Verified (A) and Timeliness (T)  Time out performed prior to access at 1505 hours. Report Received from Primary RN at 1435 hours. Primary RN (First Initial, Last Name, Title): SHELLIE Mendez RN  Incapacitated Nurse Education Completed: Yes     HBsAg ONLY:  Date Drawn: 2021       Results: Negative  HBsAb:  Date Drawn:  2021       Results: Susceptible <10    Order  Dialysis Bath  K+ (Potassium): 2  Ca+ (Calcium): 2.5  Na+ (Sodium): 138  HCO3 (Bicarb): 40     Na+ Modeling: Not Applicable  Dialyzer: K351  Dialysate Temperature (C):  36  Blood Flow Rate (BFR):  250   Dialysate Flow Rate (DFR):   500        Access to be Utilized   Access: Non-tunneled Catheter  Location: Femoral  Side: Right   Needle gauge:  Not Applicable  + Bruit/Thrill: Not Applicable    First Use X-ray Verified: Yes  OK to use line order: Yes    Site Assessment:  Signs and Symptoms of Infection/Inflammation: None  If yes: Not Applicable  Dressing: Dry and Intact  Site Prep: Medical Aseptic Technique  Dressing Changed this Treatment: Yes  If yes, by whom: Bedside RN  Date of Last Dressing Change: October 24, 2021  Antimicrobial Patch in place?: Yes  Caps in place?: Yes  Gauze Dressing?: No  Non Dialysis Use?: No  Comment:    Flows: Good, Patent  If access problem, who was notified:     Pre and Post-Assessment  Patient Vitals for the past 8 hrs:   Level of Consciousness Heart Rhythm Respiratory Quality/Effort O2 Device Bilateral Breath Sounds Skin Color Skin Condition/Temp Abdomen Inspection Bowel Sounds (All Quadrants) Edema Edema Generalized RUE Edema LUE Edema Pain Level   10/24/21 1100 -- -- -- Ventilator -- -- -- -- -- -- -- -- -- --   10/24/21 1114 Responds to Pain (2) -- Accessory muscle use;Diaphragmatic -- -- Ecchymosis Cool Soft;Rounded -- Right upper extremity; Left upper extremity -- Non-pitting Non-pitting --   10/24/21 1445 Responds to Pain (2) -- Accessory muscle use Ventilator Diminished Appropriate for ethnicity Cool;Dry Soft;Rounded Hypoactive Generalized Non-pitting -- -- 0   10/24/21 1650 Unresponsive (3) -- Accessory muscle use -- -- Appropriate for ethnicity Cool;Dry Soft;Rounded -- Left upper extremity;Right lower extremity -- Non-pitting Non-pitting --   10/24/21 1816 Unresponsive (3) Irregular Accessory muscle use Ventilator Diminished Appropriate for ethnicity Cool;Dry Soft;Rounded Hypoactive Generalized Non-pitting -- -- 0     Labs  Recent Labs     10/22/21  1123 10/23/21  0326 10/24/21  0440   WBC 36.9* 24.9* 18.6*   HGB 19.5* 17.6 13.9   HCT 62.6* 56.4* 44.7    206 147 Recent Labs     10/22/21  1950 10/23/21  0326 10/24/21  0440   * 147* 145   K 7.0* 4.9 6.1*    104 104   CO2 19* 26 22   BUN 71* 44* 101*   CREATININE 3.2* 2.9* 5.7*   GLUCOSE 443* 174* 364*     IV Drips and Rate/Dose   norepinephrine 14 mcg/min (10/24/21 1656)    dilTIAZem (CARDIZEM) 125 mg in dextrose 5% 125 mL infusion 15 mg/hr (10/24/21 1321)    propofol 20 mcg/kg/min (10/24/21 1755)    fentaNYL 50 mcg/hr (10/24/21 1503)    sodium chloride      IV infusion builder 100 mL/hr at 10/24/21 1654    sodium chloride      dexmedetomidine (PRECEDEX) IV infusion Stopped (10/18/21 1940)    dextrose      sodium chloride 100 mL/hr at 10/21/21 0553      Safety - Before each treatment:   Dialysis Machine No.: [id number]  RO Machine No.: [id number]  Dialyzer Lot No.: [id number]  RO Machine Log Sheet Completed: Yes  Machine Alarm Self Test: Completed; Passed (10/24/21 1445)  Machine Autotest: Completed, Passed  Air Foam Detector: Tested, pH Reading, Proper Function  Extracorporeal Circuit Tested for Integrity: Yes  Machine Conductivity: 13.5  Manual Conductivity: 13.2     Bicarbonate Concentrate Lot No.: [id number]  Acid Concentrate Lot No.: [id number]  Manual Ph: 7.6  Bleach Test (Neg): Yes  Bath Temperature: 96.8 °F (36 °C)  Tubing Lot#: [id number]  Conductivity Meter Serial #: [id number]  All Connections Secure?: Yes  Venous Parameters Set?: Yes  Arterial Parameters Set?: Yes  Saline Line Double Clamped?: Yes  Air Foam Detector Engaged?: Yes  Machine Functioning Alarm Free?  Yes  Prime Given: 200ml    Chlorine Testing - Before each treatment and every 4 hours:   Treatment  Treatment Number: 2  Time On: 5763  Time Off: 5395  Treatment Goal: 2hrs  Weight: 231 lb (104.8 kg) (10/17/21 0631)  1st check: less than 0.1 ppm at: 1455 hours  2nd check: less than 0.1 ppm at: n/a  3rd check: Not Applicable  (if greater than 0.1 ppm, then check every 30 minutes from secondary)    Access Flows and Pressures  Patient Vitals for the past 8 hrs:   Blood Flow Rate (ml/min) Ultrafiltration Rate (ml/hr) Ultrafiltration Total Arterial Pressure (mmHg) Venous Pressure (mmHg) TMP Hemodialysis Conductivity DFR Comments Access Visible   10/24/21 1509 200 ml/min 10 ml/hr 0 ml -60 mmHg 20 30 13.7 500 tx started Yes   10/24/21 1515 250 ml/min 10 ml/hr 0 ml -100 mmHg 100 30 13.7 500 bfr to goal Yes   10/24/21 1518 -- -- -- -- -- -- -- -- albumin started Yes   10/24/21 1530 250 ml/min 10 ml/hr 3 ml -100 mmHg 100 30 -- 500 family at bedside with patient Yes   10/24/21 1545 250 ml/min 10 ml/hr 4 ml -100 mmHg 110 30 13.7 500 primary rn and respiratory at bedside Yes   10/24/21 1600 250 ml/min 10 ml/hr 7 ml -100 mmHg 100 30 13.4 500 respiratory at bedside. called nephrologist Yes   10/24/21 1615 -- -- -- -- -- -- -- -- family no longer at bedside Yes   10/24/21 1630 250 ml/min 10 ml/hr 12 ml -110 mmHg 110 30 13.5 500 acid jug changed Yes   10/24/21 1645 250 ml/min 10 ml/hr 14 ml -100 mmHg 110 30 13.5 500 lines secured.  no acute changes Yes   10/24/21 1700 250 ml/min 10 ml/hr 17 ml -110 mmHg 110 30 13.5 500 primary rn giving meds Yes   10/24/21 1715 250 ml/min 10 ml/hr 19 ml -100 mmHg 100 30 -- 500 bicarb jug changed Yes   10/24/21 1730 250 ml/min 10 ml/hr 21 ml -100 mmHg 110 30 -- 500 no changes Yes   10/24/21 1745 250 ml/min 10 ml/hr 24 ml -100 mmHg 110 30 13.7 500 no changes Yes   10/24/21 1800 250 ml/min 10 ml/hr 27 ml -100 mmHg 110 30 13.5 500 primary rn at bedside Yes   10/24/21 1812 200 ml/min -- 28 ml -- -- -- -- 500 tx completed Yes   10/24/21 1816 -- -- -- -- -- -- -- -- post tx assessment --     Vital Signs  Patient Vitals for the past 8 hrs:   BP Temp Pulse Resp SpO2   10/24/21 1030 (!) 92/49 101.6 °F (38.7 °C) 149 24 93 %   10/24/21 1100 (!) 83/53 101.5 °F (38.6 °C) 156 24 94 %   10/24/21 1115 (!) 83/52 -- 119 23 92 %   10/24/21 1130 130/86 -- 135 23 95 %   10/24/21 1145 131/74 -- 136 23 95 %   10/24/21 1151 -- -- 138 20 94 %   10/24/21 1200 136/73 -- 138 23 94 %   10/24/21 1212 124/67 99.9 °F (37.7 °C) 140 24 94 %   10/24/21 1215 115/68 -- 144 22 93 %   10/24/21 1230 111/75 -- 143 22 93 %   10/24/21 1245 121/66 -- 133 21 93 %   10/24/21 1300 122/70 -- 121 20 93 %   10/24/21 1315 119/76 98.4 °F (36.9 °C) 117 23 --   10/24/21 1330 123/76 -- 125 19 --   10/24/21 1345 127/61 -- 104 24 --   10/24/21 1400 134/83 -- 110 17 --   10/24/21 1415 (!) 154/76 -- 118 (!) 32 --   10/24/21 1430 132/76 -- 116 18 95 %   10/24/21 1445 133/60 -- 105 21 --   10/24/21 1500 133/68 -- 104 20 --   10/24/21 1509 133/68 95.2 °F (35.1 °C) 102 29 --   10/24/21 1515 127/70 95.1 °F (35.1 °C) 110 23 --   10/24/21 1530 127/68 95.1 °F (35.1 °C) 94 17 91 %   10/24/21 1545 (!) 88/56 95.2 °F (35.1 °C) 113 21 (!) 88 %   10/24/21 1548 104/60 -- 111 18 (!) 80 %   10/24/21 1600 102/70 95.3 °F (35.2 °C) 108 24 (!) 84 %   10/24/21 1603 -- -- -- -- (!) 87 %   10/24/21 1615 113/74 95.5 °F (35.3 °C) 96 23 (!) 86 %   10/24/21 1630 126/77 95.8 °F (35.4 °C) 96 21 (!) 87 %   10/24/21 1645 136/76 96.2 °F (35.7 °C) 101 23 (!) 85 %   10/24/21 1700 133/76 96.5 °F (35.8 °C) 100 23 (!) 86 %   10/24/21 1715 138/83 96.8 °F (36 °C) 92 21 --   10/24/21 1725 -- -- 103 -- (!) 85 %   10/24/21 1730 139/73 97 °F (36.1 °C) 98 23 (!) 84 %   10/24/21 1745 131/78 97.1 °F (36.2 °C) 101 20 (!) 84 %   10/24/21 1800 (!) 150/91 97.2 °F (36.2 °C) 108 23 (!) 85 %   10/24/21 1802 -- -- 104 20 (!) 84 %   10/24/21 1816 (!) 141/77 97.2 °F (36.2 °C) 96 20 (!) 89 %     Post-Dialysis  Arterial Catheter Locking Solution: saline locked  Venous Catheter Locking Solution: saline locked  Post-Treatment Procedures: Blood returned, Catheter Capped, clamped with Saline x2 ports  Machine Disinfection Process: Exterior Machine Disinfection  Rinseback Volume (ml): 300 ml  Total Liters Processed (l/min): 42.2 l/min  Dialyzer Clearance: Lightly streaked  Duration of Treatment (minutes): 183 minutes  Heparin amount administered during treatment (units): 0 units  Hemodialysis Intake (ml): 600 ml  Hemodialysis Output (ml): 28 ml  NET Removed (ml): -572 ml  Tolerated Treatment: Good  Patient Response to Treatment: lab drawn post treatment  Physician Notified?: Yes       Provider Notification  Provider Notification  Reason for Communication: Evaluate (BP and SpO2 drop) (10/24/21 1600)  Provider Name: Dr Rosaline Beck (10/24/21 1600)  Provider Notification: Physician (10/24/21 1600)  Method of Communication: Call (10/24/21 1600)  Response: No new orders (10/24/21 1600)  Notification Time: [phone] (10/24/21 1600)     Handoff complete and report given to Primary RN at 1830 hours. Primary RN (First Initial, Last Name, Title):  SHELLIE Olmos RN     Education  Person Educated: Patient   Knowledge Base: unknown  Barriers to Learning?: Yes, including patient non-responsive  Preferred method of Learning: Oral  Topic(s): Access Care, Albumin, Procedural and Potassium   Teaching Tools: Explanation   Response to Education: Requires Follow-up     Electronically signed by Susan Ovalles RN on 10/24/2021 at 6:21 PM

## 2021-10-24 NOTE — PROGRESS NOTES
Aware of K and acidosis, on bicarb gtt,. Medical therapy high K now and dialysis nurse called in for acute hemodialysis and fu bmp after HD 3 hr. If still acidotic then may need consider CRRT.  Will fu

## 2021-10-24 NOTE — PROGRESS NOTES
10/24/21 0412   Vent Information   Vent Type 980   Vent Mode AC/VC   Vt Ordered 500 mL   Rate Set 20 bmp   Peak Flow 55 L/min   Pressure Support 0 cmH20   FiO2  90 %   SpO2 94 %   SpO2/FiO2 ratio 104.44   Sensitivity 3   PEEP/CPAP 12   I Time/ I Time % 0 s   Vent Patient Data   High Peep/I Pressure 0   Peak Inspiratory Pressure 24 cmH2O   Mean Airway Pressure 17 cmH20   Rate Measured 25 br/min   Vt Exhaled 524 mL   Minute Volume 13 Liters   I:E Ratio 1:1.50   Spontaneous Breathing Trial (SBT) RT Doc   Pulse 104   Additional Respiratory  Assessments   Resp 25   Alarm Settings   High Pressure Alarm 40 cmH2O   ETT (adult)   Placement Date/Time: 10/22/21 0220   Timeout: Patient;Procedure; Appropriate Equipment;Correct Position  Preoxygenation: Yes  Mask Ventilation: Ventilated by mask (1)  Technique: Direct laryngoscopy;Rapid sequence;Stylet  Type: Cuffed  Tube Size: 7.5 m. ..    Secured at 23 cm   Measured From [address] [place] [address],[address] By Commercial tube strong   Site Condition Dry   Cuff Pressure   (mlt)

## 2021-10-25 NOTE — PROGRESS NOTES
Hospitalist Progress Note      PCP: No primary care provider on file. Date of Admission: 10/14/2021    LOS: 6    Chief Complaint:   Chief Complaint   Patient presents with    Shortness of Breath     dx with covid 4 days ago        Case Summary:   80-year-old gentleman with history of alcohol abuse, hypertension, hyperlipidemia who presented with worsening cough, shortness of breath, fevers with chills, vomiting, diarrhea, poor appetite, generalized malaise and weakness found to have COVID-19 pneumonia and acute respiratory failure with hypoxia complicated by lack left MCA stroke, electrolyte abnormalities and alcohol withdrawals. He decompensated the night of 10/21/2021 with hypoxia and sats of 84% on BiPAP and was found to have pneumomediastinum with extensive subcutaneous emphysema and small biapical pneumothoraces.    He was intubated 10/21/2012  Left Chest pigtail placement 10/22/2021 for pneumothorax        Active Hospital Problems    Diagnosis Date Noted    Atrial fibrillation with RVR ([address] [I48.91] 10/24/2021    Transaminitis [R74.01] 10/24/2021    Hypernatremia [E87.0] 10/23/2021    Hyperkalemia [E87.5] 10/23/2021    Pneumomediastinum ([address] [J98.2] 10/22/2021    Subcutaneous emphysema ([address] [T79. 7XXA] 10/22/2021    Biapical Pneumothorax [J93.9] 10/22/2021    Acute ischemic left MCA stroke ([address] [I63.512] 10/21/2021    Right flaccid hemiplegia (HCC) [G81.01] 10/21/2021    Leukocytosis [D72.829] 10/21/2021    Alcohol withdrawal ([address] [F10.239] 10/19/2021    Acute respiratory failure with hypoxia (HCC) [J96.01] 10/16/2021    Hyperglycemia [R73.9] 10/16/2021    Pneumonia due to COVID-19 virus [U07.1, J12.82] 10/14/2021         Principal Problem:    Pneumonia due to COVID-19 virus and Acute respiratory failure with hypoxia: He was difficult to oxygenate post intubation requiring proning position for overnight on 10/22/2021. Remains supine.   Remains on the vent.  -Continue dexamethasone  -Continue baricitinib renally dosed  -Continue empiric antibiotics  -Continue breathing therapy with cardiopulmonary protocol  -Pulmonology following with recommendations      Acute kidney injury with metabolic acidosis: Continues with persistent renal dysfunction despite dialysis. -Nephrology following plans for tunnel catheter placement CRRT  -Monitor lung function and electrolytes      Acute ischemic left MCA stroke and Right flaccid hemiplegia: CT brain noted left MCA stroke. Carotid Dopplers with no stenosis. -Continue aspirin and statin  -Await echocardiogram  -Neurology following remotely      Atrial fibrillation with RVR: Was started on Cardizem drip. Rate improved to the low 100s. -Await echocardiogram  -Continue rate control  -Cardiology following recommendation      Biapical Pneumothorax with Pneumomediastinum and Subcutaneous emphysema: Status post left thoracotomy tube placement with pigtail. Improved left pneumothorax on repeat chest x-ray with resolution of breath pneumothorax. Remains with extensive subcutaneous emphysema.  -We will continue to monitor  -Pulmonology following    Active Problems:    Hyperglycemia: Sliding scale insulin    Leukocytosis: In the setting of Covid pneumonia and possible superimposed bacterial pneumonia. On empiric antibiotics    Hyperkalemia: Received Lokelma and dialysis with resolution. Transaminitis: Mildly elevated. Will monitor      Resolved Problems:    Hyponatremia    Hypokalemia    Alcohol withdrawal (HCC)    Hypernatremia      Pt has a high probability of imminent or life-threatening deterioration requiring close monitoring, and highly complex decision-making and/or interventions of high intensity to assess, manipulate, and support his critical organ systems to prevent a likely inevitable decline which could occur if left untreated. 37 minutes of critical care time spent.     Prognosis guarded      Medications:  Reviewed  Infusion Medications    prismaSATE BGK 4/2.5      prismaSATE BGK 4/2.5      prismaSATE BGK 4/2.5      norepinephrine 4 mcg/min (10/25/21 1148)    dilTIAZem (CARDIZEM) 125 mg in dextrose 5% 125 mL infusion Stopped (10/24/21 2320)    propofol 15 mcg/kg/min (10/25/21 0920)    fentaNYL 50 mcg/hr (10/25/21 1104)    sodium chloride      IV infusion builder 100 mL/hr at 10/25/21 0615    sodium chloride      dexmedetomidine (PRECEDEX) IV infusion Stopped (10/18/21 1940)    dextrose      sodium chloride 100 mL/hr at 10/21/21 0553     Scheduled Medications    sodium bicarbonate  100 mEq IntraVENous Once    lidocaine PF  5 mL IntraDERmal Once    sodium chloride flush  5-40 mL IntraVENous 2 times per day    ipratropium  4 puff Inhalation Q4H    And    albuterol sulfate HFA  4 puff Inhalation Q4H    insulin lispro  0-12 Units SubCUTAneous Q4H    piperacillin-tazobactam  3,375 mg IntraVENous Q12H    baricitinib  1 mg Oral Daily    vancomycin (VANCOCIN) intermittent dosing (placeholder)   Other See Admin Instructions    atorvastatin  80 mg Oral Nightly    aspirin  300 mg Rectal Daily    sodium chloride flush  5-40 mL IntraVENous 2 times per day    thiamine  100 mg Oral Daily    multivitamin  1 tablet Oral Daily    famotidine (PEPCID) injection  20 mg IntraVENous BID    insulin glargine  10 Units SubCUTAneous Nightly    zinc sulfate  50 mg Oral Daily    ascorbic acid  1,000 mg Oral Daily    sodium chloride flush  5-40 mL IntraVENous 2 times per day    enoxaparin  30 mg SubCUTAneous BID     PRN Meds: potassium chloride, magnesium sulfate, calcium gluconate **OR** calcium gluconate **OR** calcium gluconate **OR** calcium gluconate, sodium phosphate IVPB **OR** sodium phosphate IVPB **OR** sodium phosphate IVPB **OR** sodium phosphate IVPB, sodium chloride flush, sodium chloride, ondansetron **OR** ondansetron, polyethylene glycol, sodium chloride flush, sodium chloride, lubrifresh P.M., glucose, dextrose, glucagon (rDNA), dextrose, sodium chloride flush, sodium chloride, acetaminophen **OR** acetaminophen, guaiFENesin-dextromethorphan, [DISCONTINUED] potassium chloride **OR** [DISCONTINUED] potassium alternative oral replacement **OR** potassium chloride      DVT Prophylaxis: Enoxaparin  Diet: Diet NPO  Code Status: Limited      ____________________________________________________________________________    Subjective:   Overnight Events:   Remains critical  On the vent with low-grade temperature      Physical Exam:  BP 81/63   Pulse 106   Temp 100.2 °F (37.9 °C) (Rectal)   Resp 22   Ht 6' 3\" (1.905 m)   Wt 231 lb (104.8 kg)   SpO2 95%   BMI 28.87 kg/m²   General appearance: No apparent distress, appears stated age and cooperative. HEENT: Normocephalic, atraumatic, MMM, No sclera icterus/conjuctival palor  Neck: Supple, no thyromegally. No jugular venous distention. Respiratory:  Normal respiratory effort. Clear to auscultation, no Rales/Wheezes/Rhonchi. Cardiovascular: S1/S2 without murmurs, rubs or gallops. RRR  Abdomen: Soft, non-tender, non-distended, bowel sounds present. Musculoskeletal: No clubbing, cyanosis or edema bilaterally. Skin: Skin color, texture, turgor normal.  No rashes or lesions.   Neurologic:  Cranial nerves: II-XII intact, DELORES, No focal sensory/motor deficits  Psychiatric: Alert and oriented, thought content appropriate  Capillary Refill: Brisk,< 3 seconds   Peripheral Pulses: +2 palpable, equal bilaterally       Intake/Output Summary (Last 24 hours) at 10/25/2021 1206  Last data filed at 10/25/2021 0729  Gross per 24 hour   Intake 4014 ml   Output 103 ml   Net 3911 ml       Labs:   Recent Labs     10/23/21  0326 10/24/21  0440 10/25/21  0545   WBC 24.9* 18.6* 20.9*   HGB 17.6 13.9 10.6*   HCT 56.4* 44.7 32.0*    147 106*      Recent Labs     10/23/21  0326 10/23/21  0326 10/24/21  0440 10/24/21  1810 10/25/21  0545   *   < > 145 141 144   K 4.9   < > 6.1* 3.5 4.7    < > 104 95* 98*   CO2 26   < > 22 29 25   BUN 44*   < > 101* 56* 115*   CREATININE 2.9*   < > 5.7* 2.9* 5.4*   CALCIUM 8.0*   < > 6.1* 7.3* 6.3*   PHOS 8.1*  --  11.2*  --  8.2*   AST 40*  --  51*  --  43*   ALT 85*  --  67*  --  53*   BILIDIR 3.6*  --  1.6*  --  0.5*   BILITOT 4.0*  --  1.9*  --  0.8   ALKPHOS 99  --  61  --  50    < > = values in this interval not displayed. No results for input(s): Miesha Lord in the last 72 hours. Urinalysis:    Lab Results   Component Value Date    NITRU NEGATIVE 10/22/2021    WBCUA 6 10/22/2021    BACTERIA FEW 10/22/2021    RBCUA 427 10/22/2021    BLOODU LARGE 10/22/2021    SPECGRAV 1.019 10/22/2021       Radiology:  IR NONTUNNELED VASCULAR CATHETER > 5 YEARS   Final Result   Successful ultrasound guided non-tunneled right common femoral vein   triple-lumen temporary dialysis catheter placement at the ICU bedside         IR CHEST TUBE INSERTION   Final Result   Addendum 1 of 1   ADDENDUM:   5 sonographic images were obtained of the left anterior hemithorax to    confirm   the presence of a left anterior pneumothorax. Final   Successful ultrasound guided placement of a left-sided 12 Vietnamese locking   pigtail chest tube at the ICU bedside         XR CHEST PORTABLE   Preliminary Result   1. Stable lines, tubes, and support devices. 2. Right pneumothorax is not seen on the current exam.   3. Diffuse airspace opacities, stable. 4. Severe subcutaneous emphysema. XR CHEST PORTABLE   Final Result   1. Stable lines, tubes, and support devices. 2. Stable cardiopulmonary status including pneumomediastinum, small right   pneumothorax, and bilateral airspace opacities. 3. Extensive subcutaneous gas limiting evaluation of the lung parenchyma. XR CHEST PORTABLE   Final Result   Unchanged exam with persistent tiny biapical pneumothoraces, subcutaneous air   and pneumomediastinum with diffuse interstitial infiltrates.          XR CHEST PORTABLE   Final Result   1. Stable lines, tubes, and support devices. 2. Stable bilateral airspace opacities. 3. Improving pneumomediastinum. 4. Trace left apical pneumothorax. VL DUP CAROTID BILATERAL   Final Result   The right internal carotid artery demonstrates 0-50% stenosis. The visualized left internal carotid artery demonstrates 0-50% stenosis. Note that the distal cervical left internal carotid artery is not visualized   due to overlying support equipment. Bilateral vertebral arteries are patent with flow in the normal direction. XR CHEST PORTABLE   Final Result   1. Small bore left thoracostomy tube now present with near complete   resolution of the left pneumothorax. 2. Similar subcutaneous emphysema and pneumomediastinum. 3. Similar appearance of diffuse bilateral airspace opacities. XR ABDOMEN (KUB) (SINGLE AP VIEW)   Final Result   1. Dual-lumen dialysis catheter noted on the right with tip at the expected   location of the proximal right common iliac vein. XR CHEST PORTABLE   Final Result   New moderate-sized left pneumothorax and stable trace right-sided   pneumothorax. No mediastinal shift. Extensive pneumomediastinum and   subcutaneous emphysema persists. Stable diffuse bilateral pulmonary infiltrates. Satisfactory position of endotracheal tube      The findings were sent to the Radiology Results [address] at 10:51   am on 10/22/2021to be communicated to a licensed caregiver. XR CHEST PORTABLE   Final Result   Interval placement of the endotracheal tube with the tip about the clavicular   heads, satisfactory. Redemonstration of extensive pneumomediastinum and subcutaneous emphysema as   well as suggestion of the biapical pneumothorax. The amount of   pneumomediastinum and subcutaneous emphysema appears to have increased when   compared to the prior study.       Redemonstration of bilateral patchy areas of airspace opacities reflecting   multifocal pneumonia. XR CHEST PORTABLE   Final Result   Redemonstration of imaging features suggestive of pneumomediastinum and   subcutaneous emphysema is of the soft tissues of the neck and chest wall,   which appear to have increased when compared to the prior study. Small   biapical pneumothorax redemonstrated. Redemonstration of bilateral patchy areas of airspace opacities, reflecting   multifocal pneumonia, which appear to have slightly improved when compared to   the prior study. XR CHEST PORTABLE   Preliminary Result   Worsening pulmonary opacities particularly in the right mid and lower lung   field. Pneumomediastinum. Small bilateral apical pneumothoraces. There has been a   slight decrease in the size of the pneumothoraces when compared to the prior   study. XR CHEST PORTABLE   Final Result   1. Development of pneumomediastinum and bilateral pneumothoraces since the   10/18/2021 exam.   2. Diffuse bilateral lung infiltrates, compatible with COVID pneumonia. CT HEAD WO CONTRAST   Final Result   1. Left cerebral large middle cerebral artery (MCA) vascular territory   subacute versus acute ischemia. 2. Recommend MRI brain with diffusion-weighted imaging for further evaluation. Critical results were called by Dr. Lilian Waddell to CLAUDIA Rosado on   10/20/2021 at 23:55. XR CHEST PORTABLE   Final Result   Multifocal infiltrates throughout the lungs bilaterally, mildly improved. XR CHEST PORTABLE   Final Result   Bilateral airspace opacities are seen with slight interval worsening. CTA head neck with contrast    (Results Pending)   XR CHEST PORTABLE    (Results Pending)           Samira Coles MD      Please excuse brevity and/or typos. This report was transcribed using voice recognition software.  Every effort was made to ensure accuracy, however, inadvertent computerized transcription errors may be present.

## 2021-10-25 NOTE — FLOWSHEET NOTE
Patient's wife attempted to visit patient, was stopped by nursing assistant. Policy of no visitation for actively infectious COVID patients reiterated to wife, as it was to daughter yesterday.

## 2021-10-25 NOTE — PROGRESS NOTES
10/25/21 0746   Oxygen Therapy/Pulse Ox   O2 Therapy Oxygen   $Oxygen $Daily Charge   O2 Device Ventilator   FiO2  100 %   Resp 25   SpO2 90 %   Pulse Oximeter Device Mode Continuous   Pulse Oximeter Device Location Finger   $Pulse Oximeter $Spot check (multiple/continuous)

## 2021-10-25 NOTE — CONSULTS
Department of Cardiovascular & Thoracic Surgery   Consult Note    Reason for Consult:  Pneumomediastinum     Requesting Physician: Dr. Garon Closs    Date of Consult: 10/25/21      History Obtained From:  patient     HISTORY OF PRESENT ILLNESS:    The patient is a 77 y.o. male who presents with shortness of breath and hypoxia secondary to Covid-19. He was on BiPAP for several days and subsequently intubated on 10/22. He has high O2 and PEEP requirements. They are starting CRRT today. Left sided chest tube was placed on 10/22 by IR for pneumothorax. Pt is intubated and sedated, no family at bedside to provide history.      Past Medical History:        Diagnosis Date    Hyperlipidemia     Hypertension      Past Surgical History:        Procedure Laterality Date    APPENDECTOMY      COLONOSCOPY  03/25/2013    polyp,s x 5    EYE SURGERY Left     HERNIA REPAIR      IR CHEST TUBE INSERTION  10/22/2021    IR CHEST TUBE INSERTION [facility] SPECIAL PROCEDURES    IR NONTUNNELED VASCULAR CATHETER  10/22/2021    IR NONTUNNELED VASCULAR CATHETER [facility] SPECIAL PROCEDURES     Current Medications:   Current Facility-Administered Medications: prismaSATE BGK 4/2.5 dialysis solution, , Dialysis, Continuous  prismaSATE BGK 4/2.5 dialysis solution, , Dialysis, Continuous  prismaSATE BGK 4/2.5 dialysis solution, , Dialysis, Continuous  potassium chloride 20 mEq/50 mL IVPB (Central Line), 20 mEq, IntraVENous, PRN  magnesium sulfate 1000 mg in dextrose 5% 100 mL IVPB, 1,000 mg, IntraVENous, PRN  calcium gluconate 1000 mg in sodium chloride 50 mL, 1,000 mg, IntraVENous, PRN **OR** calcium gluconate 2000 mg in sodium chloride 100 mL, 2,000 mg, IntraVENous, PRN **OR** calcium gluconate 3,000 mg in dextrose 5 % 100 mL IVPB, 3,000 mg, IntraVENous, PRN **OR** calcium gluconate 4,000 mg in dextrose 5 % 100 mL IVPB, 4,000 mg, IntraVENous, PRN  sodium phosphate 6 mmol in dextrose 5 % 250 mL IVPB, 6 mmol, IntraVENous, PRN **OR** sodium phosphate 12 mmol in dextrose 5 % 250 mL IVPB, 12 mmol, IntraVENous, PRN **OR** sodium phosphate 18 mmol in dextrose 5 % 500 mL IVPB, 18 mmol, IntraVENous, PRN **OR** sodium phosphate 24 mmol in dextrose 5 % 500 mL IVPB, 24 mmol, IntraVENous, PRN  norepinephrine (LEVOPHED) 16 mg in dextrose 5% 250 mL infusion, 2-100 mcg/min, IntraVENous, Continuous  sodium bicarbonate 8.4 % injection 100 mEq, 100 mEq, IntraVENous, Once  dilTIAZem 125 mg in dextrose 5 % 125 mL infusion, 5-15 mg/hr, IntraVENous, Continuous  propofol injection, 5-50 mcg/kg/min, IntraVENous, Titrated  fentaNYL (SUBLIMAZE) 1,000 mcg in sodium chloride 0.9% 100 mL infusion, 12.5-200 mcg/hr, IntraVENous, Continuous  lidocaine PF 1 % injection 5 mL, 5 mL, IntraDERmal, Once  sodium chloride flush 0.9 % injection 5-40 mL, 5-40 mL, IntraVENous, 2 times per day  sodium chloride flush 0.9 % injection 5-40 mL, 5-40 mL, IntraVENous, PRN  0.9 % sodium chloride infusion, 25 mL, IntraVENous, PRN  albuterol sulfate  (90 Base) MCG/ACT inhaler 4 puff, 4 puff, Inhalation, Q4H **AND** ipratropium (ATROVENT HFA) 17 MCG/ACT inhaler 4 puff, 4 puff, Inhalation, Q4H  insulin lispro (HUMALOG) injection vial 0-12 Units, 0-12 Units, SubCUTAneous, Q4H  piperacillin-tazobactam (ZOSYN) 3,375 mg in dextrose 5 % 50 mL IVPB extended infusion (mini-bag), 3,375 mg, IntraVENous, Q12H  sodium bicarbonate 75 mEq in sodium chloride 0.45 % 1,000 mL infusion, , IntraVENous, Continuous  baricitinib (OLUMIANT) tablet 1 mg, 1 mg, Oral, Daily  vancomycin (VANCOCIN) intermittent dosing (placeholder), , Other, See Admin Instructions  atorvastatin (LIPITOR) tablet 80 mg, 80 mg, Oral, Nightly  ondansetron (ZOFRAN-ODT) disintegrating tablet 4 mg, 4 mg, Oral, Q8H PRN **OR** ondansetron (ZOFRAN) injection 4 mg, 4 mg, IntraVENous, Q6H PRN  polyethylene glycol (GLYCOLAX) packet 17 g, 17 g, Oral, Daily PRN  aspirin suppository 300 mg, 300 mg, Rectal, Daily  sodium chloride flush 0.9 % injection 5-40 mL, 5-40 mL, IntraVENous, 2 times per day  sodium chloride flush 0.9 % injection 5-40 mL, 5-40 mL, IntraVENous, PRN  0.9 % sodium chloride infusion, 25 mL, IntraVENous, PRN  thiamine tablet 100 mg, 100 mg, Oral, Daily  multivitamin 1 tablet, 1 tablet, Oral, Daily  famotidine (PEPCID) injection 20 mg, 20 mg, IntraVENous, BID  dexmedetomidine (PRECEDEX) 400 mcg in sodium chloride 0.9 % 100 mL infusion, 0.2-1.4 mcg/kg/hr, IntraVENous, Continuous  lubrifresh P.M. (artificial tears) ophthalmic ointment, , Both Eyes, Q2H PRN  insulin glargine (LANTUS) injection vial 10 Units, 10 Units, SubCUTAneous, Nightly  zinc sulfate (ZINCATE) capsule 50 mg, 50 mg, Oral, Daily  ascorbic acid (VITAMIN C) tablet 1,000 mg, 1,000 mg, Oral, Daily  glucose (GLUTOSE) 40 % oral gel 15 g, 15 g, Oral, PRN  dextrose 50 % IV solution, 12.5 g, IntraVENous, PRN  glucagon (rDNA) injection 1 mg, 1 mg, IntraMUSCular, PRN  dextrose 5 % solution, 100 mL/hr, IntraVENous, PRN  sodium chloride flush 0.9 % injection 5-40 mL, 5-40 mL, IntraVENous, 2 times per day  sodium chloride flush 0.9 % injection 5-40 mL, 5-40 mL, IntraVENous, PRN  0.9 % sodium chloride infusion, 25 mL, IntraVENous, PRN  enoxaparin (LOVENOX) injection 30 mg, 30 mg, SubCUTAneous, BID  acetaminophen (TYLENOL) tablet 650 mg, 650 mg, Oral, Q6H PRN **OR** acetaminophen (TYLENOL) suppository 650 mg, 650 mg, Rectal, Q6H PRN  guaiFENesin-dextromethorphan (ROBITUSSIN DM) 100-10 MG/5ML syrup 5 mL, 5 mL, Oral, Q4H PRN  [DISCONTINUED] potassium chloride (KLOR-CON M) extended release tablet 40 mEq, 40 mEq, Oral, PRN **OR** [DISCONTINUED] potassium bicarb-citric acid (EFFER-K) effervescent tablet 40 mEq, 40 mEq, Oral, PRN **OR** potassium chloride 10 mEq/100 mL IVPB (Peripheral Line), 10 mEq, IntraVENous, PRN  Allergies:  No Known Allergies    Social History:   Social History     Socioeconomic History    Marital status:      Spouse name: Not on file    Number of children: Not on file    Years of education: Not on file    Highest education level: Not on file   Occupational History    Not on file   Tobacco Use    Smoking status: Former Smoker     Packs/day: 1.00     Years: 30.00     Pack years: 30.00     Start date: 1991    Smokeless tobacco: Never Used   Substance and Sexual Activity    Alcohol use: Yes     Comment: occasional drinker    Drug use: No    Sexual activity: Not on file   Other Topics Concern    Not on file   Social History Narrative    Not on file     Social Determinants of Health     Financial Resource Strain:     Difficulty of Paying Living Expenses:    Food Insecurity:     Worried About [address] in the Last Year:     [address] in the Last Year:    Transportation Needs:     Lack of Transportation (Medical):  Lack of Transportation (Non-Medical):    Physical Activity:     Days of Exercise per Week:     Minutes of Exercise per Session:    Stress:     Feeling of Stress :    Social Connections:     Frequency of Communication with Friends and Family:     Frequency of Social Gatherings with Friends and Family:     Attends Mosque Services:     Active Member of Clubs or Organizations:     Attends Club or Organization Meetings:     Marital Status:    Intimate Partner Violence:     Fear of Current or Ex-Partner:     Emotionally Abused:     Physically Abused:     Sexually Abused:        Family History:    History reviewed. No pertinent family history. REVIEW OF SYSTEMS:  Unable to obtain due to patient condition. EXAM:  Constitutional: Blood pressure 96/74, pulse 92, temperature 99.2 °F (37.3 °C), temperature source Rectal, resp. rate 22, height 6' 3\" (1.905 m), weight 231 lb (104.8 kg), SpO2 95 %. intubated and sedated  Neurologic: sedated  Lungs: Good respiratory effort. + crepitus noted. Chest tube without air leak.    CV: Regular rate/ rhythm , 2+ peripheral edema, feet warm and well perfused  GI: Soft, non-tender in all four quadrants, non-distended, + bowel sounds, liver and spleen no palpable masses  : bladder nondistended   MSK: no obvious deformity   Skin: warm, pink and dry       DATA:  CXR  Impression   1. Stable lines, tubes, and support devices. 2. Right pneumothorax is not seen on the current exam.   3. Diffuse airspace opacities, stable. 4. Severe subcutaneous emphysema. IMPRESSION  Patient Active Problem List   Diagnosis    Pneumonia due to COVID-19 virus    Acute respiratory failure with hypoxia ([address]    Hyperglycemia    Alcohol withdrawal (HCC)    Acute ischemic left MCA stroke [facility]    Right flaccid hemiplegia (HCC)    Leukocytosis    Pneumomediastinum (HCC)    Subcutaneous emphysema (HCC)    Biapical Pneumothorax    Hypernatremia    Hyperkalemia    Atrial fibrillation with RVR (HCC)    Transaminitis     Pneumomediastinum   Left PTX      RECOMMENDATIONS:  L sided chest tube was placed by IR a few days ago. Continue to suction. Pneumomediastinum likely related to barotrauma. Follow serial CXR to watch for PTX. Pt seen with Dr. Kimberlyn Conley.      Darryl Vera PA-C

## 2021-10-25 NOTE — PROGRESS NOTES
RENAL DOSE ADJUSTMENT MADE PER P/T PROTOCOL    PREVIOUS ORDER:  Zosyn 3.375g IVPB q12h    Estimated Creatinine Clearance: 18 mL/min (A) (based on SCr of 5.4 mg/dL (H)). Recent Labs     10/24/21  0440 10/24/21  0440 10/24/21  1810 10/25/21  0545   *  --  56* 115*   CREATININE 5.7*   < > 2.9* 5.4*      < >  --  106*    < > = values in this interval not displayed.      NEW RENALLY ADJUSTED ORDER:  Zosyn 4.5g IVPB q8h while ordered on CRRT    Seymour Bangura, [address] [facility]  10/25/2021 3:03 PM

## 2021-10-25 NOTE — PROGRESS NOTES
106*      BMP   Recent Labs     10/23/21  0326 10/23/21  0326 10/24/21  0440 10/24/21  1810 10/25/21  0545   *   < > 145 141 144   K 4.9   < > 6.1* 3.5 4.7      < > 104 95* 98*   CO2 26   < > 22 29 25   PHOS 8.1*  --  11.2*  --  8.2*   BUN 44*   < > 101* 56* 115*   CREATININE 2.9*   < > 5.7* 2.9* 5.4*    < > = values in this interval not displayed. Hepatic:   Recent Labs     10/23/21  0326 10/24/21  0440 10/25/21  0545   AST 40* 51* 43*   ALT 85* 67* 53*   BILITOT 4.0* 1.9* 0.8   ALKPHOS 99 61 50     Troponin: No results for input(s): TROPONINI in the last 72 hours. BNP: No results for input(s): BNP in the last 72 hours. Lipids: No results for input(s): CHOL, HDL in the last 72 hours. Invalid input(s): LDLCALCU  ABGs:   Lab Results   Component Value Date    PO2ART 64 10/25/2021    FYV4LPP 69.0 10/25/2021     INR: No results for input(s): INR in the last 72 hours.   Renal Labs  Albumin:    Lab Results   Component Value Date    LABALBU 3.0 10/25/2021    LABALBU 3.0 10/25/2021     Calcium:    Lab Results   Component Value Date    CALCIUM 6.3 10/25/2021     Phosphorus:    Lab Results   Component Value Date    PHOS 8.2 10/25/2021     U/A:    Lab Results   Component Value Date    NITRU NEGATIVE 10/22/2021    COLORU RED 10/22/2021    WBCUA 6 10/22/2021    RBCUA 427 10/22/2021    MUCUS FEW 10/22/2021    TRICHOMONAS NONE SEEN 10/22/2021    BACTERIA FEW 10/22/2021    CLARITYU CLOUDY 10/22/2021    SPECGRAV 1.019 10/22/2021    UROBILINOGEN NEGATIVE 10/22/2021    BILIRUBINUR NEGATIVE 10/22/2021    BLOODU LARGE 10/22/2021    KETUA NEGATIVE 10/22/2021     ABG:    Lab Results   Component Value Date    SNH1JGY 69.0 10/25/2021    PO2ART 64 10/25/2021    YOJ1MAJ 27.6 10/25/2021     HgBA1c:    Lab Results   Component Value Date    LABA1C 7.1 10/20/2021     Microalbumen/Creatinine ratio:  No components found for: RUCREAT  TSH:  No results found for: TSH  IRON:  No results found for: IRON  Iron Saturation:  No components found for: PERCENTFE  TIBC:  No results found for: TIBC  FERRITIN:    Lab Results   Component Value Date    FERRITIN 1,746 10/14/2021     RPR:  No results found for: RPR  GISEL:  No results found for: ANATITER, GISEL  24 Hour Urine for Creatinine Clearance:  No components found for: CREAT4, UHRS10, UTV10      Objective:   I/O: 10/24 0701 - 10/25 0700  In: 4034 [I.V.:3099.9]  Out: 93 [Urine:30]  I/O last 3 completed shifts: In: 4034 [I.V.:3099.9; IV Piggyback:334.1]  Out: 93 [Urine:30; Chest Tube:35]  I/O this shift:  In: -   Out: 257 [Urine:20; Emesis/NG output:90]  Vitals: BP 96/74   Pulse 92   Temp 99.2 °F (37.3 °C) (Rectal)   Resp 22   Ht 6' 3\" (1.905 m)   Wt 231 lb (104.8 kg)   SpO2 95%   BMI 28.87 kg/m²  {  General appearance: Sedated ET tube  HEENT: Head: Normal, normocephalic, atraumatic. Neck: supple, symmetrical, trachea midline  Lungs: diminished breath sounds bilaterally  Heart: S1, S2 normal  Abdomen: abnormal findings:  soft nt  Extremities: edema trace  Neurologic: Mental status: alertness: Sedated        Assessment and Plan:      IMP:  1. Acute renal failure from ATN  2. Metabolic acidosis with hyperkalemia  3. Hypernatremia  #4 COVID-19 positive  5. Respiratory distress with pneumothorax  6 leukocytosis  7. Acute ischemic left MCA stroke  8. History of alcohol use with possible withdrawal  9. Type 2 diabetes    Plan     #1 renal function not improving will start patient on CRRT dialysis patient is a limited code and await discussion with family see above plan of care if not respond  2. Try to correct acidosis and potassium with dialysis  3. Sodium stable  4. Maintain Covid protocols  5. Follow-up vascular recommendations with pneumothorax  6. Follow cultures maintain antibiotics next #7 monitor neuro status  8. Not appear in DTs now we will sedated  9.   Try to improve glucose control           Charline Stuart MD, MD

## 2021-10-25 NOTE — PROGRESS NOTES
pulmonary      SUBJECTIVE:  Intubated on vent     OBJECTIVE    VITALS:  BP 81/63   Pulse 106   Temp 100.2 °F (37.9 °C) (Rectal)   Resp 22   Ht 6' 3\" (1.905 m)   Wt 231 lb (104.8 kg)   SpO2 95%   BMI 28.87 kg/m²   HEAD AND FACE EXAM:  No throat injection, no active exudate,no thrush  NECK EXAM;No JVD, no masses, symmetrical  CHEST EXAM; Expansion equal and symmetrical, no masses  LUNG EXAM; Good breath sounds bilaterally.  There are expiratory wheezes both lungs, there are crackles at both lung bases  CARDIOVASCULAR EXAM: Positive S1 and S2, no S3 or S4, no clicks ,no murmurs  RIGHT AND LEFT LOWER EXTRIMITY EXAM: No edema, no swelling, no inflamation            LABS   Lab Results   Component Value Date    WBC 20.9 (H) 10/25/2021    HGB 10.6 (L) 10/25/2021    HCT 32.0 (L) 10/25/2021    MCV 96.1 10/25/2021     (L) 10/25/2021     Lab Results   Component Value Date    CREATININE 5.4 (H) 10/25/2021     (H) 10/25/2021     10/25/2021    K 4.7 10/25/2021    CL 98 (L) 10/25/2021    CO2 25 10/25/2021     No results found for: INR, PROTIME       Lab Results   Component Value Date    PHOS 8.2 10/25/2021    PHOS 11.2 10/24/2021    PHOS 8.1 10/23/2021        Recent Labs     10/24/21  0600 10/24/21  1945 10/25/21  0600   PH 7.08* 7.31* 7.21*   PO2ART 83 53* 64*   IBB5UUK 80.0* 54.0* 69.0*   O2SAT 94.6* 88.2* 90.5*         Wt Readings from Last 3 Encounters:   10/17/21 231 lb (104.8 kg)   10/10/21 260 lb (117.9 kg)   05/13/21 252 lb 12.8 oz (114.7 kg)        EXAMINATION:   ONE XRAY VIEW OF THE CHEST       10/25/2021 2:43 am       COMPARISON:   October 24, 2021       HISTORY:   ORDERING SYSTEM PROVIDED HISTORY: fu pneumonia,ptx   TECHNOLOGIST PROVIDED HISTORY:   Reason for exam:->fu pneumonia,ptx   Reason for Exam: fu pneumonia,ptx   Acuity: Unknown   Type of Exam: Subsequent/Follow-up   Additional signs and symptoms: fu pneumonia,ptx   Relevant Medical/Surgical History: fu pneumonia,ptx       FINDINGS: Stable lines and tubes including endotracheal tube, nasogastric tube, right   upper extremity PICC, and left pleural pigtail catheter.  Stable   cardiomediastinal silhouette.  Pneumomediastinum is seen.  The pulmonary   system demonstrates bilateral airspace opacities.  No discrete pneumothorax   identified on the current exam.       No new osseous abnormality.  Severe subcutaneous emphysema is unchanged.           Impression   1. Stable lines, tubes, and support devices. 2. Right pneumothorax is not seen on the current exam.   3. Diffuse airspace opacities, stable. 4. Severe subcutaneous emphysema.                 ASSESMENT  Ac resp failure  covid pneumonia  Bact pneumonia  Left ptx        PLAN  1. cpm  2. Cont full vent support  3.  Left chest tube in place and still with some airleak    10/25/2021  Danielle Mcdonald MD, M.D.

## 2021-10-25 NOTE — PROGRESS NOTES
10/25/21 0425   Vent Information   Vent Type 980   Vent Mode AC/PC   Vt Ordered 0 mL   Rate Set 22 bmp   Peak Flow 0 L/min   Pressure Support 0 cmH20   FiO2  100 %   SpO2 (!) 88 %   SpO2/FiO2 ratio 88   Sensitivity 3   PEEP/CPAP 15   I Time/ I Time % 1 s   Vent Patient Data   High Peep/I Pressure 20   Peak Inspiratory Pressure 36 cmH2O   Mean Airway Pressure 25 cmH20   Rate Measured 24 br/min   Vt Exhaled 698 mL   Minute Volume 15.8 Liters   I:E Ratio 1:1.20   Spontaneous Breathing Trial (SBT) RT Doc   Pulse 97   Additional Respiratory  Assessments   Resp 24   Alarm Settings   High Pressure Alarm 40 cmH2O   ETT (adult)   Placement Date/Time: 10/22/21 0220   Timeout: Patient;Procedure; Appropriate Equipment;Correct Position  Preoxygenation: Yes  Mask Ventilation: Ventilated by mask (1)  Technique: Direct laryngoscopy;Rapid sequence;Stylet  Type: Cuffed  Tube Size: 7.5 m. ..    Secured at 23 cm   Measured From [address] [place] [address],[address] By Commercial tube strong   Site Condition Dry   Cuff Pressure   (mlt)

## 2021-10-25 NOTE — PLAN OF CARE
Problem: Falls - Risk of:  Goal: Will remain free from falls  Description: Will remain free from falls  Outcome: Ongoing  Goal: Absence of physical injury  Description: Absence of physical injury  Outcome: Ongoing     Problem: Airway Clearance - Ineffective  Goal: Achieve or maintain patent airway  Outcome: Ongoing     Problem: Gas Exchange - Impaired  Goal: Absence of hypoxia  Outcome: Ongoing  Goal: Promote optimal lung function  Outcome: Ongoing     Problem: Breathing Pattern - Ineffective  Goal: Ability to achieve and maintain a regular respiratory rate  Outcome: Ongoing     Problem:  Body Temperature -  Risk of, Imbalanced  Goal: Ability to maintain a body temperature within defined limits  Outcome: Ongoing  Goal: Will regain or maintain usual level of consciousness  Outcome: Ongoing  Goal: Complications related to the disease process, condition or treatment will be avoided or minimized  Outcome: Ongoing     Problem: Isolation Precautions - Risk of Spread of Infection  Goal: Prevent transmission of infection  Outcome: Ongoing     Problem: Nutrition Deficits  Goal: Optimize nutritional status  Outcome: Ongoing     Problem: Risk for Fluid Volume Deficit  Goal: Maintain normal heart rhythm  Outcome: Ongoing  Goal: Maintain absence of muscle cramping  Outcome: Ongoing  Goal: Maintain normal serum potassium, sodium, calcium, phosphorus, and pH  Outcome: Ongoing     Problem: Loneliness or Risk for Loneliness  Goal: Demonstrate positive use of time alone when socialization is not possible  Outcome: Ongoing     Problem: Fatigue  Goal: Verbalize increase energy and improved vitality  Outcome: Ongoing     Problem: Patient Education: Go to Patient Education Activity  Goal: Patient/Family Education  Outcome: Ongoing     Problem: Nutrition  Goal: Optimal nutrition therapy  Outcome: Ongoing     Problem: Skin Integrity:  Goal: Will show no infection signs and symptoms  Description: Will show no infection signs and

## 2021-10-26 NOTE — PROGRESS NOTES
10/26/21 0721   Vent Information   $Ventilation $Subsequent Day   Vent Type 980   Vent Mode AC/PC   Vt Ordered 0 mL   Pressure Ordered 20   Rate Set 22 bmp   Peak Flow 0 L/min   Pressure Support 0 cmH20   FiO2  90 %   SpO2 98 %   SpO2/FiO2 ratio 108.89   Sensitivity 3   PEEP/CPAP 15   I Time/ I Time % 1 s   Humidification Source HME   Vent Patient Data   High Peep/I Pressure 20   Peak Inspiratory Pressure 35 cmH2O   Mean Airway Pressure 24 cmH20   Rate Measured 22 br/min   Vt Exhaled 738 mL   Minute Volume 15.9 Liters   I:E Ratio 1:1.30   Plateau Pressure 35 [id number]   Static Compliance 37 mL/cmH2O   Total PEEP 17 cmH20   Auto PEEP 0.7 cmH20   Cough/Sputum   Sputum How Obtained Endotracheal;Suctioned   $Obtained Sample $Induced Sputum   Cough Productive   Frequency Occasional   Sputum Amount Small   Sputum Color Red   Tenacity Thick   Spontaneous Breathing Trial (SBT) RT Doc   Pulse 95   Breath Sounds   Right Upper Lobe Diminished   Right Middle Lobe Diminished   Right Lower Lobe Diminished   Left Upper Lobe Diminished   Left Lower Lobe Diminished   Additional Respiratory  Assessments   Resp 23   Position Semi-Meyers's   Subglottic Suction Done? Yes   Alarm Settings   High Pressure Alarm 40 cmH2O   Delay Alarm 20 sec(s)   Low Minute Volume Alarm 2.5 L/min   Apnea (secs) 20 secs   High Respiratory Rate 40 br/min   Low Exhaled Vt  250 mL   ETT (adult)   Placement Date/Time: 10/22/21 0220   Timeout: Patient;Procedure; Appropriate Equipment;Correct Position  Preoxygenation: Yes  Mask Ventilation: Ventilated by mask (1)  Technique: Direct laryngoscopy;Rapid sequence;Stylet  Type: Cuffed  Tube Size: 7.5 m. ..    Secured at 25 cm   Measured From Lips   ET Placement Left   Secured By Commercial tube strong   Site Condition Dry

## 2021-10-26 NOTE — PROGRESS NOTES
pulmonary      SUBJECTIVE: intubated on vent,severe sc air     OBJECTIVE    VITALS:  BP 96/81   Pulse 107   Temp 98 °F (36.7 °C) (Rectal)   Resp 23   Ht 6' 3\" (1.905 m)   Wt 231 lb (104.8 kg)   SpO2 93%   BMI 28.87 kg/m²   HEAD AND FACE EXAM:  No throat injection, no active exudate,no thrush  NECK EXAM;No JVD, no masses, symmetrical  CHEST EXAM; Expansion equal and symmetrical, no masses  LUNG EXAM; Good breath sounds bilaterally.  There are expiratory wheezes both lungs, there are crackles at both lung bases  CARDIOVASCULAR EXAM: Positive S1 and S2, no S3 or S4, no clicks ,no murmurs  RIGHT AND LEFT LOWER EXTRIMITY EXAM: No edema, no swelling, no inflamation            LABS   Lab Results   Component Value Date    WBC 32.0 (HH) 10/26/2021    HGB 10.8 (L) 10/26/2021    HCT 33.3 (L) 10/26/2021    MCV 95.7 10/26/2021     10/26/2021     Lab Results   Component Value Date    CREATININE 3.5 (H) 10/26/2021    BUN 65 (H) 10/26/2021     10/26/2021    K 5.2 (H) 10/26/2021     10/26/2021    CO2 24 10/26/2021     No results found for: INR, PROTIME       Lab Results   Component Value Date    PHOS 4.9 10/26/2021    PHOS 5.2 10/25/2021    PHOS 6.5 10/25/2021        Recent Labs     10/24/21  1945 10/25/21  0600 10/26/21  0600   PH 7.31* 7.21* 7.35   PO2ART 53* 64* 169*   GOI2WOL 54.0* 69.0* 42.0   O2SAT 88.2* 90.5* 97.1*         Wt Readings from Last 3 Encounters:   10/17/21 231 lb (104.8 kg)   10/10/21 260 lb (117.9 kg)   05/13/21 252 lb 12.8 oz (114.7 kg)        EXAMINATION:   ONE XRAY VIEW OF THE CHEST       10/26/2021 2:04 am       COMPARISON:   10/25/2021.       HISTORY:   ORDERING SYSTEM PROVIDED HISTORY: fu pneumonia,ptx   TECHNOLOGIST PROVIDED HISTORY:   Reason for exam:->fu pneumonia,ptx   Reason for Exam: fu pneumonia,ptx   Acuity: Unknown   Type of Exam: Unknown       FINDINGS:   There is extensive soft tissue emphysema seen involving the thorax and lower   cervical regions bilaterally.  The left pleural catheter and right PICC line   appear unchanged in position.  Enteric tube is seen coursing below the   diaphragm with the endotracheal tube unchanged in position.  The cardiac   silhouette appears stable in size.  There is suggestion of a new mole   mediastinum, which is similar to the prior exam.  Patchy opacification within   the lungs bilaterally.  There may be trace bilateral pneumothoraces.           Impression   1. Extensive soft tissue emphysema partially limits evaluation. 2. Support devices appear unchanged in position. 3. Questionable trace bilateral pneumothoraces. 4. Patchy opacification within the lungs bilaterally.           Order History    Open Order Details   PACS Images     Show images for XR CHEST PORTABLE               ASSESMENT  Ac resp failure  covid pneumonia  Bact pneumonia  Left ptx        PLAN  1. cpm  2. Cont full vent support  3.  Prognosis is guarded    10/26/2021  Jaja Aguilar MD, M.SARAHY.

## 2021-10-26 NOTE — FLOWSHEET NOTE
I updated the patient's wife this morning on current & ongoing issues and all interventions happening currently. She is requesting a meeting or phone call with attending physician for overall prognosis and outlook in order to make a decision on further interventions. Hospitalist notified via [employer]. She also requested all information go through her at this time.

## 2021-10-26 NOTE — PROGRESS NOTES
Comprehensive Nutrition Assessment    Type and Reason for Visit:  Reassess    Nutrition Recommendations/Plan:   Consult dietitian for tube feed order and manage  Recommended EN Regimen:   Vital HP @ 85 ml/hr which will provide 2288 kcal (including kcal from propofol) and 178 g protein  Advance slowly to goal rate as tolerated  Will closely monitor GI tolerance, nutrition related labs, nutrition status, poc    Nutrition Assessment:  pt remains sedated on vent, +CRRT, +Levophed, +ogt, pt has been NPO x 5 days, perfectserved attending physician regarding nutrition plan, will continue  to follow pt at high nutrition risk    Malnutrition Assessment:  Malnutrition Status:  Insufficient data    Context:  Acute Illness       Estimated Daily Nutrient Needs:  Energy (kcal):  1675 ([place]); Weight Used for Energy Requirements:  Current     Protein (g):  178-223 (2.0-2.5 g/kg); Weight Used for Protein Requirements:  Ideal        Fluid (ml/day):  2000; Method Used for Fluid Requirements:  1 ml/kcal      Nutrition Related Findings:  K+ 5.2, Glucose 179      Wounds:  None       Current Nutrition Therapies:    Diet NPO  Additional Calorie Sources:   pt is receiving ~248 kcal from current propofol rate    Anthropometric Measures:  · Height: 6' 3\" (190.5 cm)  · Current Body Weight: 231 lb 0.7 oz (104.8 kg)   · Admission Body Weight: 259 lb (117.5 kg)    · Usual Body Weight: 252 lb (114.3 kg)     · Ideal Body Weight: 196 lbs; % Ideal Body Weight 117.9 %   · BMI: 28.9  · BMI Categories: Obese Class 1 (BMI 30.0-34. 9)       Nutrition Diagnosis:   · Inadequate oral intake related to acute injury/trauma as evidenced by NPO or clear liquid status due to medical condition    Nutrition Interventions:   Food and/or Nutrient Delivery:  Start Tube Feeding  Nutrition Education/Counseling:  No recommendation at this time   Coordination of Nutrition Care:  Continue to monitor while inpatient    Goals:  pt will have nutrition source in the next 24 hr       Nutrition Monitoring and Evaluation:   Behavioral-Environmental Outcomes:  None Identified   Food/Nutrient Intake Outcomes:  Enteral Nutrition Intake/Tolerance  Physical Signs/Symptoms Outcomes:  Biochemical Data, Weight, Skin, GI Status, Fluid Status or Edema, Hemodynamic Status     Discharge Planning:     Too soon to determine     Electronically signed by Jaden Alejandro MS, RD, LD on 10/26/21 at 9:59 AM EDT    Contact: [phone]

## 2021-10-26 NOTE — PROGRESS NOTES
Hospitalist Progress Note      Name:  Natasha Jones /Age/Sex: 1955  (77 y.o. male)   MRN & CSN:  [id number] & [id number] Admission Date/Time: 10/14/2021  5:46 AM   Location:  -A PCP: No primary care provider on file. Hospital Day: 13    Assessment and Plan:   Pneumonia due to COVID-19 virus   Acute respiratory failure with hypoxia  - He was difficult to oxygenate post intubation requiring proning position for overnight on 10/22/2021. Remains supine. Remains on the vent with high settings  -Continue dexamethasone  -Continue baricitinib renally dosed  -Continue empiric antibiotics (Zosyn)  -Continue breathing therapy with cardiopulmonary protocol  -Pulmonology following with recommendations  - Discussed with wife today; poor prognosis and per Wife patient did not want to be on ventilator long; family to make decisions whether regarding to continue care daily     Acute kidney injury with metabolic acidosis  Hyperkalemia  -Nephrology following; dialysis catheter placed  -Daily BMP     Acute ischemic left MCA stroke  - Right flaccid hemiplegia: CT brain noted left MCA stroke. Carotid Dopplers with no stenosis. -Continue aspirin and statin  - Echo completed but unable to be read due to pneumomediastium  -Neurology following remotely      Atrial fibrillation with RVR: Was started on Cardizem drip. Rate improved to the low 100s.   -Rate controlled  -Cardiology previously following     Biapical Pneumothorax with Pneumomediastinum and Subcutaneous emphysema:  - s/p chest tube placement  -We will continue to monitor  -Pulmonology following as well as CTS; appreciate recs    History of Alcohol Abuse  - Currently sedated     Hyperglycemia  - Continue Sliding scale insulin and Lantus    Transaminitis  - Monitor daily  - In setting of COVID         Diet Diet NPO  ADULT TUBE FEEDING; Orogastric; Peptide Based; Continuous; 10; Yes; 10; Q 6 hours; 65; 30; Q 4 hours; Protein; Proteinex BID   DVT Prophylaxis [] Lovenox, []  Heparin, [] SCDs, [] Ambulation   GI Prophylaxis [] PPI,  [] H2 Blocker,  [] Carafate,  [] Diet/Tube Feeds   Code Status Limited   Disposition Patient requires continued admission due to    MDM [] Low, [] Moderate,[]  High  Patient's risk as above due to      History of Present Illness:     Remains intubated and sedated; 80% with PEEP of 15; no fevers overnight    Objective: Intake/Output Summary (Last 24 hours) at 10/26/2021 1621  Last data filed at 10/26/2021 1505  Gross per 24 hour   Intake 3268.66 ml   Output 3752 ml   Net -483.34 ml      Vitals:   Vitals:    10/26/21 1538   BP:    Pulse: 101   Resp: 22   Temp:    SpO2: 94%     Physical Exam:   GEN Intubated and sedated  NECK Supple, no apparent thyromegaly or masses. RESP Mechanical breath sounds bilaterally  CARDIO/VASC S1/S2 auscultated. Regular rate without appreciable murmurs, rubs, or gallops. No JVD or carotid bruits  GI Abdomen is soft   Hayes in place  MSK No gross joint deformities. SKIN Normal coloration, warm, dry.   NEURO Intubated and sedated    Medications:   Medications:    vancomycin  1,500 mg IntraVENous Once    piperacillin-tazobactam  4,500 mg IntraVENous Q8H    pantoprazole  40 mg IntraVENous BID    sodium bicarbonate  100 mEq IntraVENous Once    lidocaine PF  5 mL IntraDERmal Once    sodium chloride flush  5-40 mL IntraVENous 2 times per day    ipratropium  4 puff Inhalation Q4H    And    albuterol sulfate HFA  4 puff Inhalation Q4H    insulin lispro  0-12 Units SubCUTAneous Q4H    [Held by provider] baricitinib  1 mg Oral Daily    vancomycin (VANCOCIN) intermittent dosing (placeholder)   Other See Admin Instructions    atorvastatin  80 mg Oral Nightly    aspirin  300 mg Rectal Daily    sodium chloride flush  5-40 mL IntraVENous 2 times per day    thiamine  100 mg Oral Daily    multivitamin  1 tablet Oral Daily    insulin glargine  10 Units SubCUTAneous Nightly    zinc sulfate  50 mg Oral Daily    ascorbic acid  1,000 mg Oral Daily    sodium chloride flush  5-40 mL IntraVENous 2 times per day    enoxaparin  30 mg SubCUTAneous BID      Infusions:    prismaSATE BGK 2/0      prismaSATE BGK 2/0      prismaSATE BGK 2/0      norepinephrine 25 mcg/min (10/26/21 1511)    propofol 15 mcg/kg/min (10/26/21 1454)    fentaNYL 50 mcg/hr (10/26/21 0915)    sodium chloride 100 mL/hr at 10/26/21 0616    IV infusion builder 100 mL/hr at 10/26/21 1529    sodium chloride      dexmedetomidine (PRECEDEX) IV infusion Stopped (10/18/21 1940)    dextrose      sodium chloride 100 mL/hr at 10/21/21 0553     PRN Meds: potassium chloride, 20 mEq, PRN  magnesium sulfate, 1,000 mg, PRN  calcium gluconate, 1,000 mg, PRN   Or  calcium gluconate, 2,000 mg, PRN   Or  calcium gluconate, 3,000 mg, PRN   Or  calcium gluconate, 4,000 mg, PRN  sodium phosphate IVPB, 6 mmol, PRN   Or  sodium phosphate IVPB, 12 mmol, PRN   Or  sodium phosphate IVPB, 18 mmol, PRN   Or  sodium phosphate IVPB, 24 mmol, PRN  sodium chloride flush, 5-40 mL, PRN  sodium chloride, 25 mL, PRN  ondansetron, 4 mg, Q8H PRN   Or  ondansetron, 4 mg, Q6H PRN  polyethylene glycol, 17 g, Daily PRN  sodium chloride flush, 5-40 mL, PRN  sodium chloride, 25 mL, PRN  lubrifresh P.M., , Q2H PRN  glucose, 15 g, PRN  dextrose, 12.5 g, PRN  glucagon (rDNA), 1 mg, PRN  dextrose, 100 mL/hr, PRN  sodium chloride flush, 5-40 mL, PRN  sodium chloride, 25 mL, PRN  acetaminophen, 650 mg, Q6H PRN   Or  acetaminophen, 650 mg, Q6H PRN  guaiFENesin-dextromethorphan, 5 mL, Q4H PRN  potassium chloride, 10 mEq, PRN        Electronically signed by Mata Irving MD on 10/26/2021 at 4:21 PM

## 2021-10-26 NOTE — PROGRESS NOTES
[address] [facility]  consulted by Stephen Cheng CNP for monitoring and adjustment. Indication for treatment: Sepsis  Goal trough: 15-20 mcg/mL    Ht Readings from Last 1 Encounters:   10/26/21 6' 3\" (1.905 m)     Wt Readings from Last 3 Encounters:   10/17/21 231 lb (104.8 kg)   10/10/21 260 lb (117.9 kg)   05/13/21 252 lb 12.8 oz (114.7 kg)        Pertinent Laboratory Values:   Temp Readings from Last 3 Encounters:   10/26/21 98.3 °F (36.8 °C) (Rectal)   10/10/21 99.3 °F (37.4 °C) (Oral)   05/13/21 96.9 °F (36.1 °C) (Infrared)     Recent Labs     10/24/21  0440 10/25/21  0545 10/26/21  0453   WBC 18.6* 20.9* 32.0*     Recent Labs     10/25/21  2250 10/26/21  0453 10/26/21  1100   BUN 78* 65* 72*   CREATININE 3.3* 3.5* 4.1*     Estimated Creatinine Clearance: 23 mL/min (A) (based on SCr of 4.1 mg/dL (H)). Intake/Output Summary (Last 24 hours) at 10/26/2021 1325  Last data filed at 10/26/2021 1308  Gross per 24 hour   Intake 3268.66 ml   Output 3884 ml   Net -615.34 ml       VANCOMYCIN TROUGH:  No results for input(s): VANCOTROUGH in the last 72 hours.   VANCOMYCIN RANDOM:    Recent Labs     10/24/21  0440 10/26/21  0453   VANCORANDOM 18.3 13.5       Pertinent Cultures:  Date    Source    Results  10/22   Blood    NGTD  10/24   Respiratory   Pending  10/25   MRSA nasal   Pending    Assessment:  · WBC @ 32; afebrile  · SCr, BUN, and urine output:  · JOANIE  · Previously HD, now transitioned to CRRT  · Day(s) of therapy: 4  · Vancomycin concentration:  · 10/23 - 22.1, post HD level  · 10/24 - 18.3, pre-HD level, ok to re-dose  · 10/26 - 13.5, appropriate to re-dose    Plan:  · Intermittent vancomycin dosing based on levels  · Transition from HD -> CRRT beginning yesterday  · Based on level, re-dose with 15 mg/kg (1500 mg)  · Repeat next level tomorrow AM  · Pharmacy will continue to monitor patient and adjust therapy as indicated    Kunal 3 10/26 @ 0600    Thank you for the consult,  Brenda Rice [facility] [place], PharmD  10/26/21  1:25 PM

## 2021-10-26 NOTE — PROGRESS NOTES
10/26/21 1234   Vent Information   Vent Type 980   Vent Mode AC/PC   Vt Ordered 0 mL   Pressure Ordered 20   Rate Set 22 bmp   Peak Flow 0 L/min   Pressure Support 0 cmH20   FiO2  80 %   SpO2 92 %   SpO2/FiO2 ratio 115   Sensitivity 3   PEEP/CPAP 15   I Time/ I Time % 1 s   Vent Patient Data   High Peep/I Pressure 20   Peak Inspiratory Pressure 36 cmH2O   Mean Airway Pressure 24 cmH20   Rate Measured 22 br/min   Vt Exhaled 694 mL   Minute Volume 15.5 Liters   I:E Ratio 1:1.20   Spontaneous Breathing Trial (SBT) RT Doc   Pulse 108   Breath Sounds   Right Upper Lobe Diminished   Right Middle Lobe Diminished   Right Lower Lobe Diminished   Left Upper Lobe Diminished   Left Lower Lobe Diminished   Additional Respiratory  Assessments   Resp 21   Position Semi-Meyers's   Alarm Settings   High Pressure Alarm 40 cmH2O   Delay Alarm 20 sec(s)   Low Minute Volume Alarm 2.5 L/min   Apnea (secs) 20 secs   High Respiratory Rate 40 br/min   Low Exhaled Vt  250 mL   ETT (adult)   Placement Date/Time: 10/22/21 0220   Timeout: Patient;Procedure; Appropriate Equipment;Correct Position  Preoxygenation: Yes  Mask Ventilation: Ventilated by mask (1)  Technique: Direct laryngoscopy;Rapid sequence;Stylet  Type: Cuffed  Tube Size: 7.5 m. ..    Secured at 23 cm   Measured From Lips   ET Placement Left   Secured By Commercial tube strong   Site Condition Dry

## 2021-10-26 NOTE — PROGRESS NOTES
Nephrology Progress Note  10/26/2021 3:36 PM  Subjective: Interval History: Stu Recio is a 77 y.o. male seen on CRRT and overall declining    Data:   Scheduled Meds:   vancomycin  1,500 mg IntraVENous Once    piperacillin-tazobactam  4,500 mg IntraVENous Q8H    pantoprazole  40 mg IntraVENous BID    sodium bicarbonate  100 mEq IntraVENous Once    lidocaine PF  5 mL IntraDERmal Once    sodium chloride flush  5-40 mL IntraVENous 2 times per day    ipratropium  4 puff Inhalation Q4H    And    albuterol sulfate HFA  4 puff Inhalation Q4H    insulin lispro  0-12 Units SubCUTAneous Q4H    [Held by provider] baricitinib  1 mg Oral Daily    vancomycin (VANCOCIN) intermittent dosing (placeholder)   Other See Admin Instructions    atorvastatin  80 mg Oral Nightly    aspirin  300 mg Rectal Daily    sodium chloride flush  5-40 mL IntraVENous 2 times per day    thiamine  100 mg Oral Daily    multivitamin  1 tablet Oral Daily    insulin glargine  10 Units SubCUTAneous Nightly    zinc sulfate  50 mg Oral Daily    ascorbic acid  1,000 mg Oral Daily    sodium chloride flush  5-40 mL IntraVENous 2 times per day    enoxaparin  30 mg SubCUTAneous BID     Continuous Infusions:   prismaSATE BGK 2/0      prismaSATE BGK 2/0      prismaSATE BGK 2/0      norepinephrine 25 mcg/min (10/26/21 1511)    propofol 15 mcg/kg/min (10/26/21 1454)    fentaNYL 50 mcg/hr (10/26/21 0915)    sodium chloride 100 mL/hr at 10/26/21 0616    IV infusion builder 100 mL/hr at 10/26/21 1529    sodium chloride      dexmedetomidine (PRECEDEX) IV infusion Stopped (10/18/21 1940)    dextrose      sodium chloride 100 mL/hr at 10/21/21 0553         CBC   Recent Labs     10/24/21  0440 10/24/21  0440 10/25/21  0545 10/25/21  2125 10/26/21  0453   WBC 18.6*  --  20.9*  --  32.0*   HGB 13.9   < > 10.6* 11.4* 10.8*   HCT 44.7   < > 32.0* 34.6* 33.3*     --  106*  --  147    < > = values in this interval not displayed. BMP   Recent Labs     10/25/21  2250 10/26/21  0453 10/26/21  1100    139 139   K 4.8 5.2* 5.6*   CL 99 100 99   CO2 25 24 23   PHOS 5.2* 4.9 6.4*   BUN 78* 65* 72*   CREATININE 3.3* 3.5* 4.1*     Hepatic:   Recent Labs     10/25/21  2250 10/26/21  0453 10/26/21  1100   AST 55* 56* 72*   ALT 62* 61* 76*   BILITOT 1.0 0.9 1.1*   ALKPHOS 64 67 76     Troponin: No results for input(s): TROPONINI in the last 72 hours. BNP: No results for input(s): BNP in the last 72 hours. Lipids: No results for input(s): CHOL, HDL in the last 72 hours. Invalid input(s): LDLCALCU  ABGs:   Lab Results   Component Value Date    PO2ART 169 10/26/2021    DTT2HIO 42.0 10/26/2021     INR: No results for input(s): INR in the last 72 hours.   Renal Labs  Albumin:    Lab Results   Component Value Date    LABALBU 3.1 10/26/2021     Calcium:    Lab Results   Component Value Date    CALCIUM 7.7 10/26/2021     Phosphorus:    Lab Results   Component Value Date    PHOS 6.4 10/26/2021     U/A:    Lab Results   Component Value Date    NITRU NEGATIVE 10/22/2021    COLORU RED 10/22/2021    WBCUA 6 10/22/2021    RBCUA 427 10/22/2021    MUCUS FEW 10/22/2021    TRICHOMONAS NONE SEEN 10/22/2021    BACTERIA FEW 10/22/2021    CLARITYU CLOUDY 10/22/2021    SPECGRAV 1.019 10/22/2021    UROBILINOGEN NEGATIVE 10/22/2021    BILIRUBINUR NEGATIVE 10/22/2021    BLOODU LARGE 10/22/2021    KETUA NEGATIVE 10/22/2021     ABG:    Lab Results   Component Value Date    LYI4CMG 42.0 10/26/2021    PO2ART 169 10/26/2021    RIL6IYJ 23.2 10/26/2021     HgBA1c:    Lab Results   Component Value Date    LABA1C 7.1 10/20/2021     Microalbumen/Creatinine ratio:  No components found for: RUCREAT  TSH:  No results found for: TSH  IRON:  No results found for: IRON  Iron Saturation:  No components found for: PERCENTFE  TIBC:  No results found for: TIBC  FERRITIN:    Lab Results   Component Value Date    FERRITIN 1,746 10/14/2021     RPR:  No results found for: RPR  GISEL:  No

## 2021-10-26 NOTE — PROGRESS NOTES
10/26/21 0409   Vent Information   Vent Type 980   Vent Mode AC/PC   Vt Ordered 0 mL   Rate Set 22 bmp   Peak Flow 0 L/min   Pressure Support 0 cmH20   FiO2  100 %   SpO2 98 %   SpO2/FiO2 ratio 98   Sensitivity 3   PEEP/CPAP 15   I Time/ I Time % 1 s   Vent Patient Data   High Peep/I Pressure 20   Peak Inspiratory Pressure 36 cmH2O   Mean Airway Pressure 24 cmH20   Rate Measured 25 br/min   Vt Exhaled 576 mL   Minute Volume 14.8 Liters   I:E Ratio 1:1.30   Spontaneous Breathing Trial (SBT) RT Doc   Pulse 96   Additional Respiratory  Assessments   Resp 23   Alarm Settings   High Pressure Alarm 40 cmH2O   ETT (adult)   Placement Date/Time: 10/22/21 0220   Timeout: Patient;Procedure; Appropriate Equipment;Correct Position  Preoxygenation: Yes  Mask Ventilation: Ventilated by mask (1)  Technique: Direct laryngoscopy;Rapid sequence;Stylet  Type: Cuffed  Tube Size: 7.5 m. ..    Secured at 24 cm   Measured From [address] [place] [address],[address] By Commercial tube strong   Site Condition Dry   Cuff Pressure   (mlt)

## 2021-10-27 NOTE — PROGRESS NOTES
Pt bp had been trending low since 1900. Contacted Dr. Naresh Jin at that time, new orders received for Albumin and Gopi gtt and CRRT fluid removal lowered to zero. Following titration of Gopi and Levo sbp remained 60-70's while Levo titrated up. Dr. Naresh Jin notified and ordered to stop CRRT at 2158, Vaso ordered, and notify family. This nurse contacted wife and daughter for update, both will be in to visit with pt.

## 2021-10-27 NOTE — PROGRESS NOTES
Family at bedside to visit with patient. Pt currently maxed on three pressors and unable to continue CRRT d/t decreased sbp 60-70's. Family wishes to stop pressors as pt would not wish to continue in this condition. Marcelo Mace NP notified of family wishes, code status changed to [facility] at this time.

## 2021-10-27 NOTE — DISCHARGE SUMMARY
Discharge Summary    Name:  Tanya Mtz /Age/Sex: 1955  (77 y.o. male)   MRN & CSN:  [id number] & [id number] Admission Date/Time: 10/14/2021  5:46 AM   Attending:  No att. providers found Discharging Physician: Saint Clack, MD     Hospital Course:   68-year-old male that presented with Covid pneumonia and acute hypoxemic respiratory failure requiring mechanical ventilation. Patient had a prolonged hospital course complicated by severe JOANIE eventually requiring dialysis and the dialysis catheter as well as an acute CVA and atrial fibrillation with rapid ventricular response. Despite all efforts to treat his Covid patient still remained on high ventilator settings. Patient's kidney function also continue to worsen requiring dialysis catheter placement. On his last hospital day I did speak with the family regarding his poor prognosis and that he was likely not to get off the ventilator anytime soon. They did state that the patient had wishes to to not be on a ventilator for prolonged period of time. The day after I left overnight and early the next morning the patient's blood pressure began to drop while family was at bedside. Given patient was added pressors overnight the family did decide to withdraw care and make the patient DNR CC. I was not present at the time of passing however as noted time of death was 1:52 AM on 10/27.         Consults this admission:  IP CONSULT TO HOSPITALIST  IP CONSULT TO PHARMACY  IP CONSULT TO PULMONOLOGY  IP CONSULT TO SOCIAL WORK  IP CONSULT TO NEUROLOGY  IP CONSULT TO CARDIOTHORACIC SURGERY  IP CONSULT TO PHARMACY  IP CONSULT TO INTERVENTIONAL RADIOLOGY  IP CONSULT TO NEPHROLOGY  IP CONSULT TO INTERVENTIONAL RADIOLOGY  IP CONSULT TO [facility] EVAL    Discharge Instruction:   Patient passed    Discharge Medications:   Patient passed    Objective Findings at Discharge:   BP (!) 42/30   Pulse (!) 0   Temp 97.7 °F (36.5 °C) (Rectal)   Resp (!) 0   Ht 6' 3\" (1.905 m)   Wt 231 lb (104.8 kg)   SpO2 92%   BMI 28.87 kg/m²            Patient passed when I was not present in the hospital.  No physical exam able to be performed. BMP/CBC  Recent Labs     10/25/21  0545 10/25/21  1530 10/25/21  2125 10/25/21  2250 10/26/21  0010 10/26/21  0453 10/26/21  1100      < >  --    < > 134* 139 139   K 4.7   < >  --    < > 5.7* 5.2* 5.6*   CL 98*   < >  --    < > 94* 100 99   CO2 25   < >  --    < > 18* 24 23   *   < >  --    < > 64* 65* 72*   CREATININE 5.4*   < >  --    < > 4.1* 3.5* 4.1*   WBC 20.9*  --   --   --   --  32.0*  --    HCT 32.0*  --  34.6*  --   --  33.3*  --    *  --   --   --   --  147  --     < > = values in this interval not displayed.        Family withdrew care; [facility]    Electronically signed by Carla Bhatai MD on 10/27/2021 at 12:17 PM